# Patient Record
Sex: FEMALE | Race: WHITE | NOT HISPANIC OR LATINO
[De-identification: names, ages, dates, MRNs, and addresses within clinical notes are randomized per-mention and may not be internally consistent; named-entity substitution may affect disease eponyms.]

---

## 2017-02-07 ENCOUNTER — RX ONLY (OUTPATIENT)
Age: 32
Setting detail: RX ONLY
End: 2017-02-07

## 2017-02-07 PROBLEM — L70.0 ACNE VULGARIS: Status: ACTIVE | Noted: 2017-02-07

## 2017-12-01 ENCOUNTER — OFFICE VISIT (OUTPATIENT)
Dept: MEDICAL GROUP | Facility: MEDICAL CENTER | Age: 32
End: 2017-12-01
Payer: COMMERCIAL

## 2017-12-01 VITALS
TEMPERATURE: 99.3 F | OXYGEN SATURATION: 98 % | HEART RATE: 66 BPM | DIASTOLIC BLOOD PRESSURE: 54 MMHG | RESPIRATION RATE: 16 BRPM | SYSTOLIC BLOOD PRESSURE: 100 MMHG | HEIGHT: 69 IN | WEIGHT: 143 LBS | BODY MASS INDEX: 21.18 KG/M2

## 2017-12-01 DIAGNOSIS — R30.0 DYSURIA: ICD-10-CM

## 2017-12-01 LAB
APPEARANCE UR: CLEAR
BILIRUB UR STRIP-MCNC: NEGATIVE MG/DL
COLOR UR AUTO: YELLOW
GLUCOSE UR STRIP.AUTO-MCNC: NEGATIVE MG/DL
KETONES UR STRIP.AUTO-MCNC: NEGATIVE MG/DL
LEUKOCYTE ESTERASE UR QL STRIP.AUTO: NEGATIVE
NITRITE UR QL STRIP.AUTO: NEGATIVE
PH UR STRIP.AUTO: 5.5 [PH] (ref 5–8)
PROT UR QL STRIP: NEGATIVE MG/DL
RBC UR QL AUTO: NORMAL
SP GR UR STRIP.AUTO: 1
UROBILINOGEN UR STRIP-MCNC: NEGATIVE MG/DL

## 2017-12-01 PROCEDURE — 81002 URINALYSIS NONAUTO W/O SCOPE: CPT | Performed by: NURSE PRACTITIONER

## 2017-12-01 PROCEDURE — 99214 OFFICE O/P EST MOD 30 MIN: CPT | Performed by: NURSE PRACTITIONER

## 2017-12-01 RX ORDER — NITROFURANTOIN 25; 75 MG/1; MG/1
100 CAPSULE ORAL 2 TIMES DAILY
Qty: 14 CAP | Refills: 0 | Status: SHIPPED | OUTPATIENT
Start: 2017-12-01 | End: 2017-12-08

## 2017-12-01 ASSESSMENT — ENCOUNTER SYMPTOMS
FLANK PAIN: 0
FEVER: 0

## 2017-12-01 ASSESSMENT — PATIENT HEALTH QUESTIONNAIRE - PHQ9: CLINICAL INTERPRETATION OF PHQ2 SCORE: 0

## 2017-12-01 NOTE — PROGRESS NOTES
"Subjective:      Mini Howe is a 32 y.o. female who presents with UTI (since 11/20/17 buring, frequent urination and pressure)            HPI Mini Howe Is a patient of Dr. Reina here same day visit for dysuria.    Patient reports her symptoms started about 1-1/2 weeks ago. She states she has had urinary frequency, urgency and dysuria. She has been treating with over-the-counter products which have not helped. She denies hematuria, flank pain, fever, chills, nausea or vomiting. Nothing makes her feel better. She states her last UTI was about 3 months ago when she was in [place] and she was treated at an urgent care with antibiotics successfully. She has not been to the clinics in about 2 years stating she is normally healthy. She continues to work as a  and is getting  in [place] within the next 2 months.        Social History   Substance Use Topics   • Smoking status: Never Smoker   • Smokeless tobacco: Never Used   • Alcohol use Yes      Comment: occasionally     Current Outpatient Prescriptions   Medication Sig Dispense Refill   • AZO-CRANBERRY PO Take  by mouth.     • nitrofurantoin monohydr macro (MACROBID) 100 MG Cap Take 1 Cap by mouth 2 times a day for 7 days. 14 Cap 0     No current facility-administered medications for this visit.      Family History   Problem Relation Age of Onset   • Cancer Father 64     brain cancer   History reviewed. No pertinent past medical history.    Review of Systems   Constitutional: Negative for fever.   Genitourinary: Positive for dysuria, frequency and urgency. Negative for flank pain and hematuria.   All other systems reviewed and are negative.         Objective:     /54   Pulse 66   Temp 37.4 °C (99.3 °F)   Resp 16   Ht 1.753 m (5' 9\")   Wt 64.9 kg (143 lb)   SpO2 98%   BMI 21.12 kg/m²       Physical Exam   Constitutional: She is oriented to person, place, and time. She appears well-developed and well-nourished. No " distress.   HENT:   Head: Normocephalic and atraumatic.   Right Ear: External ear normal.   Left Ear: External ear normal.   Nose: Nose normal.   Eyes: Right eye exhibits no discharge. Left eye exhibits no discharge.   Neck: Normal range of motion. Neck supple. No thyromegaly present.   Cardiovascular: Normal rate, regular rhythm and normal heart sounds.  Exam reveals no gallop and no friction rub.    No murmur heard.  Pulmonary/Chest: Effort normal and breath sounds normal. She has no wheezes. She has no rales.   Musculoskeletal: She exhibits no edema or tenderness.   Neurological: She is alert and oriented to person, place, and time. She displays normal reflexes.   Skin: Skin is warm and dry. No rash noted. She is not diaphoretic.   Psychiatric: She has a normal mood and affect. Her behavior is normal. Judgment and thought content normal.   Nursing note and vitals reviewed.              Assessment/Plan:     1. Dysuria  Urine dip in the office is fairly normal except for trace blood. However, the patient is symptomatic of her UTI and culture report will not be back until after the weekend so I will start her on Macrobid. I explained that if she does not improve despite treatment and urine is normal, she may wish to see a gynecologist.  Prevention of UTI's teaching was provided including:    Wipe front to back to prevent e.coli contamination. Increase fluid intake, especially water. Do not hold urine , void when feel need. Finally, be sure to void after intercourse.  - POCT Urinalysis  - URINALYSIS,CULTURE IF INDICATED; Future  - nitrofurantoin monohydr macro (MACROBID) 100 MG Cap; Take 1 Cap by mouth 2 times a day for 7 days.  Dispense: 14 Cap; Refill: 0

## 2017-12-13 ENCOUNTER — OFFICE VISIT (OUTPATIENT)
Dept: MEDICAL GROUP | Facility: CLINIC | Age: 32
End: 2017-12-13
Payer: COMMERCIAL

## 2017-12-13 VITALS
RESPIRATION RATE: 14 BRPM | BODY MASS INDEX: 20.59 KG/M2 | DIASTOLIC BLOOD PRESSURE: 70 MMHG | HEART RATE: 71 BPM | OXYGEN SATURATION: 97 % | WEIGHT: 139 LBS | TEMPERATURE: 99.5 F | SYSTOLIC BLOOD PRESSURE: 118 MMHG | HEIGHT: 69 IN

## 2017-12-13 DIAGNOSIS — L02.91 ABSCESS: ICD-10-CM

## 2017-12-13 PROCEDURE — 10060 I&D ABSCESS SIMPLE/SINGLE: CPT | Performed by: FAMILY MEDICINE

## 2017-12-13 RX ORDER — AMOXICILLIN AND CLAVULANATE POTASSIUM 875; 125 MG/1; MG/1
1 TABLET, FILM COATED ORAL 2 TIMES DAILY
Qty: 14 TAB | Refills: 0 | Status: SHIPPED | OUTPATIENT
Start: 2017-12-13 | End: 2017-12-15 | Stop reason: SDUPTHER

## 2017-12-13 NOTE — PROGRESS NOTES
"CC: Abscess on right side of neck    HPI:   Mini is a 32-year-old white female who presents today with the following.    She noticed a bump that has grown into what appears to be an abscess over her right lower neck. It is tender. She tried using warm compresses which she says did not really help. She is getting  in 3 weeks in [place]. She is leaving the country in 2 weeks. She would like to get it drained.      There are no active problems to display for this patient.      Current Outpatient Prescriptions   Medication Sig Dispense Refill   • amoxicillin-clavulanate (AUGMENTIN) 875-125 MG Tab Take 1 Tab by mouth 2 times a day. 14 Tab 0   • AZO-CRANBERRY PO Take  by mouth.       No current facility-administered medications for this visit.          Allergies as of 12/13/2017   • (No Known Allergies)        ROS: As per HPI.    /70   Pulse 71   Temp 37.5 °C (99.5 °F)   Resp 14   Ht 1.753 m (5' 9\")   Wt 63 kg (139 lb)   LMP 11/10/2017   SpO2 97%   Breastfeeding? No   BMI 20.53 kg/m²     Physical Exam:  Gen:         Alert and oriented, No apparent distress.  Neck:        No Lymphadenopathy.  Skin:         Erythematous, movable, tender, fluctuant mass right lower neck adjacent to the right shoulder. It is approximately 2 cm in diameter.    Procedure note for I & D:  Risks explained. Consent is signed.  The lesion is anesthetized with a small amount of 1% lidocaine. The area is sterilized using 3 successive Betadine swabs. Using sterile technique, the mass is inciized with a #11 blade. This results in some bloody drainage but no pus. The wound was dressed with gauze after applying antibiotic topical ointment.  The patient is given instructions to use warm compresses twice daily and keep the wound clean.      Assessment and Plan.   32 y.o. female with the following issues.    1. Skin Abscess    -Instructions as noted above  - amoxicillin-clavulanate (AUGMENTIN) 875-125 MG Tab; Take 1 Tab by mouth " 2 times a day.  Dispense: 14 Tab; Refill: 0      Patient is very anxious about this healing in an expedited manner since she is leaving the country and getting . We will have her follow-up with Dr. Reina in 2 days for wound check.

## 2017-12-15 ENCOUNTER — OFFICE VISIT (OUTPATIENT)
Dept: MEDICAL GROUP | Facility: CLINIC | Age: 32
End: 2017-12-15
Payer: COMMERCIAL

## 2017-12-15 VITALS
DIASTOLIC BLOOD PRESSURE: 62 MMHG | OXYGEN SATURATION: 99 % | TEMPERATURE: 98.1 F | RESPIRATION RATE: 14 BRPM | BODY MASS INDEX: 20.88 KG/M2 | WEIGHT: 141 LBS | HEART RATE: 60 BPM | HEIGHT: 69 IN | SYSTOLIC BLOOD PRESSURE: 100 MMHG

## 2017-12-15 DIAGNOSIS — L02.91 ABSCESS: ICD-10-CM

## 2017-12-15 PROCEDURE — 99213 OFFICE O/P EST LOW 20 MIN: CPT | Performed by: INTERNAL MEDICINE

## 2017-12-15 RX ORDER — AMOXICILLIN AND CLAVULANATE POTASSIUM 875; 125 MG/1; MG/1
1 TABLET, FILM COATED ORAL 2 TIMES DAILY
Qty: 14 TAB | Refills: 0 | Status: SHIPPED | OUTPATIENT
Start: 2017-12-15 | End: 2017-12-15

## 2017-12-15 NOTE — PROGRESS NOTES
"CC: Mini Howe is a 32 y.o. female is suffering from   Chief Complaint   Patient presents with   • Follow-Up         SUBJECTIVE:  1. Abscess  Mini is here for follow-up has a history of an abscess of the right side of her posterior neck approximately C7 laterally. Patient is to be  in [facility]. We discussed possible need for vaccinations. Patient will be  on January 7 will be leaving the [place] in December 30.          Past social, family, history: Engaged  Social History   Substance Use Topics   • Smoking status: Never Smoker   • Smokeless tobacco: Never Used   • Alcohol use Yes      Comment: occasionally         MEDICATIONS:    Current Outpatient Prescriptions:   •  AZO-CRANBERRY PO, Take  by mouth., Disp: , Rfl:     PROBLEMS:  There are no active problems to display for this patient.      REVIEW OF SYSTEMS:  Gen.:  No Nausea, Vomiting, fever, Chills.  Heart: No chest pain.  Lungs:  No shortness of Breath.  Psychological: Glenn unusual Anxiety depression     PHYSICAL EXAM   Constitutional: Alert, cooperative, not in acute distress.  Cardiovascular:  Rate Rhythm is regular without murmurs rubs clicks.     Thorax & Lungs: Clear to auscultation, no wheezing, rhonchi, or rales  HENT: Normocephalic, Atraumatic.  Eyes: PERRLA, EOMI, Conjunctiva normal.   Neck: Trachia is midline no swelling of the thyroid.   Lymphatic: No lymphadenopathy noted.   Skin:Small sebaceous cyst right side of the neck previously lanced by Dr Trammell.   Neurologic: Alert & oriented x 3, cranial nerves II through XII are intact, Normal motor function, Normal sensory function, No focal deficits noted.   Psychiatric: Affect normal, Judgment normal, Mood normal.     VITAL SIGNS:/62   Pulse 60   Temp 36.7 °C (98.1 °F)   Resp 14   Ht 1.753 m (5' 9\")   Wt 64 kg (141 lb)   LMP 12/14/2017   SpO2 99%   Breastfeeding? No   BMI 20.82 kg/m²     Labs: Reviewed    Assessment:                                       "               Plan:    1. Abscess  Continue antibiotic therapy no change in medical therapy. Patient was warned about Zika virus, states that her  has previously undergone a mastectomy which will need to be reversed

## 2018-04-16 ENCOUNTER — HOSPITAL ENCOUNTER (OUTPATIENT)
Facility: MEDICAL CENTER | Age: 33
End: 2018-04-16
Attending: OBSTETRICS & GYNECOLOGY
Payer: COMMERCIAL

## 2018-04-16 PROCEDURE — 88175 CYTOPATH C/V AUTO FLUID REDO: CPT

## 2018-04-16 PROCEDURE — 87624 HPV HI-RISK TYP POOLED RSLT: CPT

## 2018-04-18 LAB
CYTOLOGY REG CYTOL: NORMAL
HPV HR 12 DNA CVX QL NAA+PROBE: NEGATIVE
HPV16 DNA SPEC QL NAA+PROBE: NEGATIVE
HPV18 DNA SPEC QL NAA+PROBE: NEGATIVE
SPECIMEN SOURCE: NORMAL

## 2019-02-01 ENCOUNTER — HOSPITAL ENCOUNTER (OUTPATIENT)
Facility: MEDICAL CENTER | Age: 34
End: 2019-02-01
Attending: INTERNAL MEDICINE
Payer: COMMERCIAL

## 2019-02-01 ENCOUNTER — OFFICE VISIT (OUTPATIENT)
Dept: MEDICAL GROUP | Facility: LAB | Age: 34
End: 2019-02-01
Payer: COMMERCIAL

## 2019-02-01 VITALS
TEMPERATURE: 98.4 F | HEART RATE: 62 BPM | RESPIRATION RATE: 20 BRPM | SYSTOLIC BLOOD PRESSURE: 106 MMHG | BODY MASS INDEX: 20.41 KG/M2 | OXYGEN SATURATION: 100 % | HEIGHT: 69 IN | WEIGHT: 137.8 LBS | DIASTOLIC BLOOD PRESSURE: 60 MMHG

## 2019-02-01 DIAGNOSIS — N30.00 ACUTE CYSTITIS WITHOUT HEMATURIA: Primary | ICD-10-CM

## 2019-02-01 DIAGNOSIS — N30.00 ACUTE CYSTITIS WITHOUT HEMATURIA: ICD-10-CM

## 2019-02-01 LAB
APPEARANCE UR: NORMAL
BILIRUB UR STRIP-MCNC: NEGATIVE MG/DL
COLOR UR AUTO: NORMAL
GLUCOSE UR STRIP.AUTO-MCNC: NEGATIVE MG/DL
KETONES UR STRIP.AUTO-MCNC: NEGATIVE MG/DL
LEUKOCYTE ESTERASE UR QL STRIP.AUTO: NEGATIVE
NITRITE UR QL STRIP.AUTO: NEGATIVE
PH UR STRIP.AUTO: 7.5 [PH] (ref 5–8)
PROT UR QL STRIP: NEGATIVE MG/DL
RBC UR QL AUTO: NEGATIVE
SP GR UR STRIP.AUTO: 1.01
UROBILINOGEN UR STRIP-MCNC: 0.2 MG/DL

## 2019-02-01 PROCEDURE — 81002 URINALYSIS NONAUTO W/O SCOPE: CPT | Performed by: INTERNAL MEDICINE

## 2019-02-01 PROCEDURE — 99214 OFFICE O/P EST MOD 30 MIN: CPT | Performed by: INTERNAL MEDICINE

## 2019-02-01 PROCEDURE — 87077 CULTURE AEROBIC IDENTIFY: CPT

## 2019-02-01 PROCEDURE — 87086 URINE CULTURE/COLONY COUNT: CPT

## 2019-02-01 RX ORDER — NITROFURANTOIN 25; 75 MG/1; MG/1
100 CAPSULE ORAL 2 TIMES DAILY
Qty: 10 CAP | Refills: 0 | Status: SHIPPED | OUTPATIENT
Start: 2019-02-01 | End: 2019-02-06

## 2019-02-01 ASSESSMENT — PATIENT HEALTH QUESTIONNAIRE - PHQ9: CLINICAL INTERPRETATION OF PHQ2 SCORE: 0

## 2019-02-01 NOTE — PROGRESS NOTES
Chief Complaint   Patient presents with   • Urinary Frequency     Possible UTI, pressure for a couple of days       Subjective:     HPI:   Mini presents today with the following.    Acute cystitis without hematuria  New to discuss, uncontrolled problem.  Her symptoms started last Monday with suprapubic pain/discomfort, rated as 5/10, dull in nature, associated with increased urinary frequency, every 2 hours throughout the day yesterday, also have urinary urgency and mild burning during urination.  Feeling overall very tired and fatigue but denied any fever or chills.  She had a UTI over a year ago, that was simple/uncomplicated and treated with robotics.  She is currently sexually active and monogamous, her  has vasectomy, she is doing postcoital urination or regular basis.          Patient Active Problem List    Diagnosis Date Noted   • Acute cystitis without hematuria 02/01/2019       Current Outpatient Prescriptions   Medication Sig Dispense Refill   • nitrofurantoin monohydr macro (MACROBID) 100 MG Cap Take 1 Cap by mouth 2 times a day for 5 days. 10 Cap 0     No current facility-administered medications for this visit.        Allergies as of 02/01/2019   • (No Known Allergies)        No past medical history on file.    Past Surgical History:   Procedure Laterality Date   • FOOT SURGERY         Social History   Substance Use Topics   • Smoking status: Never Smoker   • Smokeless tobacco: Never Used   • Alcohol use Yes      Comment: occasionally       Family History   Problem Relation Age of Onset   • Cancer Father 64        brain cancer       ROS:     - Constitutional: + Fatigue. Negative for fever, chills, unexpected weight change.    - HEENT: Negative for headaches, vision changes, hearing changes, ear pain, ear discharge, sinus congestion, or sore throat.     - Respiratory: Negative for cough, sputum production, dyspnea and wheezing.    - Cardiovascular: Negative for chest pain or palpitations.       "- Gastrointestinal: Negative for heartburn, nausea, vomiting, abdominal pain, diarrhea or constipation.     - Genitourinary:Per HPI.    - Musculoskeletal: Negative for myalgias, back pain, and joint pain.     - Skin: Negative for rash, itching, cyanotic skin color change.     - Psychiatric/Behavioral: Negative for depression or suicidal/homicidal ideation.       Physical Exam:     Blood pressure 106/60, pulse 62, temperature 36.9 °C (98.4 °F), temperature source Temporal, resp. rate 20, height 1.753 m (5' 9\"), weight 62.5 kg (137 lb 12.8 oz), SpO2 100 %, not currently breastfeeding. Body mass index is 20.35 kg/m².   Gen:         Alert and oriented, No apparent distress.  Neck:        No Lymphadenopathy or Bruits.  Lungs:     Clear to auscultation bilaterally  CV:          Regular rate and rhythm. No murmurs, rubs or gallops.         Abdomen: Soft, nondistended, mild deep suprapubic tenderness, positive bowel sounds, no hepatosplenomegaly.        Ext:          No clubbing, cyanosis, edema.      Assessment and Plan:     33 y.o. female with the following issues.    1. Acute cystitis without hematuria  New, uncontrolled problem.  Classic acute cystitis symptoms but UA in office neg for LE/nitrites.   We will send her urine for culture.  We will treat her empirically with 5 days of nitrofurantoin since her symptoms are very classic.      - POCT Urinalysis  - nitrofurantoin monohydr macro (MACROBID) 100 MG Cap; Take 1 Cap by mouth 2 times a day for 5 days.  Dispense: 10 Cap; Refill: 0  - URINE CULTURE(NEW); Future    Follow Up:      No Follow-up on file.      Please note that this dictation was created using voice recognition software. I have made every reasonable attempt to correct obvious errors, but I expect that there are errors of grammar and possibly content that I did not discover before finalizing the note.    Signed by: Mariama Hawkins M.D.  "

## 2019-02-01 NOTE — ASSESSMENT & PLAN NOTE
New to discuss, uncontrolled problem.  Her symptoms started last Monday with suprapubic pain/discomfort, rated as 5/10, dull in nature, associated with increased urinary frequency, every 2 hours throughout the day yesterday, also have urinary urgency and mild burning during urination.  Feeling overall very tired and fatigue but denied any fever or chills.  She had a UTI over a year ago, that was simple/uncomplicated and treated with robotics.  She is currently sexually active and monogamous, her  has vasectomy, she is doing postcoital urination or regular basis.

## 2019-02-04 LAB
BACTERIA UR CULT: ABNORMAL
BACTERIA UR CULT: ABNORMAL
SIGNIFICANT IND 70042: ABNORMAL
SITE SITE: ABNORMAL
SOURCE SOURCE: ABNORMAL

## 2019-02-19 ENCOUNTER — APPOINTMENT (RX ONLY)
Dept: URBAN - METROPOLITAN AREA CLINIC 4 | Facility: CLINIC | Age: 34
Setting detail: DERMATOLOGY
End: 2019-02-19

## 2019-02-19 DIAGNOSIS — L71.8 OTHER ROSACEA: ICD-10-CM

## 2019-02-19 DIAGNOSIS — L82.1 OTHER SEBORRHEIC KERATOSIS: ICD-10-CM

## 2019-02-19 DIAGNOSIS — L21.8 OTHER SEBORRHEIC DERMATITIS: ICD-10-CM

## 2019-02-19 DIAGNOSIS — L81.4 OTHER MELANIN HYPERPIGMENTATION: ICD-10-CM

## 2019-02-19 DIAGNOSIS — D22 MELANOCYTIC NEVI: ICD-10-CM

## 2019-02-19 DIAGNOSIS — D18.0 HEMANGIOMA: ICD-10-CM

## 2019-02-19 DIAGNOSIS — L57.8 OTHER SKIN CHANGES DUE TO CHRONIC EXPOSURE TO NONIONIZING RADIATION: ICD-10-CM

## 2019-02-19 PROBLEM — D22.61 MELANOCYTIC NEVI OF RIGHT UPPER LIMB, INCLUDING SHOULDER: Status: ACTIVE | Noted: 2019-02-19

## 2019-02-19 PROBLEM — D22.5 MELANOCYTIC NEVI OF TRUNK: Status: ACTIVE | Noted: 2019-02-19

## 2019-02-19 PROBLEM — D18.01 HEMANGIOMA OF SKIN AND SUBCUTANEOUS TISSUE: Status: ACTIVE | Noted: 2019-02-19

## 2019-02-19 PROBLEM — D22.71 MELANOCYTIC NEVI OF RIGHT LOWER LIMB, INCLUDING HIP: Status: ACTIVE | Noted: 2019-02-19

## 2019-02-19 PROBLEM — D22.62 MELANOCYTIC NEVI OF LEFT UPPER LIMB, INCLUDING SHOULDER: Status: ACTIVE | Noted: 2019-02-19

## 2019-02-19 PROBLEM — D22.72 MELANOCYTIC NEVI OF LEFT LOWER LIMB, INCLUDING HIP: Status: ACTIVE | Noted: 2019-02-19

## 2019-02-19 PROCEDURE — ? SUNSCREEN RECOMMENDATIONS

## 2019-02-19 PROCEDURE — 99395 PREV VISIT EST AGE 18-39: CPT

## 2019-02-19 PROCEDURE — ? COUNSELING

## 2019-02-19 PROCEDURE — ? PRESCRIPTION SAMPLES PROVIDED

## 2019-02-19 ASSESSMENT — LOCATION DETAILED DESCRIPTION DERM
LOCATION DETAILED: LEFT SUPERIOR MEDIAL UPPER BACK
LOCATION DETAILED: RIGHT ANTERIOR PROXIMAL THIGH
LOCATION DETAILED: SUPERIOR THORACIC SPINE
LOCATION DETAILED: LEFT PROXIMAL DORSAL FOREARM
LOCATION DETAILED: LEFT ULNAR DORSAL HAND
LOCATION DETAILED: LEFT PROXIMAL POSTERIOR UPPER ARM
LOCATION DETAILED: RIGHT CENTRAL MALAR CHEEK
LOCATION DETAILED: RIGHT DISTAL POSTERIOR UPPER ARM
LOCATION DETAILED: RIGHT RADIAL DORSAL HAND
LOCATION DETAILED: PERIUMBILICAL SKIN
LOCATION DETAILED: LEFT CENTRAL MALAR CHEEK
LOCATION DETAILED: RIGHT RIB CAGE
LOCATION DETAILED: LEFT INFERIOR ANTERIOR NECK
LOCATION DETAILED: RIGHT SUPERIOR MEDIAL MIDBACK
LOCATION DETAILED: LEFT ANTERIOR PROXIMAL THIGH
LOCATION DETAILED: RIGHT PROXIMAL DORSAL FOREARM

## 2019-02-19 ASSESSMENT — LOCATION SIMPLE DESCRIPTION DERM
LOCATION SIMPLE: LEFT ANTERIOR NECK
LOCATION SIMPLE: RIGHT THIGH
LOCATION SIMPLE: RIGHT CHEEK
LOCATION SIMPLE: LEFT HAND
LOCATION SIMPLE: ABDOMEN
LOCATION SIMPLE: LEFT FOREARM
LOCATION SIMPLE: LEFT CHEEK
LOCATION SIMPLE: LEFT UPPER BACK
LOCATION SIMPLE: RIGHT LOWER BACK
LOCATION SIMPLE: LEFT POSTERIOR UPPER ARM
LOCATION SIMPLE: LEFT THIGH
LOCATION SIMPLE: UPPER BACK
LOCATION SIMPLE: RIGHT FOREARM
LOCATION SIMPLE: RIGHT HAND
LOCATION SIMPLE: RIGHT POSTERIOR UPPER ARM

## 2019-02-19 ASSESSMENT — LOCATION ZONE DERM
LOCATION ZONE: FACE
LOCATION ZONE: NECK
LOCATION ZONE: LEG
LOCATION ZONE: ARM
LOCATION ZONE: HAND
LOCATION ZONE: TRUNK

## 2019-02-19 NOTE — HPI: FULL BODY SKIN EXAMINATION
How Severe Are Your Spot(S)?: moderate
What Type Of Note Output Would You Prefer (Optional)?: Standard Output
What Is The Reason For Today's Visit?: Full Body Skin Examination
What Is The Reason For Today's Visit? (Being Monitored For X): the risk of recurrence of previously treated lesion(s)
Additional History: Skin tag in right axilla for months. FBE.

## 2019-03-05 ENCOUNTER — APPOINTMENT (RX ONLY)
Dept: URBAN - METROPOLITAN AREA CLINIC 20 | Facility: CLINIC | Age: 34
Setting detail: DERMATOLOGY
End: 2019-03-05

## 2019-03-05 DIAGNOSIS — Z41.9 ENCOUNTER FOR PROCEDURE FOR PURPOSES OTHER THAN REMEDYING HEALTH STATE, UNSPECIFIED: ICD-10-CM

## 2019-03-05 PROCEDURE — ? ADDITIONAL NOTES

## 2019-03-05 PROCEDURE — ? MEDICAL CONSULTATION: BBL

## 2019-03-05 NOTE — PROCEDURE: ADDITIONAL NOTES
Additional Notes: Concern- redness & some brown spots \\nPlan- HQ4% 2 weeks prior to BBL series. Explained need for multiple treatments as well as maintenance. Quote & pre/post care instructions given.
Detail Level: Simple

## 2019-03-11 ENCOUNTER — OFFICE VISIT (OUTPATIENT)
Dept: MEDICAL GROUP | Facility: LAB | Age: 34
End: 2019-03-11
Payer: COMMERCIAL

## 2019-03-11 ENCOUNTER — HOSPITAL ENCOUNTER (OUTPATIENT)
Facility: MEDICAL CENTER | Age: 34
End: 2019-03-11
Attending: FAMILY MEDICINE
Payer: COMMERCIAL

## 2019-03-11 VITALS
DIASTOLIC BLOOD PRESSURE: 60 MMHG | SYSTOLIC BLOOD PRESSURE: 104 MMHG | HEART RATE: 69 BPM | HEIGHT: 69 IN | TEMPERATURE: 98.1 F | WEIGHT: 137.6 LBS | OXYGEN SATURATION: 98 % | BODY MASS INDEX: 20.38 KG/M2

## 2019-03-11 DIAGNOSIS — R30.0 DYSURIA: ICD-10-CM

## 2019-03-11 PROCEDURE — 81001 URINALYSIS AUTO W/SCOPE: CPT

## 2019-03-11 PROCEDURE — 87480 CANDIDA DNA DIR PROBE: CPT

## 2019-03-11 PROCEDURE — 87510 GARDNER VAG DNA DIR PROBE: CPT

## 2019-03-11 PROCEDURE — 87491 CHLMYD TRACH DNA AMP PROBE: CPT

## 2019-03-11 PROCEDURE — 87591 N.GONORRHOEAE DNA AMP PROB: CPT

## 2019-03-11 PROCEDURE — 99214 OFFICE O/P EST MOD 30 MIN: CPT | Performed by: FAMILY MEDICINE

## 2019-03-11 PROCEDURE — 87660 TRICHOMONAS VAGIN DIR PROBE: CPT

## 2019-03-11 RX ORDER — AMOXICILLIN 500 MG/1
500 CAPSULE ORAL 3 TIMES DAILY
Qty: 30 CAP | Refills: 0 | Status: SHIPPED | OUTPATIENT
Start: 2019-03-11 | End: 2019-09-23

## 2019-03-11 NOTE — PROGRESS NOTES
"Subjective:     CC: Dysuria     HPI:   Mini presents today with a 2-day history of increased urinary frequency and pelvic pain.  She states that it was concurrent with her menstrual cycle.  She states that she has had a UTI in the past however it did grow normal brando with an overgrowth of the group B strep.  She denies any fevers, chills, night sweats.  She states she has discharge however she is unsure if this is abnormal or normal physiologic discharge.  She has never had a bacterial or yeast infection.  She tried Macrobid in the past and that did not help her symptoms but then amoxicillin did.  She is not currently sexually active.    Social History   Substance Use Topics   • Smoking status: Never Smoker   • Smokeless tobacco: Never Used   • Alcohol use Yes      Comment: occasionally       Current Outpatient Prescriptions Ordered in [facility]   Medication Sig Dispense Refill   • amoxicillin (AMOXIL) 500 MG Cap Take 1 Cap by mouth 3 times a day. 30 Cap 0     No current Epic-ordered facility-administered medications on file.        Allergies:  Patient has no known allergies.    Health Maintenance: Postponed    ROS:  Gen: no fevers/chill, no changes in weight  Eyes: no changes in vision  ENT: no sore throat, no hearing loss, no bloody nose  Pulm: no sob, no cough  CV: no chest pain, no palpitations  GI: no nausea/vomiting, no diarrhea  : +dysuria, +frequency  MSk: no myalgias  Skin: no rash  Neuro: no headaches, no numbness/tingling  Heme/Lymph: no easy bruising      Objective:       Exam:  /60 (BP Location: Left arm, Patient Position: Sitting)   Pulse 69   Temp 36.7 °C (98.1 °F) (Temporal)   Ht 1.753 m (5' 9\")   Wt 62.4 kg (137 lb 9.6 oz)   SpO2 98%   BMI 20.32 kg/m²  Body mass index is 20.32 kg/m².    Gen: Alert and oriented, No apparent distress.  Neck: Neck is supple without lymphadenopathy.  Lungs: Normal effort, CTA bilaterally, no wheezes, rhonchi, or rales  CV: Regular rate and rhythm. No " murmurs, rubs, or gallops.               Ext: No clubbing, cyanosis, edema.      Assessment & Plan:     33 y.o. female with the following -     1. Dysuria  Patient's point of care urinalysis was negative however positive for blood patient was on her menstrual cycle.  Will check for yeast, trichomonas, and BV to rule out vaginal etiology.  Suggest she drinks plenty of fluids and will continue to monitor for symptoms.  She will  antibiotics if this worsens.  We will also check for gonorrhea and chlamydia.  ER and follow-up precautions discussed.  - VAGINAL PATHOGENS DNA PANEL; Future  - POCT Urinalysis  - Chlamydia/GC PCR Urine Or Swab; Future  - URINALYSIS,CULTURE IF INDICATED; Future        Please note that this dictation was created using voice recognition software. I have made every reasonable attempt to correct obvious errors, but I expect that there are errors of grammar and possibly content that I did not discover before finalizing the note.

## 2019-03-12 LAB
APPEARANCE UR: CLEAR
BACTERIA #/AREA URNS HPF: ABNORMAL /HPF
C TRACH DNA SPEC QL NAA+PROBE: NEGATIVE
CANDIDA DNA VAG QL PROBE+SIG AMP: NEGATIVE
COLOR UR: YELLOW
EPI CELLS #/AREA URNS HPF: ABNORMAL /HPF
G VAGINALIS DNA VAG QL PROBE+SIG AMP: NEGATIVE
GLUCOSE UR STRIP.AUTO-MCNC: NEGATIVE MG/DL
KETONES UR STRIP.AUTO-MCNC: ABNORMAL MG/DL
LEUKOCYTE ESTERASE UR QL STRIP.AUTO: NEGATIVE
MICRO URNS: ABNORMAL
N GONORRHOEA DNA SPEC QL NAA+PROBE: NEGATIVE
NITRITE UR QL STRIP.AUTO: NEGATIVE
PH UR STRIP.AUTO: 7 [PH]
PROT UR QL STRIP: NEGATIVE MG/DL
RBC # URNS HPF: ABNORMAL /HPF
RBC UR QL AUTO: ABNORMAL
SP GR UR STRIP.AUTO: 1.01
SPECIMEN SOURCE: NORMAL
T VAGINALIS DNA VAG QL PROBE+SIG AMP: NEGATIVE
WBC #/AREA URNS HPF: ABNORMAL /HPF

## 2019-03-22 ENCOUNTER — APPOINTMENT (RX ONLY)
Dept: URBAN - METROPOLITAN AREA CLINIC 20 | Facility: CLINIC | Age: 34
Setting detail: DERMATOLOGY
End: 2019-03-22

## 2019-03-22 DIAGNOSIS — Z41.9 ENCOUNTER FOR PROCEDURE FOR PURPOSES OTHER THAN REMEDYING HEALTH STATE, UNSPECIFIED: ICD-10-CM

## 2019-03-22 PROCEDURE — ? SCITON BBL

## 2019-03-22 ASSESSMENT — LOCATION ZONE DERM: LOCATION ZONE: FACE

## 2019-03-22 ASSESSMENT — LOCATION DETAILED DESCRIPTION DERM: LOCATION DETAILED: LEFT MEDIAL FOREHEAD

## 2019-03-22 ASSESSMENT — LOCATION SIMPLE DESCRIPTION DERM: LOCATION SIMPLE: LEFT FOREHEAD

## 2019-03-22 NOTE — PROCEDURE: SCITON BBL
Cooling ?: Yes
Indication Override (Will Not Show Above If Text Entered): vascular
Fluence (J/Cm2): 25
Passes: 1
Pulse Duration: 20
Anesthesia Volume In Cc: 0
Pulse Duration Units: milliseconds
Consent: Written consent obtained, risks reviewed including but not limited to crusting, scabbing, blistering, scarring, darker or lighter pigmentary change, bruising, and/or incomplete response.
Preprocedure Text: The treatment areas were thoroughly cleaned. Clear ultrasound gel was applied to the treatment area. The area was treated with no immediate stacking of pulses.
Post-Care Instructions: I reviewed with the patient in detail post-care instructions. Patient should stay away from the sun and wear sun protection until treated areas are fully healed.
Post Procedure Text: The patient tolerated the procedure well. Post care was reviewed with the patient.
Fluence (J/Cm2): 12
Spot Size: Finesse Adapter Size: 15 x 15 mm square
Fluence (J/Cm2): 11
Fluence (J/Cm2): 10
Cooling (In C): 15
Detail Level: Zone
Spot Size: Finesse Adapter Size: 15 x 45 mm (No Finesse Adapter)
Price (Use Numbers Only, No Special Characters Or $): 350.00

## 2019-05-02 ENCOUNTER — APPOINTMENT (RX ONLY)
Dept: URBAN - METROPOLITAN AREA CLINIC 20 | Facility: CLINIC | Age: 34
Setting detail: DERMATOLOGY
End: 2019-05-02

## 2019-05-02 DIAGNOSIS — Z41.9 ENCOUNTER FOR PROCEDURE FOR PURPOSES OTHER THAN REMEDYING HEALTH STATE, UNSPECIFIED: ICD-10-CM

## 2019-05-02 PROCEDURE — ? SCITON BBL

## 2019-05-02 ASSESSMENT — LOCATION DETAILED DESCRIPTION DERM: LOCATION DETAILED: LEFT MEDIAL FOREHEAD

## 2019-05-02 ASSESSMENT — LOCATION ZONE DERM: LOCATION ZONE: FACE

## 2019-05-02 ASSESSMENT — LOCATION SIMPLE DESCRIPTION DERM: LOCATION SIMPLE: LEFT FOREHEAD

## 2019-05-02 NOTE — PROCEDURE: SCITON BBL
Pulse Duration Units: milliseconds
Post Procedure Text: The patient tolerated the procedure well. Post care was reviewed with the patient.
Repetition Rate (Hz): 1
Fluence (J/Cm2): 15
Spot Size: Finesse Adapter Size: 15 x 15 mm square
Cooling ?: Yes
Detail Level: Zone
Post-Care Instructions: I reviewed with the patient in detail post-care instructions. Patient should stay away from the sun and wear sun protection until treated areas are fully healed.
Treatment Number: 2
Cooling (In C): 20
Spot Size: Finesse Adapter Size: 15 x 45 mm (No Finesse Adapter)
Fluence (J/Cm2): 14
Repetition Rate (Hz): 10
Consent: Written consent obtained, risks reviewed including but not limited to crusting, scabbing, blistering, scarring, darker or lighter pigmentary change, bruising, and/or incomplete response.
Indication Override (Will Not Show Above If Text Entered): vascular
Fluence (J/Cm2): 25
Fluence (J/Cm2): 12
Price (Use Numbers Only, No Special Characters Or $): 350.00
Anesthesia Volume In Cc: 0
Preprocedure Text: The treatment areas were thoroughly cleaned. Clear ultrasound gel was applied to the treatment area. The area was treated with no immediate stacking of pulses.

## 2019-06-07 ENCOUNTER — APPOINTMENT (RX ONLY)
Dept: URBAN - METROPOLITAN AREA CLINIC 20 | Facility: CLINIC | Age: 34
Setting detail: DERMATOLOGY
End: 2019-06-07

## 2019-06-07 DIAGNOSIS — Z41.9 ENCOUNTER FOR PROCEDURE FOR PURPOSES OTHER THAN REMEDYING HEALTH STATE, UNSPECIFIED: ICD-10-CM

## 2019-06-07 PROCEDURE — ? SCITON BBL

## 2019-06-07 ASSESSMENT — LOCATION ZONE DERM: LOCATION ZONE: FACE

## 2019-06-07 ASSESSMENT — LOCATION DETAILED DESCRIPTION DERM: LOCATION DETAILED: LEFT MEDIAL FOREHEAD

## 2019-06-07 ASSESSMENT — LOCATION SIMPLE DESCRIPTION DERM: LOCATION SIMPLE: LEFT FOREHEAD

## 2019-06-07 NOTE — PROCEDURE: SCITON BBL
Pulse Duration Units: milliseconds
Passes: 1
Location Override (Will Not Show Above If Text Entered): left thigh
Indication Override (Will Not Show Above If Text Entered): vascular
Anesthesia Volume In Cc: 0
Fluence (J/Cm2): 20
Fluence (J/Cm2): 16
Spot Size: Finesse Adapter Size: 7 mm round
Spot Size: Finesse Adapter Size: 15 x 15 mm square
Cooling ?: Yes
Topical Anesthesia?: 23% lidocaine, 7% tetracaine
Preprocedure Text: The treatment areas were thoroughly cleaned. Clear ultrasound gel was applied to the treatment area. The area was treated with no immediate stacking of pulses.
Consent: Written consent obtained, risks reviewed including but not limited to crusting, scabbing, blistering, scarring, darker or lighter pigmentary change, bruising, and/or incomplete response.
Pulse Duration: 18
Price (Use Numbers Only, No Special Characters Or $): 192.31
Detail Level: Zone
Post-Care Instructions: I reviewed with the patient in detail post-care instructions. Patient should stay away from the sun and wear sun protection until treated areas are fully healed.
Cooling (In C): 15
Length Of Topical Anesthesia Application (Optional): 30 minutes
Fluence (J/Cm2): 14
Fluence (J/Cm2): 25
Treatment Number: 3
Post Procedure Text: The patient tolerated the procedure well. Post care was reviewed with the patient.

## 2019-07-19 ENCOUNTER — APPOINTMENT (RX ONLY)
Dept: URBAN - METROPOLITAN AREA CLINIC 20 | Facility: CLINIC | Age: 34
Setting detail: DERMATOLOGY
End: 2019-07-19

## 2019-07-19 DIAGNOSIS — Z41.9 ENCOUNTER FOR PROCEDURE FOR PURPOSES OTHER THAN REMEDYING HEALTH STATE, UNSPECIFIED: ICD-10-CM

## 2019-07-19 PROCEDURE — ? SCITON BBL

## 2019-07-19 PROCEDURE — ? ADDITIONAL NOTES

## 2019-07-19 ASSESSMENT — LOCATION SIMPLE DESCRIPTION DERM: LOCATION SIMPLE: LEFT FOREHEAD

## 2019-07-19 ASSESSMENT — LOCATION ZONE DERM: LOCATION ZONE: FACE

## 2019-07-19 ASSESSMENT — LOCATION DETAILED DESCRIPTION DERM: LOCATION DETAILED: LEFT MEDIAL FOREHEAD

## 2019-07-19 NOTE — PROCEDURE: SCITON BBL
Passes: 1
Spot Size: Finesse Adapter Size: 7 mm round
Cooling (In C): 20
Spot Size: Finesse Adapter Size: 15 x 15 mm square
Pulse Duration Units: milliseconds
Preprocedure Text: The treatment areas were thoroughly cleaned. Clear ultrasound gel was applied to the treatment area. The area was treated with no immediate stacking of pulses.
Anesthesia Volume In Cc: 0
Location Override (Will Not Show Above If Text Entered): left thigh
Cooling ?: Yes
Pulse Duration: 18
Consent: Written consent obtained, risks reviewed including but not limited to crusting, scabbing, blistering, scarring, darker or lighter pigmentary change, bruising, and/or incomplete response.
Cooling (In C): 15
Indication Override (Will Not Show Above If Text Entered): vascular
Price (Use Numbers Only, No Special Characters Or $): 350.00
Detail Level: Zone
Post Procedure Text: The patient tolerated the procedure well. Post care was reviewed with the patient.
Fluence (J/Cm2): 16
Location Override (Will Not Show Above If Text Entered): spot treat nose
Fluence (J/Cm2): 25
Post-Care Instructions: I reviewed with the patient in detail post-care instructions. Patient should stay away from the sun and wear sun protection until treated areas are fully healed.
Treatment Number: 4

## 2019-09-23 ENCOUNTER — APPOINTMENT (OUTPATIENT)
Dept: RADIOLOGY | Facility: IMAGING CENTER | Age: 34
End: 2019-09-23
Attending: FAMILY MEDICINE
Payer: COMMERCIAL

## 2019-09-23 ENCOUNTER — OFFICE VISIT (OUTPATIENT)
Dept: URGENT CARE | Facility: CLINIC | Age: 34
End: 2019-09-23
Payer: COMMERCIAL

## 2019-09-23 VITALS
TEMPERATURE: 99.7 F | DIASTOLIC BLOOD PRESSURE: 64 MMHG | HEART RATE: 74 BPM | RESPIRATION RATE: 16 BRPM | OXYGEN SATURATION: 99 % | BODY MASS INDEX: 20.97 KG/M2 | WEIGHT: 142 LBS | SYSTOLIC BLOOD PRESSURE: 100 MMHG

## 2019-09-23 DIAGNOSIS — S32.009A CLOSED FRACTURE OF TRANSVERSE PROCESS OF LUMBAR VERTEBRA, INITIAL ENCOUNTER (HCC): ICD-10-CM

## 2019-09-23 DIAGNOSIS — M54.9 DORSALGIA: ICD-10-CM

## 2019-09-23 PROCEDURE — 72100 X-RAY EXAM L-S SPINE 2/3 VWS: CPT | Mod: TC | Performed by: FAMILY MEDICINE

## 2019-09-23 PROCEDURE — 99214 OFFICE O/P EST MOD 30 MIN: CPT | Performed by: FAMILY MEDICINE

## 2019-09-23 ASSESSMENT — ENCOUNTER SYMPTOMS
BACK PAIN: 1
MYALGIAS: 1

## 2019-09-23 NOTE — PROGRESS NOTES
Subjective:      Mini Howe is a 33 y.o. female who presents with Back Pain (x6 wks )      - This is a pleasant and non toxic appearing 33 y.o. female with c/o axial lower back pain x ~6wks. Started whilst playing tennis and twisted wrong. Radiates out to left and Rt hip at times and down to Rt knee at times. Pain only triggered w/ certain movements/positions, no pain at rest     No fever, no trauma, no bowel/bladder dysfunction or lower limb weakness/heaviness.             ALLERGIES:  Patient has no known allergies.     PMH:  History reviewed. No pertinent past medical history.     PSH:  Past Surgical History:   Procedure Laterality Date   • FOOT SURGERY         MEDS:  No current outpatient medications on file.    ** I have documented what I find to be significant in regards to past medical, social, family and surgical history  in my HPI or under PMH/PSH/FH review section, otherwise it is contributory **           HPI    Review of Systems   Musculoskeletal: Positive for back pain and myalgias.   All other systems reviewed and are negative.         Objective:     /64   Pulse 74   Temp 37.6 °C (99.7 °F) (Temporal)   Resp 16   Wt 64.4 kg (142 lb)   LMP 08/26/2019 (Approximate)   SpO2 99%   BMI 20.97 kg/m²      Physical Exam   Constitutional: She appears well-developed and well-nourished. No distress.   HENT:   Head: Normocephalic and atraumatic.   Eyes: Conjunctivae are normal.   Cardiovascular: Normal heart sounds.   No murmur heard.  Pulmonary/Chest: Effort normal and breath sounds normal. No respiratory distress.   Musculoskeletal: She exhibits tenderness.        Back:    Neurological: She is alert. She exhibits normal muscle tone.   Skin: Skin is warm and dry. She is not diaphoretic.   Psychiatric: She has a normal mood and affect. Judgment normal.   Nursing note and vitals reviewed.  Bilateral lower extremity strength and sensory intact.  Negative straight leg raise.   Some pain to palp/rom              Assessment/Plan:         1. Dorsalgia  DX-LUMBAR SPINE-2 OR 3 VIEWS    REFERRAL TO PHYSIATRY (PMR)    CANCELED: REFERRAL TO PHYSIATRY (PMR)   2. Closed fracture of transverse process of lumbar vertebra, initial encounter (HCC)  REFERRAL TO PHYSIATRY (PMR)       - rest/Motrin      Dx & d/c instructions discussed w/ patient and/or family members.     Follow up with PCP (or here if PCP unavailable) in 2-3 days if symptoms not improving, ER if feeling/getting worse.    Any realistic and/or common medication side effects that may have been given today(i.e. Rash, GI upset/constipation, sedation, elevation of BP or blood sugars) reviewed.     Patient left in stable condition

## 2019-10-17 ENCOUNTER — OFFICE VISIT (OUTPATIENT)
Dept: PHYSICAL MEDICINE AND REHAB | Facility: MEDICAL CENTER | Age: 34
End: 2019-10-17
Payer: COMMERCIAL

## 2019-10-17 VITALS
DIASTOLIC BLOOD PRESSURE: 64 MMHG | HEART RATE: 74 BPM | BODY MASS INDEX: 21.98 KG/M2 | TEMPERATURE: 97.9 F | HEIGHT: 69 IN | OXYGEN SATURATION: 96 % | SYSTOLIC BLOOD PRESSURE: 110 MMHG | WEIGHT: 148.37 LBS

## 2019-10-17 DIAGNOSIS — M54.42 ACUTE LEFT-SIDED LOW BACK PAIN WITH LEFT-SIDED SCIATICA: ICD-10-CM

## 2019-10-17 DIAGNOSIS — S32.009A CLOSED FRACTURE OF TRANSVERSE PROCESS OF LUMBAR VERTEBRA, INITIAL ENCOUNTER (HCC): ICD-10-CM

## 2019-10-17 PROCEDURE — 99205 OFFICE O/P NEW HI 60 MIN: CPT | Performed by: PHYSICAL MEDICINE & REHABILITATION

## 2019-10-17 RX ORDER — MELOXICAM 15 MG/1
15 TABLET ORAL DAILY
Qty: 30 TAB | Refills: 0 | Status: SHIPPED | OUTPATIENT
Start: 2019-10-17 | End: 2019-10-31

## 2019-10-17 ASSESSMENT — PATIENT HEALTH QUESTIONNAIRE - PHQ9
5. POOR APPETITE OR OVEREATING: 0
7. TROUBLE CONCENTRATING ON THINGS, SUCH AS READING THE NEWSPAPER OR WATCHING TELEVISION: 1
3. TROUBLE FALLING OR STAYING ASLEEP OR SLEEPING TOO MUCH: 0
SUM OF ALL RESPONSES TO PHQ QUESTIONS 1-9: 2
CLINICAL INTERPRETATION OF PHQ2 SCORE: 0
SUM OF ALL RESPONSES TO PHQ9 QUESTIONS 1 AND 2: 0
1. LITTLE INTEREST OR PLEASURE IN DOING THINGS: 0
6. FEELING BAD ABOUT YOURSELF - OR THAT YOU ARE A FAILURE OR HAVE LET YOURSELF OR YOUR FAMILY DOWN: 0
8. MOVING OR SPEAKING SO SLOWLY THAT OTHER PEOPLE COULD HAVE NOTICED. OR THE OPPOSITE, BEING SO FIGETY OR RESTLESS THAT YOU HAVE BEEN MOVING AROUND A LOT MORE THAN USUAL: 0
4. FEELING TIRED OR HAVING LITTLE ENERGY: 1
9. THOUGHTS THAT YOU WOULD BE BETTER OFF DEAD, OR OF HURTING YOURSELF: 0
2. FEELING DOWN, DEPRESSED, IRRITABLE, OR HOPELESS: 0

## 2019-10-17 ASSESSMENT — PAIN SCALES - GENERAL: PAINLEVEL: 9=SEVERE PAIN

## 2019-10-17 NOTE — PROGRESS NOTES
New patient note    Physiatry (physical medicine and  Rehabilitation), interventional spine and sports medicine    Date of Service: 10/17/2019    Chief complaint:   Chief Complaint   Patient presents with   • New Patient     back pain        HISTORY    HPI: Mini Howe 33 y.o. female who presents today with Diagnoses of Closed fracture of transverse process of lumbar vertebra, initial encounter ([facility]) left T1 and Acute left-sided low back pain with left-sided sciatica were pertinent to this visit.    HPI  Left hip pain while playing tennis with left lateral hip pain and sharp, stabbing pain in the low back, worse with lumbar flexion, 8/10 pain, not improving over the past two months. She has been using a TLSO brace which has improved the pain. The patient is highly active and is a runner.          Medical records review:  I reviewed the note from the referring provider Mohan Tanner M.D. including the note dated 9/23/2019, patient referred to physiatry, x-ray lumbar spine ordered, prescribed rest and Motrin.      ROS:   Red Flags ROS:   Fever, Chills, Sweats: Denies  Involuntary Weight Loss: Denies  Bladder Incontinence: Denies  Bowel Incontinence: denies  Saddle Anesthesia: Denies    All other systems reviewed and negative.       PMHx:   History reviewed. No pertinent past medical history.      No current outpatient medications on file prior to visit.     No current facility-administered medications on file prior to visit.         PSHx:   Past Surgical History:   Procedure Laterality Date   • FOOT SURGERY         Family history   Family History   Problem Relation Age of Onset   • Cancer Father 64        brain cancer         Medications: reviewed on epic.   Outpatient Medications Marked as Taking for the 10/17/19 encounter (Office Visit) with Markel Arellano M.D.   Medication Sig Dispense Refill   • meloxicam (MOBIC) 15 MG tablet Take 1 Tab by mouth every day for 30 days. For 30 days then stop. Do not take  other NSAIDs. No refills. 30 Tab 0        Allergies:   No Known Allergies    Social Hx:   Social History     Socioeconomic History   • Marital status: Single     Spouse name: Not on file   • Number of children: Not on file   • Years of education: Not on file   • Highest education level: Not on file   Occupational History   • Not on file   Social Needs   • Financial resource strain: Not on file   • Food insecurity:     Worry: Not on file     Inability: Not on file   • Transportation needs:     Medical: Not on file     Non-medical: Not on file   Tobacco Use   • Smoking status: Never Smoker   • Smokeless tobacco: Never Used   Substance and Sexual Activity   • Alcohol use: Yes     Comment: occasionally   • Drug use: No   • Sexual activity: Yes     Partners: Male   Lifestyle   • Physical activity:     Days per week: Not on file     Minutes per session: Not on file   • Stress: Not on file   Relationships   • Social connections:     Talks on phone: Not on file     Gets together: Not on file     Attends Rastafarian service: Not on file     Active member of club or organization: Not on file     Attends meetings of clubs or organizations: Not on file     Relationship status: Not on file   • Intimate partner violence:     Fear of current or ex partner: Not on file     Emotionally abused: Not on file     Physically abused: Not on file     Forced sexual activity: Not on file   Other Topics Concern   •  Service No   • Blood Transfusions No   • Caffeine Concern No   • Occupational Exposure No   • Hobby Hazards No   • Sleep Concern No   • Stress Concern No   • Weight Concern No   • Special Diet Yes   • Back Care Yes   • Exercise Yes   • Bike Helmet No   • Seat Belt Yes   • Self-Exams No   Social History Narrative   • Not on file         EXAMINATION     Physical Exam:   Vitals: /64 (BP Location: Left arm, Patient Position: Sitting, BP Cuff Size: Adult)   Pulse 74   Temp 36.6 °C (97.9 °F) (Temporal)   Ht 1.753 m (5'  "9\")   Wt 67.3 kg (148 lb 5.9 oz)   SpO2 96%     Constitutional:   Body Habitus: Body mass index is 21.91 kg/m².  Cooperation: Fully cooperates with exam  Appearance: Well-groomed, well-nourished, not disheveled     Eyes: No scleral icterus to suggest severe liver disease, no proptosis to suggest severe hyperthyroid    ENT -no obvious auditory deficits, no obvious tongue lesions, tongue midline, no facial droop     Skin -no rashes or lesions noted     Respiratory-  breathing comfortable on room air, no audible wheezing    Cardiovascular- capillary refills less than 2 seconds. No lower extremity edema is noted.     Gastrointestinal - no obvious abdominal masses, No tenderness to palpation in the abdomen    Psychiatric- alert and oriented ×3. Normal affect.     Gait - normal gait, no use of ambulatory device, nonantalgic. the patient can toe walk with ease. the patient can heel walk with ease. the patient can tandem walk with ease. balance is appropriate..     Musculoskeletal -   Thoracic/Lumbar Spine/Sacral Spine/Hips   Inspection: No evidence of atrophy in bilateral lower extremities throughout     ROM: full  active range of motion with flexion, lateral flexion, and rotation bilaterally.   There is full  active range of motion with lumbar extension.      Palpation:   No tenderness to palpation in midline at T1-T12 levels. No tenderness to palpation in the left and right of the midline T1-L5, POSITIVE for tenderness to palpation to the para-midline region in the lower lumbar levels.  There is no tenderness to percussion throughout the lumbar spine including the upper lumbar spine over the level of the T1 transverse process fracture.  palpation over SI joint: negative bilaterally    palpation over buttock: negative bilaterally    palpation in hip or over the greater trochanters: negative bilaterally      Lumbar spine Special tests  Neuro tension  Straight leg test negative right, positive left    Slump test negative " right, positive left    Cross straight leg raise positive when doing straight leg raise on the right there was pain in the left    HIP  FAIR test negative bilaterally    Range of motion in the hips is within normal limits in flexion, extension, abduction, internal rotation, external rotation.    SI joint tests  Observation patient sits on one buttocks: Negative  SI joint compression negative bilaterally    SI joint distraction negative bilaterally    Thigh thrust test negative bilaterally    MARTIN test negative bilaterally       Neuro       Key points for the international standards for neurological classification of spinal cord injury (ISNCSCI) to light touch.     Dermatome R L                                      L2 2 2   L3 2 2   L4 2 2   L5 2 2   S1 2 2   S2 2 2         Motor Exam Lower Extremities    ? Myotome R L   Hip flexion L2 5 5   Knee extension L3 5 5   Ankle dorsiflexion L4 5 5   Toe extension L5 5 5   Ankle plantarflexion S1 5 5            Babinski sign negative bilaterally   Clonus of the ankle negative bilaterally     Reflexes  ?  R L               Patella  2+ 2+   Achilles   2+ 2+           MEDICAL DECISION MAKING    Medical records review: see under HPI section.     DATA    Labs:   No results found for: SODIUM, POTASSIUM, CHLORIDE, CO2, ANION, GLUCOSE, BUN, CREATININE, CALCIUM, ASTSGOT, ALTSGPT, TBILIRUBIN, ALBUMIN, TOTPROTEIN, GLOBULIN, AGRATIO]    No results found for: PROTHROMBTM, INR     No results found for: WBC, RBC, HEMOGLOBIN, HEMATOCRIT, MCV, MCH, MCHC, MPV, NEUTSPOLYS, LYMPHOCYTES, MONOCYTES, EOSINOPHILS, BASOPHILS, HYPOCHROMIA, ANISOCYTOSIS     No results found for: HBA1C     Imaging:   I personally reviewed following images, these are my reads  X-ray lumbar spine 9/23/2019 left L1 transverse process fracture.  No other acute findings.  I also reviewed the x-ray with the patient on 10/17/2019    IMAGING radiology reads. I reviewed the following radiology reads                                                        Results for orders placed in visit on 09/23/19   DX-LUMBAR SPINE-2 OR 3 VIEWS    Impression Age indeterminate, mildly displaced fracture of the left L1 transverse process.                                           Diagnosis   Visit Diagnoses     ICD-10-CM   1. Closed fracture of transverse process of lumbar vertebra, initial encounter ([facility]) left T1 S32.009A   2. Acute left-sided low back pain with left-sided sciatica M54.42           ASSESSMENT AND PLAN:  Mini Howe 33 y.o. female      Mini was seen today for new patient.    Diagnoses and all orders for this visit:    Closed fracture of transverse process of lumbar vertebra, initial encounter ([facility]) left T1  -     meloxicam (MOBIC) 15 MG tablet; Take 1 Tab by mouth every day for 30 days. For 30 days then stop. Do not take other NSAIDs. No refills.  -     MR-LUMBAR SPINE-W/O; Future    Acute left-sided low back pain with left-sided sciatica  -     meloxicam (MOBIC) 15 MG tablet; Take 1 Tab by mouth every day for 30 days. For 30 days then stop. Do not take other NSAIDs. No refills.  -     MR-LUMBAR SPINE-W/O; Future      The patient does have a left transverse process fracture of unknown etiology however on the x-ray it does appear more acute or subacute as there is no cortication around the fracture.  However there is no pain on this area on exam.  The patient's pain is more suggestive of a left lower lumbar disc herniation with possible radiculopathy.  I have ordered an MRI for further evaluation of this.  Discontinue clamshell brace.  Discontinue spinal orthosis.  These are typically not used to treat transverse process fractures.    Follow-up: After the above diagnostic studies         Please note that this dictation was created using voice recognition software. I have made every reasonable attempt to correct obvious errors but there may be errors of grammar and content that I may have overlooked prior to finalization of this  note.      Markel Arellano MD  Physical Medicine and Rehabilitation  Interventional Spine and Sports Physiatry  [facility]               Mohan White M.D.   CC Pedro Pablo Reina D.O.

## 2019-10-17 NOTE — Clinical Note
Dear Pedro Pablo Reina D.O. and Mohan Tanner M.D., Thank you for the referral of Mini Howe.  Please see my note for more details Should you have any questions or concerns please do not hesitate to contact me. Markel Arellano M.D.

## 2019-10-28 ENCOUNTER — APPOINTMENT (OUTPATIENT)
Dept: RADIOLOGY | Facility: MEDICAL CENTER | Age: 34
End: 2019-10-28
Attending: PHYSICAL MEDICINE & REHABILITATION
Payer: COMMERCIAL

## 2019-10-28 DIAGNOSIS — S32.009A CLOSED FRACTURE OF TRANSVERSE PROCESS OF LUMBAR VERTEBRA, INITIAL ENCOUNTER (HCC): ICD-10-CM

## 2019-10-28 DIAGNOSIS — M54.42 ACUTE LEFT-SIDED LOW BACK PAIN WITH LEFT-SIDED SCIATICA: ICD-10-CM

## 2019-10-28 PROCEDURE — 72148 MRI LUMBAR SPINE W/O DYE: CPT

## 2019-10-31 ENCOUNTER — APPOINTMENT (RX ONLY)
Dept: URBAN - METROPOLITAN AREA CLINIC 20 | Facility: CLINIC | Age: 34
Setting detail: DERMATOLOGY
End: 2019-10-31

## 2019-10-31 ENCOUNTER — OFFICE VISIT (OUTPATIENT)
Dept: PHYSICAL MEDICINE AND REHAB | Facility: MEDICAL CENTER | Age: 34
End: 2019-10-31
Payer: COMMERCIAL

## 2019-10-31 VITALS
WEIGHT: 143.3 LBS | SYSTOLIC BLOOD PRESSURE: 108 MMHG | BODY MASS INDEX: 21.22 KG/M2 | HEART RATE: 65 BPM | OXYGEN SATURATION: 97 % | TEMPERATURE: 97.8 F | DIASTOLIC BLOOD PRESSURE: 70 MMHG | HEIGHT: 69 IN

## 2019-10-31 DIAGNOSIS — M51.36 LUMBAR DEGENERATIVE DISC DISEASE: ICD-10-CM

## 2019-10-31 DIAGNOSIS — M54.42 ACUTE LEFT-SIDED LOW BACK PAIN WITH LEFT-SIDED SCIATICA: ICD-10-CM

## 2019-10-31 DIAGNOSIS — Z41.9 ENCOUNTER FOR PROCEDURE FOR PURPOSES OTHER THAN REMEDYING HEALTH STATE, UNSPECIFIED: ICD-10-CM

## 2019-10-31 PROCEDURE — 99214 OFFICE O/P EST MOD 30 MIN: CPT | Performed by: PHYSICAL MEDICINE & REHABILITATION

## 2019-10-31 PROCEDURE — ? SCITON BBL

## 2019-10-31 PROCEDURE — ? ADDITIONAL NOTES

## 2019-10-31 ASSESSMENT — PATIENT HEALTH QUESTIONNAIRE - PHQ9
2. FEELING DOWN, DEPRESSED, IRRITABLE, OR HOPELESS: 0
SUM OF ALL RESPONSES TO PHQ9 QUESTIONS 1 AND 2: 0
5. POOR APPETITE OR OVEREATING: 0
3. TROUBLE FALLING OR STAYING ASLEEP OR SLEEPING TOO MUCH: 0
1. LITTLE INTEREST OR PLEASURE IN DOING THINGS: 0
SUM OF ALL RESPONSES TO PHQ QUESTIONS 1-9: 1
9. THOUGHTS THAT YOU WOULD BE BETTER OFF DEAD, OR OF HURTING YOURSELF: 0
6. FEELING BAD ABOUT YOURSELF - OR THAT YOU ARE A FAILURE OR HAVE LET YOURSELF OR YOUR FAMILY DOWN: 0
8. MOVING OR SPEAKING SO SLOWLY THAT OTHER PEOPLE COULD HAVE NOTICED. OR THE OPPOSITE, BEING SO FIGETY OR RESTLESS THAT YOU HAVE BEEN MOVING AROUND A LOT MORE THAN USUAL: 0
7. TROUBLE CONCENTRATING ON THINGS, SUCH AS READING THE NEWSPAPER OR WATCHING TELEVISION: 1
4. FEELING TIRED OR HAVING LITTLE ENERGY: 0

## 2019-10-31 ASSESSMENT — LOCATION SIMPLE DESCRIPTION DERM: LOCATION SIMPLE: RIGHT CHEEK

## 2019-10-31 ASSESSMENT — LOCATION ZONE DERM: LOCATION ZONE: FACE

## 2019-10-31 ASSESSMENT — PAIN SCALES - GENERAL: PAINLEVEL: 7=MODERATE-SEVERE PAIN

## 2019-10-31 ASSESSMENT — LOCATION DETAILED DESCRIPTION DERM: LOCATION DETAILED: RIGHT INFERIOR CENTRAL MALAR CHEEK

## 2019-10-31 NOTE — PROCEDURE: SCITON BBL
Spot Size: Finesse Adapter Size: 15 x 15 mm square
Pulse Duration: 20
Spot Size: Finesse Adapter Size: 7 mm round
Consent: Written consent obtained, risks reviewed including but not limited to crusting, scabbing, blistering, scarring, darker or lighter pigmentary change, bruising, and/or incomplete response.
Hide Repetition Rate?: No
Cooling ?: Yes
Detail Level: Zone
Pulse Duration Units: milliseconds
Post-Care Instructions: I reviewed with the patient in detail post-care instructions. Patient should stay away from the sun and wear sun protection until treated areas are fully healed.
Indication Override (Will Not Show Above If Text Entered): vascular
Repetition Rate (Hz): 1
Price (Use Numbers Only, No Special Characters Or $): 125.00
Cooling (In C): 15
Location Override (Will Not Show Above If Text Entered): spot treatment
Treatment Number: 5
Fluence (J/Cm2): 25
Anesthesia Volume In Cc: 0
Preprocedure Text: The treatment areas were thoroughly cleaned. Clear ultrasound gel was applied to the treatment area. The area was treated with no immediate stacking of pulses.
Post Procedure Text: The patient tolerated the procedure well. Post care was reviewed with the patient.

## 2019-10-31 NOTE — PROGRESS NOTES
Follow up patient note  Interventional spine and sports physiatry, Physical medicine rehabilitation      Chief complaint:   Chief Complaint   Patient presents with   • Follow-Up     Back pain       HISTORY    Please see new patient note by Dr Arellano,  for more details.     HPI  Patient identification: Mini Howe 33 y.o. female  With Diagnoses of Lumbar degenerative disc disease worst L5-S1. with transitional anatomy  and Acute left-sided low back pain with left-sided sciatica were pertinent to this visit.       -The patient was here for follow-up of her presumed L1 transverse process fracture.  Overall the patient's pain was in the low back left worse than right occasionally radiating to the left buttocks.  In the interim the patient has been using meloxicam and has titrated down and off of this.  Overall 60% improvement in her pain.  Still has pain in the left low back radiating to the left buttocks however this is improved significantly and now is mild in intensity and intermittent.       ROS Red Flags :   Fever, Chills, Sweats: Denies  Involuntary Weight Loss: Denies  Bowel/Bladder Incontinence: Denies  Saddle Anesthesia: Denies        PMHx:   History reviewed. No pertinent past medical history.    PSHx:   Past Surgical History:   Procedure Laterality Date   • FOOT SURGERY         Family history   Family History   Problem Relation Age of Onset   • Cancer Father 64        brain cancer         Medications:   No outpatient medications have been marked as taking for the 10/31/19 encounter (Office Visit) with Markel Arellano M.D..        Current Outpatient Medications on File Prior to Visit   Medication Sig Dispense Refill   • meloxicam (MOBIC) 15 MG tablet Take 1 Tab by mouth every day for 30 days. For 30 days then stop. Do not take other NSAIDs. No refills. (Patient not taking: Reported on 10/31/2019) 30 Tab 0     No current facility-administered medications on file prior to visit.          Allergies:   No  "Known Allergies    Social Hx:   Social History     Socioeconomic History   • Marital status: Single     Spouse name: Not on file   • Number of children: Not on file   • Years of education: Not on file   • Highest education level: Not on file   Occupational History   • Not on file   Social Needs   • Financial resource strain: Not on file   • Food insecurity:     Worry: Not on file     Inability: Not on file   • Transportation needs:     Medical: Not on file     Non-medical: Not on file   Tobacco Use   • Smoking status: Never Smoker   • Smokeless tobacco: Never Used   Substance and Sexual Activity   • Alcohol use: Yes     Comment: occasionally   • Drug use: No   • Sexual activity: Yes     Partners: Male   Lifestyle   • Physical activity:     Days per week: Not on file     Minutes per session: Not on file   • Stress: Not on file   Relationships   • Social connections:     Talks on phone: Not on file     Gets together: Not on file     Attends Spiritism service: Not on file     Active member of club or organization: Not on file     Attends meetings of clubs or organizations: Not on file     Relationship status: Not on file   • Intimate partner violence:     Fear of current or ex partner: Not on file     Emotionally abused: Not on file     Physically abused: Not on file     Forced sexual activity: Not on file   Other Topics Concern   •  Service No   • Blood Transfusions No   • Caffeine Concern No   • Occupational Exposure No   • Hobby Hazards No   • Sleep Concern No   • Stress Concern No   • Weight Concern No   • Special Diet Yes   • Back Care Yes   • Exercise Yes   • Bike Helmet No   • Seat Belt Yes   • Self-Exams No   Social History Narrative   • Not on file         EXAMINATION     Physical Exam:   Vitals: /70 (BP Location: Right arm, Patient Position: Sitting, BP Cuff Size: Adult)   Pulse 65   Temp 36.6 °C (97.8 °F) (Temporal)   Ht 1.753 m (5' 9\")   Wt 65 kg (143 lb 4.8 oz)   SpO2 97% "     Constitutional:   Body Habitus: Body mass index is 21.16 kg/m².  Cooperation: Fully cooperates with exam  Appearance: Well-groomed no disheveled    Respiratory-  breathing comfortable on room air, no audible wheezing  Cardiovascular- capillary refills less than 2 seconds. No lower extremity edema is noted.   Psychiatric- alert and oriented ×3. Normal affect.    MSK: - see previous note for more details          MEDICAL DECISION MAKING    DATA    Labs:   No results found for: SODIUM, POTASSIUM, CHLORIDE, CO2, GLUCOSE, BUN, CREATININE, BUNCREATRAT, GLOMRATE     No results found for: PROTHROMBTM, INR     No results found for: WBC, RBC, HEMOGLOBIN, HEMATOCRIT, MCV, MCH, MCHC, MPV, NEUTSPOLYS, LYMPHOCYTES, MONOCYTES, EOSINOPHILS, BASOPHILS, HYPOCHROMIA, ANISOCYTOSIS     No results found for: HBA1C       Imaging:   I personally reviewed following images    MRI lumbar spine 10/28/2019.  Mild left L5-S1 neuroforaminal stenosis best seen on the lateral views with a disc bulge.  No overt nerve impingement at this level.  No signs of a transverse process fracture.    I reviewed the following radiology reports                                        Results for orders placed in visit on 10/28/19   MR-LUMBAR SPINE-W/O    Impression 1.  Minimal disc bulges at L1-2 and L5-S1. No central canal or neural foraminal narrowing.    2.  No evidence of marrow edema or fracture. The previous abnormality described on plain film radiographs likely represents a small left hypoplastic rib related to presence of transitional vertebrae.                                                                                Results for orders placed in visit on 09/23/19   DX-LUMBAR SPINE-2 OR 3 VIEWS    Impression Age indeterminate, mildly displaced fracture of the left L1 transverse process.                                           DIAGNOSIS   Visit Diagnoses     ICD-10-CM   1. Lumbar degenerative disc disease worst L5-S1. with transitional anatomy   M51.36   2. Acute left-sided low back pain with left-sided sciatica M54.42         ASSESSMENT and PLAN:     Mini Howe 33 y.o. female      Mini was seen today for follow-up.    Diagnoses and all orders for this visit:    Lumbar degenerative disc disease worst L5-S1. with transitional anatomy   -     REFERRAL TO PHYSICAL THERAPY Reason for Therapy: Eval/Treat/Report    Acute left-sided low back pain with left-sided sciatica  -     REFERRAL TO PHYSICAL THERAPY Reason for Therapy: Eval/Treat/Report        I reviewed the MRI.  The presumed transverse process fracture was actually not a transverse process fracture and instead is a hypoplastic rib.  There is no pain or tenderness over that area either.    For the patient's pain she does have transitional anatomy and degenerative disc disease of the L5-S1 region with neuroforaminal stenosis in that area which is minimal and a small disc protrusion.  Overall the patient is improving with conservative treatments.  I will discontinue meloxicam at this time.  I would like for the patient to work with physical therapy and a sports type setting to improve her endurance, range of motion and strength.  We discussed not using her back brace as there is no fracture.    Follow up: after PT    Thank you for allowing me to participate in the care of this patient. If you have any questions please not hesitate to contact me.          Please note that this dictation was created using voice recognition software. I have made every reasonable attempt to correct obvious errors but there may be errors of grammar and content that I may have overlooked prior to finalization of this note.      Markel Arellano MD  Interventional Spine and Sports Physiatry  Physical Medicine and Rehabilitation  [facility]

## 2019-11-19 ENCOUNTER — OFFICE VISIT (OUTPATIENT)
Dept: MEDICAL GROUP | Facility: LAB | Age: 34
End: 2019-11-19
Payer: COMMERCIAL

## 2019-11-19 VITALS
DIASTOLIC BLOOD PRESSURE: 66 MMHG | BODY MASS INDEX: 21.12 KG/M2 | OXYGEN SATURATION: 95 % | HEIGHT: 69 IN | SYSTOLIC BLOOD PRESSURE: 108 MMHG | HEART RATE: 72 BPM | WEIGHT: 142.6 LBS | TEMPERATURE: 99.6 F

## 2019-11-19 DIAGNOSIS — K90.0 CELIAC DISEASE: ICD-10-CM

## 2019-11-19 DIAGNOSIS — Z00.00 WELL ADULT EXAM: ICD-10-CM

## 2019-11-19 DIAGNOSIS — Z00.00 PREVENTATIVE HEALTH CARE: Primary | ICD-10-CM

## 2019-11-19 DIAGNOSIS — L71.9 ROSACEA: ICD-10-CM

## 2019-11-19 PROCEDURE — 99395 PREV VISIT EST AGE 18-39: CPT | Performed by: FAMILY MEDICINE

## 2019-11-19 NOTE — PROGRESS NOTES
Subjective:     CC:   Chief Complaint   Patient presents with   • Establish Care       HPI:   Mini Howe is a 33 y.o. female who presents for annual exam. She is feeling well and denies any complaints.    Patient has GYN provider: yes  Last pap: 2 years ago, wnl  Last mammo: Age 39  Last colonoscopy: Age 49  Last bone density test: Age 64  Qualify for hep C screen: No  Last Tdap: Done  Gardiasil: Aged out  Hx. STD's: None  Birth control: None     Menses every month with 28-31 days moderate bleeding.  Cramping is moderate.   She do take OTC analgesics for cramps.  No significant bloating/fluid retention, pelvic pain, or dyspareunia. No vaginal discharge   No breast tenderness, mass, nipple discharge, changes in size or contour, or abnormal cyclic discomfort.  She do not perform regular self breast exams.  Regular exercise: yes   Diet: gluten free    She  has no past medical history on file.  She  has a past surgical history that includes foot surgery.    Family History   Problem Relation Age of Onset   • Cancer Father 64        brain cancer       Social History     Socioeconomic History   • Marital status: Single     Spouse name: Not on file   • Number of children: Not on file   • Years of education: Not on file   • Highest education level: Not on file   Occupational History   • Not on file   Social Needs   • Financial resource strain: Not on file   • Food insecurity:     Worry: Not on file     Inability: Not on file   • Transportation needs:     Medical: Not on file     Non-medical: Not on file   Tobacco Use   • Smoking status: Never Smoker   • Smokeless tobacco: Never Used   Substance and Sexual Activity   • Alcohol use: Yes     Comment: occasionally   • Drug use: No   • Sexual activity: Yes     Partners: Male   Lifestyle   • Physical activity:     Days per week: Not on file     Minutes per session: Not on file   • Stress: Not on file   Relationships   • Social connections:     Talks on phone: Not on file      "Gets together: Not on file     Attends [employer] service: Not on file     Active member of club or organization: Not on file     Attends meetings of clubs or organizations: Not on file     Relationship status: Not on file   • Intimate partner violence:     Fear of current or ex partner: Not on file     Emotionally abused: Not on file     Physically abused: Not on file     Forced sexual activity: Not on file   Other Topics Concern   •  Service No   • Blood Transfusions No   • Caffeine Concern No   • Occupational Exposure No   • Hobby Hazards No   • Sleep Concern No   • Stress Concern No   • Weight Concern No   • Special Diet Yes   • Back Care Yes   • Exercise Yes   • Bike Helmet No   • Seat Belt Yes   • Self-Exams No   Social History Narrative   • Not on file       Patient Active Problem List    Diagnosis Date Noted   • Acute cystitis without hematuria 02/01/2019         No current outpatient medications on file.     No current facility-administered medications for this visit.      No Known Allergies    Review of Systems   Constitutional: Negative for fever, chills and malaise/fatigue.   HENT: Negative for congestion.    Eyes: Negative for pain.   Respiratory: Negative for cough and shortness of breath.    Cardiovascular: Negative for leg swelling.   Gastrointestinal: Negative for nausea, vomiting, abdominal pain and diarrhea.   Genitourinary: Negative for dysuria and hematuria.   Skin: Negative for rash.   Neurological: Negative for dizziness, focal weakness and headaches.   Endo/Heme/Allergies: Does not bruise/bleed easily.   Psychiatric/Behavioral: Negative for depression.  The patient is not nervous/anxious.      Objective:     /66 (BP Location: Left arm, Patient Position: Sitting)   Pulse 72   Temp 37.6 °C (99.6 °F) (Temporal)   Ht 1.753 m (5' 9\")   Wt 64.7 kg (142 lb 9.6 oz)   SpO2 95%   BMI 21.06 kg/m²   Body mass index is 21.06 kg/m².  Wt Readings from Last 4 Encounters:   11/19/19 64.7 kg " (142 lb 9.6 oz)   10/31/19 65 kg (143 lb 4.8 oz)   10/17/19 67.3 kg (148 lb 5.9 oz)   09/23/19 64.4 kg (142 lb)       Physical Exam:  Constitutional: Well-developed and well-nourished. Not diaphoretic. No distress.   Skin: Skin is warm and dry. No rash noted.  Head: Atraumatic without lesions.  Eyes: Conjunctivae and extraocular motions are normal. Pupils are equal, round, and reactive to light. No scleral icterus.   Ears:  External ears unremarkable. Tympanic membranes clear and intact.  Nose: Nares patent. Septum midline. Turbinates without erythema nor edema. No discharge.   Mouth/Throat: Dentition is good. Tongue normal. Oropharynx is clear and moist. Posterior pharynx without erythema or exudates.  Neck: Supple, trachea midline. Normal range of motion. No thyromegaly present. No lymphadenopathy--cervical or supraclavicular.  Cardiovascular: Regular rate and rhythm, S1 and S2 without murmur, rubs, or gallops.    Abdomen: Soft, non tender, and without distention. Active bowel sounds in all four quadrants. No rebound, guarding, masses or HSM.  Extremities: No cyanosis, clubbing, erythema, nor edema. Distal pulses intact and symmetric.   Musculoskeletal: All major joints AROM full in all directions without pain.  Neurological: Alert and oriented x 3.  Psychiatric:  Behavior, mood, and affect are appropriate.    Assessment and Plan:     1. Preventative health care  Up-to-date on preventative care, labs ordered, normal physical exam  - HEMOGLOBIN A1C; Future  - CBC WITH DIFFERENTIAL; Future  - Comp Metabolic Panel; Future  - Lipid Profile; Future  - TSH WITH REFLEX TO FT4; Future    2. Celiac disease  Diagnosed by gastroenterology, continue avoidance of gluten    3. Rosacea  Continue surveillance with Derm    Sees eye doctor and dentist   Sees dentist and   Labs per orders  Immunizations per orders  Patient counseled about skin care, diet, supplements, vitamins, safe sex and exercise.    Follow-up: 3 months   Please  note that this dictation was created using voice recognition software. I have made every reasonable attempt to correct obvious errors, but I expect that there are errors of grammar and possibly content that I did not discover before finalizing the note.

## 2019-12-09 ENCOUNTER — PHYSICAL THERAPY (OUTPATIENT)
Dept: PHYSICAL THERAPY | Facility: REHABILITATION | Age: 34
End: 2019-12-09
Attending: PHYSICAL MEDICINE & REHABILITATION
Payer: COMMERCIAL

## 2019-12-09 DIAGNOSIS — M54.42 ACUTE LEFT-SIDED LOW BACK PAIN WITH LEFT-SIDED SCIATICA: ICD-10-CM

## 2019-12-09 DIAGNOSIS — M51.36 LUMBAR DEGENERATIVE DISC DISEASE: ICD-10-CM

## 2019-12-09 PROCEDURE — 97535 SELF CARE MNGMENT TRAINING: CPT

## 2019-12-09 PROCEDURE — 97161 PT EVAL LOW COMPLEX 20 MIN: CPT

## 2019-12-09 ASSESSMENT — ENCOUNTER SYMPTOMS
PAIN TIMING: WHEN ACTIVE
PAIN SCALE AT LOWEST: 0
EXACERBATED BY: PROLONGED SITTING
EXACERBATED BY: RUNNING
QUALITY: SHARP
PAIN SCALE AT HIGHEST: 5
ALLEVIATING FACTORS: WALKING
QUALITY: ACHING
PAIN SCALE: 0
QUALITY: SHOOTING
EXACERBATED BY: SNEEZING
EXACERBATED BY: COUGHING
EXACERBATED BY: CARRYING
EXACERBATED BY: LIFTING
ALLEVIATING FACTORS: REST

## 2019-12-09 NOTE — OP THERAPY EVALUATION
Outpatient Physical Therapy  INITIAL EVALUATION    [facility] Therapy [address]  [address]  [place] [address]  Phone:  [phone]  Fax:  [phone]    Date of Evaluation: 12/09/2019    Patient: Mini Howe  YOB: 1985  MRN: [id number]     Referring Provider: Markel Arellano M.D.  [address]  [address]  [place] [address]   Referring Diagnosis Lumbar degenerative disc disease [M51.36];Acute left-sided low back pain with left-sided sciatica [M54.42]     Time Calculation  Start time: 0730  Stop time: 0828 Time Calculation (min): 58 minutes       Physical Therapy Occurrence Codes    Date of onset of impairment:  8/1/19   Date physical therapy care plan established or reviewed:  12/9/19   Date physical therapy treatment started:  12/9/19          Chief Complaint: Back Problem    Visit Diagnoses     ICD-10-CM   1. Lumbar degenerative disc disease M51.36   2. Acute left-sided low back pain with left-sided sciatica M54.42         Subjective:   History of Present Illness:     Mechanism of injury:  Aug 2019: She think it began as she was playing tennis and twisted wrong. The problem was slow and progressive.  Started as aching in the L hip, progressing to pain down the leg and spasms.  When the pain was not improving, she followed up with UC on 9/23/19.  Xrays showed L1 L TP fracture, chronicity unclear.  She was sent on to follow up with physiatry (Dr. Arellano) for ongoing complaints.  Upon completion of MRI- noted the fx was actually a transitional vertebrae.  Complaints suspected to be discogenic.  Given meds (which she took for about 1 week) and suggested PT.     Current complaints: Notes 60% overall improvement.  Concerned about a sharp pain across her back when sneezes.  Lifting anything more than 10# adds strain and pain to the spine.  Just began some slow jogging, which has been ok so far.  Denies N/T.    Prior level of function:   for the [employer]  of [place].  Full time.  Hobbies: active with yoga, running, snowboarding.    Sleep disturbance:  Not disrupted  Pain:     Current pain ratin    At best pain ratin    At worst pain ratin    Quality:  Sharp, aching and shooting    Pain timing:  When active    Relieving factors:  Rest and walking    Aggravating factors:  Coughing, carrying, sneezing, running, lifting and prolonged sitting    Progression:  Improving  Social Support:     Lives in:  Multiple-level home    Lives with:  Alone  Diagnostic Tests:     X-ray: abnormal      MRI studies: abnormal    Treatments:     Previous treatment:  Medication  Patient Goals:     Patient goals for therapy:  [place] with ADLs/IADLs, increased strength, return to sport/leisure activities, increased motion and decreased pain      History reviewed. No pertinent past medical history.  Past Surgical History:   Procedure Laterality Date   • FOOT SURGERY       Social History     Tobacco Use   • Smoking status: Never Smoker   • Smokeless tobacco: Never Used   Substance Use Topics   • Alcohol use: Yes     Comment: occasionally     Family and Occupational History     Patient does not qualify to have social determinant information on file (likely too young).   Socioeconomic History   • Marital status: Single     Spouse name: Not on file   • Number of children: Not on file   • Years of education: Not on file   • Highest education level: Not on file   Occupational History   • Not on file       Objective     Postural Observations  Seated posture: good  Standing posture: good        Neurological Testing     Reflexes   Left   Patellar (L4): normal (2+)  Achilles (S1): normal (2+)    Right   Patellar (L4): normal (2+)  Achilles (S1): normal (2+)    Myotome testing   Lumbar (left)   All left lumbar myotomes within normal limits    Lumbar (right)   All right lumbar myotomes within normal limits    Dermatome testing   Lumbar (left)   All left lumbar dermatomes intact    Lumbar  (right)   All right lumbar dermatomes intact    Palpation   Left   No palpable tenderness to the gluteus alisson and quadratus lumborum.   Hypertonic in the gluteus medius and piriformis.     Right   No palpable tenderness to the gluteus alisson.     Tenderness     Left Hip   Tenderness in the iliolumbar ligament.      Active Range of Motion     Lumbar   Flexion: within functional limits  Extension: decreased  Left lateral flexion: within functional limits  Right lateral flexion: within functional limits  Left rotation: within functional limits  Right rotation: within functional limits    Joint Play   Spine     Central PA [place]        T12: WFL       L1: WFL       L2: WFL       L3: WFL       L4: hypomobile       L5: hypomobile       S1: hypomobile    Unilateral PA Glide (left)        L4: hypomobile       L5: hypomobile        Strength:      Abdominals   Lower abdominals: Able to maintain neutral statically    Lower extremities   Normal left lower extremity strength  Normal right lower extremity strength    Tests       Lumbar spine (left)      Negative slump.     Left Pelvic Girdle/Sacrum   Negative: sacral thrust.     Left Hip   SLR: Positive.         Therapeutic Treatments and Modalities:     1. Functional Training, Self Care (CPT 08544), Education: spinal pathoanat, disc dynamics, goals of PT to regain lumbar ext mobility and core stab->return to normal activity.  Discussed neutral spine, how to brace for coughing/sneezing.  HEP with return demo: PPU with hips offset R.  Supine TA with active HS str/neural glide. 2x/day.  HO provided.     Time-based treatments/modalities:  Functional training, self care minutes (CPT 48401): 20 minutes       Assessment, Response and Plan:   Impairments: abnormal ADL function, abnormal or restricted ROM, activity intolerance, impaired physical strength and lacks appropriate home exercise program    Assessment details:  Ms. Howe is a 34 y.o female who presents to PT with complaint  of LBP x approx 5-6 month duration.  PT evaluation is most consistent with lumbar disc derangement.  Her problem is low in irritability and severity at this point.  She is noted to have decreased spinal extension, mild L LE neural tension and decreased core control/strength.  Pt is limited in her tolerance for sneezing, lifting, participation in yoga and running activities.  Skilled PT services are indicated to address the mentioned functional limitations and enhance QOL.     Prognosis: good    Goals:   Short Term Goals:   - Improve L SLR to full and painless  - Able to recruit TA in all functional positions  - Able to demonstrate appropriate lift without cuing  Short term goal time span:  1-2 weeks      Long Term Goals:    - Improve RMQ to 0/20  - Able to return to running/yoga with no more than 1/10 LBP  - Indep with HEP  Long term goal time span:  6-8 weeks    Plan:   Therapy options:  Physical therapy treatment to continue  Planned therapy interventions:  E Stim Unattended (CPT 75822), Functional Training, Self Care (CPT 85357), Hot or Cold Pack Therapy (CPT 60653), Manual Therapy (CPT 92012), Mechanical Traction (CPT 02013), Neuromuscular Re-education (CPT 19579), Therapeutic Exercise (CPT 86267) and Therapeutic Activities (CPT 35770)  Frequency:  1x week  Duration in weeks:  8  Discussed with:  Patient      Functional Assessment Used  Dwayne Gerald Low Back Pain and Disability Score: 8.33     Referring provider co-signature:  I have reviewed this plan of care and my co-signature certifies the need for services.  Certification Dates:   From 12/09/19   To 02/03/20    Physician Signature: ________________________________ Date: ______________

## 2019-12-16 ENCOUNTER — PHYSICAL THERAPY (OUTPATIENT)
Dept: PHYSICAL THERAPY | Facility: REHABILITATION | Age: 34
End: 2019-12-16
Attending: PHYSICAL MEDICINE & REHABILITATION
Payer: COMMERCIAL

## 2019-12-16 ENCOUNTER — APPOINTMENT (OUTPATIENT)
Dept: PHYSICAL THERAPY | Facility: REHABILITATION | Age: 34
End: 2019-12-16
Attending: FAMILY MEDICINE
Payer: COMMERCIAL

## 2019-12-16 DIAGNOSIS — M51.36 LUMBAR DEGENERATIVE DISC DISEASE: ICD-10-CM

## 2019-12-16 DIAGNOSIS — M54.42 ACUTE LEFT-SIDED LOW BACK PAIN WITH LEFT-SIDED SCIATICA: ICD-10-CM

## 2019-12-16 PROCEDURE — 97110 THERAPEUTIC EXERCISES: CPT

## 2019-12-16 NOTE — OP THERAPY DAILY TREATMENT
"  Outpatient Physical Therapy  DAILY TREATMENT     [facility] Physical Therapy [address]  [address]  [place] [address]  Phone:  [phone]  Fax:  [phone]    Date: 12/16/2019    Patient: Mini Howe  YOB: 1985  MRN: [id number]     Time Calculation  Start time: 0731  Stop time: 0804 Time Calculation (min): 33 minutes       Chief Complaint: Back Problem    Visit #: 2    SUBJECTIVE:  Has been doing the ext bias twice a day, not sure if it is making a difference.  Bracing abs is helping when sneezing.  Still some spasms, but seems less so.     OBJECTIVE:      Therapeutic Exercises (CPT 98720):     1. REIL, x 10, x10 with PT OP, prolonged over the top of a ball    2. LTR, x 10 ea way, with top LE reach over    3. TA re-ed with cuff, x 3 min    4. TA march with cuff, x 1 min    5. TA BKFO with cuff, x 2 min, control is poor, having to restart often    6. Ball roll with cuff, x 2 min    7. Ball bridge, 20x5\" hold      Time-based treatments/modalities:  Therapeutic exercise minutes (CPT 10382): 33 minutes       ASSESSMENT:   Response to treatment: Poor TA control against rotational forces.  BKFO, ball roll and bridge added to HEP.  Progress to L2 is better control is demonstrated. *going OOT x 2 weeks    PLAN/RECOMMENDATIONS:   Plan for treatment: therapy treatment to continue next visit.  Planned interventions for next visit: continue with current treatment.       "

## 2019-12-19 ENCOUNTER — APPOINTMENT (OUTPATIENT)
Dept: PHYSICAL THERAPY | Facility: REHABILITATION | Age: 34
End: 2019-12-19
Attending: PHYSICAL MEDICINE & REHABILITATION
Payer: COMMERCIAL

## 2019-12-23 ENCOUNTER — APPOINTMENT (OUTPATIENT)
Dept: PHYSICAL THERAPY | Facility: REHABILITATION | Age: 34
End: 2019-12-23
Payer: COMMERCIAL

## 2019-12-30 ENCOUNTER — APPOINTMENT (OUTPATIENT)
Dept: PHYSICAL THERAPY | Facility: REHABILITATION | Age: 34
End: 2019-12-30
Payer: COMMERCIAL

## 2020-01-06 ENCOUNTER — APPOINTMENT (OUTPATIENT)
Dept: PHYSICAL THERAPY | Facility: REHABILITATION | Age: 35
End: 2020-01-06
Attending: PHYSICAL MEDICINE & REHABILITATION
Payer: COMMERCIAL

## 2020-01-06 DIAGNOSIS — M54.42 ACUTE LEFT-SIDED LOW BACK PAIN WITH LEFT-SIDED SCIATICA: ICD-10-CM

## 2020-01-06 DIAGNOSIS — M51.36 LUMBAR DEGENERATIVE DISC DISEASE: ICD-10-CM

## 2020-01-06 PROCEDURE — 97110 THERAPEUTIC EXERCISES: CPT

## 2020-01-06 NOTE — OP THERAPY DAILY TREATMENT
Outpatient Physical Therapy  DAILY TREATMENT     [facility] Physical Therapy [address]  [address]  [place] [address]  Phone:  [phone]  Fax:  [phone]    Date: 01/06/2020    Patient: Mini Howe  YOB: 1985  MRN: [id number]     Time Calculation  Start time: 0800  Stop time: 0830 Time Calculation (min): 30 minutes       Chief Complaint: Back Problem    Visit #: 3    SUBJECTIVE:  Pt returns from vacation.  Happy with progress overall, reported at 95%.  Excited to have gone snowboarding without any trouble.     OBJECTIVE:      Therapeutic Exercises (CPT 03085):     1. REIL, x 10, x10 with PT OP, prolonged over the top of a ball    2. LTR, x 10 ea way, with top LE reach over    3. TA BKFO with cuff, x 1 min, improved to good control    4. Ball roll with cuff, x 2 min    5. Ball bridge, max effort hold, 2 min    6. Functional hip hinging:, air squat-> 10# goblet squat -> deadlift positions    7. Deadlift, box 20# x 10 reps    8. Anti rotation press out, L3 x 15 ea      Time-based treatments/modalities:  Therapeutic exercise minutes (CPT 22334): 30 minutes       ASSESSMENT:   Response to treatment: See d/c note    PLAN/RECOMMENDATIONS:   Plan for treatment: no further treatment needed.

## 2020-01-06 NOTE — OP THERAPY DISCHARGE SUMMARY
Outpatient Physical Therapy  DISCHARGE SUMMARY NOTE      [facility] Physical Therapy [address]  [address]  [place] [address]  Phone:  [phone]  Fax:  [phone]    Date of Visit: 01/06/2020    Patient: Mini Howe  YOB: 1985  MRN: [id number]     Referring Provider: Markel Arellano M.D.  [address]  [address]  [place] [address]   Referring Diagnosis Other intervertebral disc degeneration, lumbar region [M51.36];Lumbago with sciatica, left side [M54.42]     Physical Therapy Occurrence Codes    Date of onset of impairment:  8/1/19   Date physical therapy care plan established or reviewed:  12/9/19   Date physical therapy treatment started:  12/9/19          Functional Assessment Used        Your patient is being discharged from Physical Therapy with the following comments:   · Goals met    Comments:  Ms. Howe has been seen for 3 PT sessions treating her LBP.  Sx were most consistent with lumbar disc derangement.  She responded favorably to extension bias and retraining core stab/functional movement patterns with focus on neutral spine.  Pt is reporting 95% overall improvement.  Has returned to all typical activities without pain and plans to continue to develop core strength through yoga.  She is indep with HEP established in PT.      Recommendations:  D/C to HEP as no further skilled guidance is required.  Thank you for the referral!    Elizabeth Vale, PT, DPT    Date: 1/6/2020

## 2020-01-09 ENCOUNTER — APPOINTMENT (OUTPATIENT)
Dept: PHYSICAL THERAPY | Facility: REHABILITATION | Age: 35
End: 2020-01-09
Attending: PHYSICAL MEDICINE & REHABILITATION
Payer: COMMERCIAL

## 2020-01-13 ENCOUNTER — APPOINTMENT (OUTPATIENT)
Dept: PHYSICAL THERAPY | Facility: REHABILITATION | Age: 35
End: 2020-01-13
Attending: PHYSICAL MEDICINE & REHABILITATION
Payer: COMMERCIAL

## 2020-01-13 ENCOUNTER — HOSPITAL ENCOUNTER (OUTPATIENT)
Dept: LAB | Facility: MEDICAL CENTER | Age: 35
End: 2020-01-13
Attending: FAMILY MEDICINE
Payer: COMMERCIAL

## 2020-01-13 DIAGNOSIS — Z00.00 PREVENTATIVE HEALTH CARE: ICD-10-CM

## 2020-01-13 LAB
ALBUMIN SERPL BCP-MCNC: 4.6 G/DL (ref 3.2–4.9)
ALBUMIN/GLOB SERPL: 2 G/DL
ALP SERPL-CCNC: 46 U/L (ref 30–99)
ALT SERPL-CCNC: 22 U/L (ref 2–50)
ANION GAP SERPL CALC-SCNC: 9 MMOL/L (ref 0–11.9)
AST SERPL-CCNC: 19 U/L (ref 12–45)
BASOPHILS # BLD AUTO: 1.1 % (ref 0–1.8)
BASOPHILS # BLD: 0.04 K/UL (ref 0–0.12)
BILIRUB SERPL-MCNC: 0.7 MG/DL (ref 0.1–1.5)
BUN SERPL-MCNC: 9 MG/DL (ref 8–22)
CALCIUM SERPL-MCNC: 9.7 MG/DL (ref 8.5–10.5)
CHLORIDE SERPL-SCNC: 104 MMOL/L (ref 96–112)
CHOLEST SERPL-MCNC: 196 MG/DL (ref 100–199)
CO2 SERPL-SCNC: 27 MMOL/L (ref 20–33)
CREAT SERPL-MCNC: 0.9 MG/DL (ref 0.5–1.4)
EOSINOPHIL # BLD AUTO: 0.16 K/UL (ref 0–0.51)
EOSINOPHIL NFR BLD: 4.3 % (ref 0–6.9)
ERYTHROCYTE [DISTWIDTH] IN BLOOD BY AUTOMATED COUNT: 39.8 FL (ref 35.9–50)
EST. AVERAGE GLUCOSE BLD GHB EST-MCNC: 105 MG/DL
GLOBULIN SER CALC-MCNC: 2.3 G/DL (ref 1.9–3.5)
GLUCOSE SERPL-MCNC: 78 MG/DL (ref 65–99)
HBA1C MFR BLD: 5.3 % (ref 0–5.6)
HCT VFR BLD AUTO: 48.1 % (ref 37–47)
HDLC SERPL-MCNC: 78 MG/DL
HGB BLD-MCNC: 16.1 G/DL (ref 12–16)
IMM GRANULOCYTES # BLD AUTO: 0.01 K/UL (ref 0–0.11)
IMM GRANULOCYTES NFR BLD AUTO: 0.3 % (ref 0–0.9)
LDLC SERPL CALC-MCNC: 107 MG/DL
LYMPHOCYTES # BLD AUTO: 1.48 K/UL (ref 1–4.8)
LYMPHOCYTES NFR BLD: 39.7 % (ref 22–41)
MCH RBC QN AUTO: 30 PG (ref 27–33)
MCHC RBC AUTO-ENTMCNC: 33.5 G/DL (ref 33.6–35)
MCV RBC AUTO: 89.7 FL (ref 81.4–97.8)
MONOCYTES # BLD AUTO: 0.29 K/UL (ref 0–0.85)
MONOCYTES NFR BLD AUTO: 7.8 % (ref 0–13.4)
NEUTROPHILS # BLD AUTO: 1.75 K/UL (ref 2–7.15)
NEUTROPHILS NFR BLD: 46.8 % (ref 44–72)
NRBC # BLD AUTO: 0 K/UL
NRBC BLD-RTO: 0 /100 WBC
PLATELET # BLD AUTO: 298 K/UL (ref 164–446)
PMV BLD AUTO: 10.3 FL (ref 9–12.9)
POTASSIUM SERPL-SCNC: 4.3 MMOL/L (ref 3.6–5.5)
PROT SERPL-MCNC: 6.9 G/DL (ref 6–8.2)
RBC # BLD AUTO: 5.36 M/UL (ref 4.2–5.4)
SODIUM SERPL-SCNC: 140 MMOL/L (ref 135–145)
TRIGL SERPL-MCNC: 53 MG/DL (ref 0–149)
WBC # BLD AUTO: 3.7 K/UL (ref 4.8–10.8)

## 2020-01-13 PROCEDURE — 36415 COLL VENOUS BLD VENIPUNCTURE: CPT

## 2020-01-13 PROCEDURE — 80061 LIPID PANEL: CPT

## 2020-01-13 PROCEDURE — 83036 HEMOGLOBIN GLYCOSYLATED A1C: CPT

## 2020-01-13 PROCEDURE — 80053 COMPREHEN METABOLIC PANEL: CPT

## 2020-01-13 PROCEDURE — 85025 COMPLETE CBC W/AUTO DIFF WBC: CPT

## 2020-01-13 PROCEDURE — 84443 ASSAY THYROID STIM HORMONE: CPT

## 2020-01-14 LAB — TSH SERPL DL<=0.005 MIU/L-ACNC: 1.27 UIU/ML (ref 0.38–5.33)

## 2020-02-19 ENCOUNTER — OFFICE VISIT (OUTPATIENT)
Dept: MEDICAL GROUP | Facility: LAB | Age: 35
End: 2020-02-19
Payer: COMMERCIAL

## 2020-02-19 VITALS
TEMPERATURE: 99 F | HEIGHT: 69 IN | WEIGHT: 142 LBS | HEART RATE: 64 BPM | BODY MASS INDEX: 21.03 KG/M2 | SYSTOLIC BLOOD PRESSURE: 106 MMHG | DIASTOLIC BLOOD PRESSURE: 60 MMHG | OXYGEN SATURATION: 99 %

## 2020-02-19 DIAGNOSIS — D58.2 ELEVATED HEMOGLOBIN (HCC): ICD-10-CM

## 2020-02-19 PROCEDURE — 99213 OFFICE O/P EST LOW 20 MIN: CPT | Performed by: FAMILY MEDICINE

## 2020-02-19 ASSESSMENT — PATIENT HEALTH QUESTIONNAIRE - PHQ9: CLINICAL INTERPRETATION OF PHQ2 SCORE: 0

## 2020-02-20 ENCOUNTER — APPOINTMENT (RX ONLY)
Dept: URBAN - METROPOLITAN AREA CLINIC 20 | Facility: CLINIC | Age: 35
Setting detail: DERMATOLOGY
End: 2020-02-20

## 2020-02-20 DIAGNOSIS — Z41.9 ENCOUNTER FOR PROCEDURE FOR PURPOSES OTHER THAN REMEDYING HEALTH STATE, UNSPECIFIED: ICD-10-CM

## 2020-02-20 PROCEDURE — ? SCITON BBL

## 2020-02-20 ASSESSMENT — LOCATION SIMPLE DESCRIPTION DERM
LOCATION SIMPLE: RIGHT CHEEK
LOCATION SIMPLE: RIGHT LIP
LOCATION SIMPLE: INFERIOR FOREHEAD
LOCATION SIMPLE: LEFT CHEEK

## 2020-02-20 ASSESSMENT — LOCATION ZONE DERM
LOCATION ZONE: FACE
LOCATION ZONE: LIP

## 2020-02-20 ASSESSMENT — LOCATION DETAILED DESCRIPTION DERM
LOCATION DETAILED: RIGHT LOWER CUTANEOUS LIP
LOCATION DETAILED: LEFT INFERIOR CENTRAL MALAR CHEEK
LOCATION DETAILED: RIGHT INFERIOR CENTRAL MALAR CHEEK
LOCATION DETAILED: INFERIOR MID FOREHEAD

## 2020-02-20 NOTE — PROGRESS NOTES
"Subjective:     CC: Elevated HH    HPI:   Mini presents today with    Elevated HH:  This is an acute issue.  Patient here to follow-up labs and was concerned about her elevated H&H.  She has no history of sleep apnea, oxygen use, tobacco use, and is not new to [place].  She did do her labs in the morning after awakening.  All other labs within range.    Social History     Tobacco Use   • Smoking status: Never Smoker   • Smokeless tobacco: Never Used   Substance Use Topics   • Alcohol use: Yes     Comment: occasionally   • Drug use: No       No current Epic-ordered outpatient medications on file.     No current Epic-ordered facility-administered medications on file.        Allergies:  Patient has no known allergies.    Health Maintenance: Completed    ROS:  Gen: no fevers/chill, no changes in weight  Eyes: no changes in vision  ENT: no sore throat, no hearing loss, no bloody nose  Pulm: no sob, no cough  CV: no chest pain, no palpitations  GI: no nausea/vomiting, no diarrhea  : no dysuria  MSk: no myalgias  Skin: no rash  Neuro: no headaches, no numbness/tingling  Heme/Lymph: no easy bruising      Objective:       Exam:  /60 (BP Location: Left arm, Patient Position: Sitting, BP Cuff Size: Adult)   Pulse 64   Temp 37.2 °C (99 °F) (Temporal)   Ht 1.753 m (5' 9\")   Wt 64.4 kg (142 lb)   SpO2 99%   BMI 20.97 kg/m²  Body mass index is 20.97 kg/m².    Gen: Alert and oriented, No apparent distress.  Neck: Neck is supple without lymphadenopathy.  Lungs: Normal effort, CTA bilaterally, no wheezes, rhonchi, or rales  CV: Regular rate and rhythm. No murmurs, rubs, or gallops.               Ext: No clubbing, cyanosis, edema.      Assessment & Plan:     34 y.o. female with the following -     1. Elevated hemoglobin (HCC)  Possibly related to dehydration.  We will continue to monitor with annual labs.  Very mild elevation, at this point non-concerning.  We will continue to monitor.        Please note that this " dictation was created using voice recognition software. I have made every reasonable attempt to correct obvious errors, but I expect that there are errors of grammar and possibly content that I did not discover before finalizing the note.

## 2020-02-20 NOTE — PROCEDURE: SCITON BBL
Repetition Rate (Hz): 1
Additional Comments (Optional): spot change 14j, 20ms, 20c
Total Area (Optional- Include Units): Full Face
Fluence (J/Cm2): 12
Fluence (J/Cm2): 25
Anesthesia Volume In Cc: 0
Post-Care Instructions: I reviewed with the patient in detail post-care instructions. Patient should stay away from the sun and wear sun protection until treated areas are fully healed.
Pulse Duration: 20
Preprocedure Text: The treatment areas were thoroughly cleaned. Clear ultrasound gel was applied to the treatment area. The area was treated with no immediate stacking of pulses.
Spot Size: Finesse Adapter Size: 15 x 15 mm square
Hide Repetition Rate?: No
Cooling ?: Yes
Treatment Number: 6
Pulse Duration: 10
Pulse Duration Units: milliseconds
Post Procedure Text: The patient tolerated the procedure well. Post care was reviewed with the patient.
Consent: Written consent obtained, risks reviewed including but not limited to crusting, scabbing, blistering, scarring, darker or lighter pigmentary change, bruising, and/or incomplete response.
Fluence (J/Cm2): 14
Detail Level: Zone
Cooling (In C): 15
Price (Use Numbers Only, No Special Characters Or $): 200.00

## 2020-02-29 ENCOUNTER — OFFICE VISIT (OUTPATIENT)
Dept: URGENT CARE | Facility: CLINIC | Age: 35
End: 2020-02-29
Payer: COMMERCIAL

## 2020-02-29 ENCOUNTER — HOSPITAL ENCOUNTER (OUTPATIENT)
Facility: MEDICAL CENTER | Age: 35
End: 2020-02-29
Attending: NURSE PRACTITIONER
Payer: COMMERCIAL

## 2020-02-29 VITALS
HEART RATE: 84 BPM | TEMPERATURE: 100 F | RESPIRATION RATE: 12 BRPM | OXYGEN SATURATION: 97 % | BODY MASS INDEX: 20.73 KG/M2 | WEIGHT: 140 LBS | DIASTOLIC BLOOD PRESSURE: 60 MMHG | HEIGHT: 69 IN | SYSTOLIC BLOOD PRESSURE: 92 MMHG

## 2020-02-29 DIAGNOSIS — R30.0 DYSURIA: ICD-10-CM

## 2020-02-29 LAB
APPEARANCE UR: CLEAR
BILIRUB UR STRIP-MCNC: NORMAL MG/DL
COLOR UR AUTO: YELLOW
GLUCOSE UR STRIP.AUTO-MCNC: NORMAL MG/DL
INT CON NEG: NORMAL
INT CON POS: NORMAL
KETONES UR STRIP.AUTO-MCNC: NORMAL MG/DL
LEUKOCYTE ESTERASE UR QL STRIP.AUTO: NORMAL
NITRITE UR QL STRIP.AUTO: NORMAL
PH UR STRIP.AUTO: 6.5 [PH] (ref 5–8)
POC URINE PREGNANCY TEST: NEGATIVE
PROT UR QL STRIP: NORMAL MG/DL
RBC UR QL AUTO: NORMAL
SP GR UR STRIP.AUTO: <=1.005
UROBILINOGEN UR STRIP-MCNC: 0.2 MG/DL

## 2020-02-29 PROCEDURE — 81025 URINE PREGNANCY TEST: CPT | Performed by: NURSE PRACTITIONER

## 2020-02-29 PROCEDURE — 81002 URINALYSIS NONAUTO W/O SCOPE: CPT | Performed by: NURSE PRACTITIONER

## 2020-02-29 PROCEDURE — 99214 OFFICE O/P EST MOD 30 MIN: CPT | Performed by: NURSE PRACTITIONER

## 2020-02-29 PROCEDURE — 87086 URINE CULTURE/COLONY COUNT: CPT

## 2020-02-29 RX ORDER — CEPHALEXIN 500 MG/1
CAPSULE ORAL
COMMUNITY
Start: 2020-02-28 | End: 2020-03-11

## 2020-02-29 ASSESSMENT — ENCOUNTER SYMPTOMS
VOMITING: 0
FLANK PAIN: 0
CHILLS: 1
ABDOMINAL PAIN: 1
NAUSEA: 0

## 2020-02-29 ASSESSMENT — FIBROSIS 4 INDEX: FIB4 SCORE: 0.48

## 2020-02-29 NOTE — PROGRESS NOTES
Subjective:     Mini Howe is a 34 y.o. female who presents for Dysuria (x2 days, prescribed abx requesting UA/urine culture )      [place]rologist in [place] prescribed Keflex 500mg qid x 5 days for dysuria. Urgency q 5 min, dysuria. Worst UTI ever. Took Azo. Intense lower abdominal cramping. No diarrhea. LMP 2/5. No hematuria. Chills last night. No concerns for STI. No irregular vaginal discharge. No hx of abdominal surgeries.     Dysuria    This is a new problem. The current episode started in the past 7 days. The problem has been gradually improving (Post medication.). There has been no fever. There is no history of pyelonephritis. Associated symptoms include chills and urgency. Pertinent negatives include no discharge, flank pain, hematuria, nausea or vomiting. She has tried antibiotics and home medications for the symptoms. The treatment provided moderate relief.       No past medical history on file.    Past Surgical History:   Procedure Laterality Date   • FOOT SURGERY         Social History     Socioeconomic History   • Marital status: Single     Spouse name: Not on file   • Number of children: Not on file   • Years of education: Not on file   • Highest education level: Not on file   Occupational History   • Not on file   Social Needs   • Financial resource strain: Not on file   • Food insecurity     Worry: Not on file     Inability: Not on file   • Transportation needs     Medical: Not on file     Non-medical: Not on file   Tobacco Use   • Smoking status: Never Smoker   • Smokeless tobacco: Never Used   Substance and Sexual Activity   • Alcohol use: Yes     Comment: occasionally   • Drug use: No   • Sexual activity: Yes     Partners: Male   Lifestyle   • Physical activity     Days per week: Not on file     Minutes per session: Not on file   • Stress: Not on file   Relationships   • Social connections     Talks on phone: Not on file     Gets together: Not on file     Attends [employer] service: Not on file      "Active member of club or organization: Not on file     Attends meetings of clubs or organizations: Not on file     Relationship status: Not on file   • Intimate partner violence     Fear of current or ex partner: Not on file     Emotionally abused: Not on file     Physically abused: Not on file     Forced sexual activity: Not on file   Other Topics Concern   •  Service No   • Blood Transfusions No   • Caffeine Concern No   • Occupational Exposure No   • Hobby Hazards No   • Sleep Concern No   • Stress Concern No   • Weight Concern No   • Special Diet Yes   • Back Care Yes   • Exercise Yes   • Bike Helmet No   • Seat Belt Yes   • Self-Exams No   Social History Narrative   • Not on file        Family History   Problem Relation Age of Onset   • Cancer Father 64        brain cancer        No Known Allergies    Review of Systems   Constitutional: Positive for chills. Negative for fever.   Gastrointestinal: Positive for abdominal pain. Negative for blood in stool, nausea and vomiting.   Genitourinary: Positive for dysuria and urgency. Negative for flank pain and hematuria.   All other systems reviewed and are negative.       Objective:   BP (!) 92/60 (BP Location: Left arm, Patient Position: Sitting, BP Cuff Size: Adult)   Pulse 84   Temp 37.8 °C (100 °F) (Temporal)   Resp 12   Ht 1.753 m (5' 9\")   Wt 63.5 kg (140 lb)   LMP 02/05/2020 (Exact Date)   SpO2 97%   Breastfeeding No   BMI 20.67 kg/m²     Physical Exam  Vitals signs reviewed.   Constitutional:       General: She is not in acute distress.     Appearance: She is well-developed.   HENT:      Head: Normocephalic and atraumatic.      Right Ear: External ear normal.      Left Ear: External ear normal.      Nose: Nose normal.   Eyes:      Conjunctiva/sclera: Conjunctivae normal.   Neck:      Musculoskeletal: Normal range of motion.   Cardiovascular:      Rate and Rhythm: Normal rate and regular rhythm.   Pulmonary:      Effort: Pulmonary effort is " normal.      Breath sounds: Normal breath sounds.   Abdominal:      General: Abdomen is flat. Bowel sounds are normal. There is no distension.      Palpations: Abdomen is soft.      Tenderness: There is abdominal tenderness in the right lower quadrant and suprapubic area. There is no right CVA tenderness, left CVA tenderness, guarding or rebound.   Musculoskeletal: Normal range of motion.   Skin:     General: Skin is warm and dry.      Findings: No rash.   Neurological:      General: No focal deficit present.      Mental Status: She is alert and oriented to person, place, and time.      GCS: GCS eye subscore is 4. GCS verbal subscore is 5. GCS motor subscore is 6.   Psychiatric:         Mood and Affect: Mood normal.         Speech: Speech normal.         Behavior: Behavior normal.         Thought Content: Thought content normal.         Judgment: Judgment normal.         Assessment/Plan:   1. Dysuria  - POCT Urinalysis  - URINE CULTURE(NEW); Future    Other orders  - cephALEXin (KEFLEX) 500 MG Cap    Results for orders placed or performed during the hospital encounter of 02/29/20   URINE CULTURE(NEW)   Result Value Ref Range    Significant Indicator NEG     Source UR     Site -     Culture Result No growth at 48 hours.      Continue with Keflex, increase water intake.     -Oral Hydration: Drink plenty of water.  -Take antibiotic as prescribed.  -Follow up with PCP.    Follow up urgently for new or persistent abdominal pain, flank pain, difficulty with urination, fevers, vomiting, weakness, tachycardia, or any other concerns    Differential diagnosis, natural history, supportive care, and indications for immediate follow-up discussed.

## 2020-02-29 NOTE — PATIENT INSTRUCTIONS
Urinary Tract Infection, Adult  A urinary tract infection (UTI) is an infection of any part of the urinary tract, which includes the kidneys, ureters, bladder, and urethra. These organs make, store, and get rid of urine in the body. UTI can be a bladder infection (cystitis) or kidney infection (pyelonephritis).  What are the causes?  This infection may be caused by fungi, viruses, or bacteria. Bacteria are the most common cause of UTIs. This condition can also be caused by repeated incomplete emptying of the bladder during urination.  What increases the risk?  This condition is more likely to develop if:  · You ignore your need to urinate or hold urine for long periods of time.  · You do not empty your bladder completely during urination.  · You wipe back to front after urinating or having a bowel movement, if you are female.  · You are uncircumcised, if you are male.  · You are constipated.  · You have a urinary catheter that stays in place (indwelling).  · You have a weak defense (immune) system.  · You have a medical condition that affects your bowels, kidneys, or bladder.  · You have diabetes.  · You take antibiotic medicines frequently or for long periods of time, and the antibiotics no longer work well against certain types of infections (antibiotic resistance).  · You take medicines that irritate your urinary tract.  · You are exposed to chemicals that irritate your urinary tract.  · You are female.  What are the signs or symptoms?  Symptoms of this condition include:  · Fever.  · Frequent urination or passing small amounts of urine frequently.  · Needing to urinate urgently.  · Pain or burning with urination.  · Urine that smells bad or unusual.  · Cloudy urine.  · Pain in the lower abdomen or back.  · Trouble urinating.  · Blood in the urine.  · Vomiting or being less hungry than normal.  · Diarrhea or abdominal pain.  · Vaginal discharge, if you are female.  How is this diagnosed?  This condition is  diagnosed with a medical history and physical exam. You will also need to provide a urine sample to test your urine. Other tests may be done, including:  · Blood tests.  · Sexually transmitted disease (STD) testing.  If you have had more than one UTI, a cystoscopy or imaging studies may be done to determine the cause of the infections.  How is this treated?  Treatment for this condition often includes a combination of two or more of the following:  · Antibiotic medicine.  · Other medicines to treat less common causes of UTI.  · Over-the-counter medicines to treat pain.  · Drinking enough water to stay hydrated.  Follow these instructions at home:  · Take over-the-counter and prescription medicines only as told by your health care provider.  · If you were prescribed an antibiotic, take it as told by your health care provider. Do not stop taking the antibiotic even if you start to feel better.  · Avoid alcohol, caffeine, tea, and carbonated beverages. They can irritate your bladder.  · Drink enough fluid to keep your urine clear or pale yellow.  · Keep all follow-up visits as told by your health care provider. This is important.  · Make sure to:  ¨ Empty your bladder often and completely. Do not hold urine for long periods of time.  ¨ Empty your bladder before and after sex.  ¨ Wipe from front to back after a bowel movement if you are female. Use each tissue one time when you wipe.  Contact a health care provider if:  · You have back pain.  · You have a fever.  · You feel nauseous or vomit.  · Your symptoms do not get better after 3 days.  · Your symptoms go away and then return.  Get help right away if:  · You have severe back pain or lower abdominal pain.  · You are vomiting and cannot keep down any medicines or water.  This information is not intended to replace advice given to you by your health care provider. Make sure you discuss any questions you have with your health care provider.  Document Released:  09/27/2006 Document Revised: 05/31/2017 Document Reviewed: 11/07/2016  [employer] Interactive Patient Education © 2017 [employer]  Dysuria  Introduction  Dysuria is pain or discomfort while urinating. The pain or discomfort may be felt in the tube that carries urine out of the bladder (urethra) or in the surrounding tissue of the genitals. The pain may also be felt in the groin area, lower abdomen, and lower back. You may have to urinate frequently or have the sudden feeling that you have to urinate (urgency). Dysuria can affect both men and women, but is more common in women.  Dysuria can be caused by many different things, including:  · Urinary tract infection in women.  · Infection of the kidney or bladder.  · Kidney stones or bladder stones.  · Certain sexually transmitted infections (STIs), such as chlamydia.  · Dehydration.  · Inflammation of the vagina.  · Use of certain medicines.  · Use of certain soaps or scented products that cause irritation.  Follow these instructions at home:  Watch your dysuria for any changes. The following actions may help to reduce any discomfort you are feeling:  · Drink enough fluid to keep your urine clear or pale yellow.  · Empty your bladder often. Avoid holding urine for long periods of time.  · After a bowel movement or urination, women should cleanse from front to back, using each tissue only once.  · Empty your bladder after sexual intercourse.  · Take medicines only as directed by your health care provider.  · If you were prescribed an antibiotic medicine, finish it all even if you start to feel better.  · Avoid caffeine, tea, and alcohol. They can irritate the bladder and make dysuria worse. In men, alcohol may irritate the prostate.  · Keep all follow-up visits as directed by your health care provider. This is important.  · If you had any tests done to find the cause of dysuria, it is your responsibility to obtain your test results. Ask the lab or department performing  the test when and how you will get your results. Talk with your health care provider if you have any questions about your results.  Contact a health care provider if:  · You develop pain in your back or sides.  · You have a fever.  · You have nausea or vomiting.  · You have blood in your urine.  · You are not urinating as often as you usually do.  Get help right away if:  · You pain is severe and not relieved with medicines.  · You are unable to hold down any fluids.  · You or someone else notices a change in your mental function.  · You have a rapid heartbeat at rest.  · You have shaking or chills.  · You feel extremely weak.  This information is not intended to replace advice given to you by your health care provider. Make sure you discuss any questions you have with your health care provider.  Document Released: 09/15/2005 Document Revised: 05/25/2017 Document Reviewed: 08/13/2015  © 2017 [employer]

## 2020-03-01 DIAGNOSIS — R30.0 DYSURIA: ICD-10-CM

## 2020-03-03 LAB
BACTERIA UR CULT: NORMAL
SIGNIFICANT IND 70042: NORMAL
SITE SITE: NORMAL
SOURCE SOURCE: NORMAL

## 2020-03-03 ASSESSMENT — ENCOUNTER SYMPTOMS
BLOOD IN STOOL: 0
FEVER: 0

## 2020-03-11 ENCOUNTER — OFFICE VISIT (OUTPATIENT)
Dept: MEDICAL GROUP | Facility: LAB | Age: 35
End: 2020-03-11
Payer: COMMERCIAL

## 2020-03-11 VITALS
WEIGHT: 141.8 LBS | OXYGEN SATURATION: 99 % | BODY MASS INDEX: 21 KG/M2 | HEIGHT: 69 IN | SYSTOLIC BLOOD PRESSURE: 110 MMHG | HEART RATE: 79 BPM | DIASTOLIC BLOOD PRESSURE: 62 MMHG | TEMPERATURE: 99.4 F

## 2020-03-11 DIAGNOSIS — N91.2 AMENORRHEA: ICD-10-CM

## 2020-03-11 DIAGNOSIS — Z23 NEED FOR VACCINATION: ICD-10-CM

## 2020-03-11 LAB
INT CON NEG: NORMAL
INT CON POS: NORMAL
POC URINE PREGNANCY TEST: POSITIVE

## 2020-03-11 PROCEDURE — 90686 IIV4 VACC NO PRSV 0.5 ML IM: CPT | Performed by: FAMILY MEDICINE

## 2020-03-11 PROCEDURE — 99214 OFFICE O/P EST MOD 30 MIN: CPT | Mod: 25 | Performed by: FAMILY MEDICINE

## 2020-03-11 PROCEDURE — 81025 URINE PREGNANCY TEST: CPT | Performed by: FAMILY MEDICINE

## 2020-03-11 PROCEDURE — 90471 IMMUNIZATION ADMIN: CPT | Performed by: FAMILY MEDICINE

## 2020-03-11 ASSESSMENT — FIBROSIS 4 INDEX: FIB4 SCORE: 0.48

## 2020-03-11 NOTE — PROGRESS NOTES
"Subjective:     CC: Amenorrhea     HPI:   Mini presents today with     Amenorrhea:  This is an acute issue.  Patient states that the first day of her last menstrual period was have February 5, 2020.  She has been actively trying to become pregnant.  This is a wanted pregnancy.  She does not drink, smoke, or take any illicit drugs.  She does not have any cats.  She exercises and is very healthy.      Social History     Tobacco Use   • Smoking status: Never Smoker   • Smokeless tobacco: Never Used   Substance Use Topics   • Alcohol use: Yes     Comment: occasionally   • Drug use: No       No current Epic-ordered outpatient medications on file.     No current Epic-ordered facility-administered medications on file.        Allergies:  Patient has no known allergies.      ROS:  Gen: no fevers/chill, no changes in weight  Eyes: no changes in vision  ENT: no sore throat, no hearing loss, no bloody nose  Pulm: no sob, no cough  CV: no chest pain, no palpitations  GI: no nausea/vomiting, no diarrhea  : no dysuria  MSk: no myalgias  Skin: no rash  Neuro: no headaches, no numbness/tingling  Heme/Lymph: no easy bruising      Objective:       Exam:  /62 (BP Location: Left arm, Patient Position: Sitting)   Pulse 79   Temp 37.4 °C (99.4 °F) (Temporal)   Ht 1.753 m (5' 9\")   Wt 64.3 kg (141 lb 12.8 oz)   SpO2 99%   BMI 20.94 kg/m²  Body mass index is 20.94 kg/m².    Gen: Alert and oriented, No apparent distress.  Neck: Neck is supple without lymphadenopathy.  Lungs: Normal effort, CTA bilaterally, no wheezes, rhonchi, or rales  CV: Regular rate and rhythm. No murmurs, rubs, or gallops.               Ext: No clubbing, cyanosis, edema.      Assessment & Plan:     34 y.o. female with the following -     1. Amenorrhea  Point-of-care pregnancy test positive today.  Counseled on pregnancy health.  Referral to OB/GYN placed.  - REFERRAL TO OB/GYN    2. Need for vaccination  Administered today  - Influenza Vaccine Quad " Injection (PF)        Please note that this dictation was created using voice recognition software. I have made every reasonable attempt to correct obvious errors, but I expect that there are errors of grammar and possibly content that I did not discover before finalizing the note.

## 2020-06-19 ENCOUNTER — APPOINTMENT (RX ONLY)
Dept: URBAN - METROPOLITAN AREA CLINIC 4 | Facility: CLINIC | Age: 35
Setting detail: DERMATOLOGY
End: 2020-06-19

## 2020-06-19 DIAGNOSIS — D22 MELANOCYTIC NEVI: ICD-10-CM

## 2020-06-19 DIAGNOSIS — L57.8 OTHER SKIN CHANGES DUE TO CHRONIC EXPOSURE TO NONIONIZING RADIATION: ICD-10-CM

## 2020-06-19 PROBLEM — D48.5 NEOPLASM OF UNCERTAIN BEHAVIOR OF SKIN: Status: ACTIVE | Noted: 2020-06-19

## 2020-06-19 PROCEDURE — ? BIOPSY BY SHAVE METHOD

## 2020-06-19 PROCEDURE — ? COUNSELING

## 2020-06-19 PROCEDURE — 11102 TANGNTL BX SKIN SINGLE LES: CPT

## 2020-06-19 PROCEDURE — ? ADDITIONAL NOTES

## 2020-06-19 PROCEDURE — 99212 OFFICE O/P EST SF 10 MIN: CPT | Mod: 25

## 2020-06-19 ASSESSMENT — LOCATION DETAILED DESCRIPTION DERM
LOCATION DETAILED: LEFT LATERAL ABDOMEN
LOCATION DETAILED: RIGHT CENTRAL MALAR CHEEK

## 2020-06-19 ASSESSMENT — LOCATION SIMPLE DESCRIPTION DERM
LOCATION SIMPLE: RIGHT CHEEK
LOCATION SIMPLE: ABDOMEN

## 2020-06-19 ASSESSMENT — LOCATION ZONE DERM
LOCATION ZONE: TRUNK
LOCATION ZONE: FACE

## 2020-06-19 NOTE — PROCEDURE: ADDITIONAL NOTES
Detail Level: Simple
Additional Notes: Includes spot of concern mentioned on intake.\\nDiscussed using just lidocaine without epinephrine

## 2020-06-19 NOTE — HPI: SKIN LESION
Is This A New Presentation, Or A Follow-Up?: Mole
What Type Of Note Output Would You Prefer (Optional)?: Standard Output
How Severe Is Your Skin Lesion?: mild
Has Your Skin Lesion Been Treated?: not been treated
Additional History: Patient is 19 weeks pregnant.

## 2020-08-15 ENCOUNTER — APPOINTMENT (OUTPATIENT)
Dept: OTHER | Facility: IMAGING CENTER | Age: 35
End: 2020-08-15
Payer: COMMERCIAL

## 2020-09-08 ENCOUNTER — APPOINTMENT (OUTPATIENT)
Dept: OTHER | Facility: IMAGING CENTER | Age: 35
End: 2020-09-08
Payer: COMMERCIAL

## 2020-10-18 ENCOUNTER — ANESTHESIA (OUTPATIENT)
Dept: ANESTHESIOLOGY | Facility: MEDICAL CENTER | Age: 35
End: 2020-10-18
Payer: COMMERCIAL

## 2020-10-18 ENCOUNTER — HOSPITAL ENCOUNTER (INPATIENT)
Facility: MEDICAL CENTER | Age: 35
LOS: 3 days | End: 2020-10-21
Attending: OBSTETRICS & GYNECOLOGY | Admitting: OBSTETRICS & GYNECOLOGY
Payer: COMMERCIAL

## 2020-10-18 ENCOUNTER — ANESTHESIA EVENT (OUTPATIENT)
Dept: ANESTHESIOLOGY | Facility: MEDICAL CENTER | Age: 35
End: 2020-10-18
Payer: COMMERCIAL

## 2020-10-18 LAB
ABO GROUP BLD: NORMAL
BASOPHILS # BLD AUTO: 0.4 % (ref 0–1.8)
BASOPHILS # BLD: 0.03 K/UL (ref 0–0.12)
COVID ORDER STATUS COVID19: NORMAL
CRYSTALS AMN MICRO: NORMAL
EOSINOPHIL # BLD AUTO: 0.05 K/UL (ref 0–0.51)
EOSINOPHIL NFR BLD: 0.6 % (ref 0–6.9)
ERYTHROCYTE [DISTWIDTH] IN BLOOD BY AUTOMATED COUNT: 38.8 FL (ref 35.9–50)
HBV SURFACE AG SERPL QL IA: NORMAL
HCT VFR BLD AUTO: 35.8 % (ref 37–47)
HGB BLD-MCNC: 12.1 G/DL (ref 12–16)
HOLDING TUBE BB 8507: NORMAL
IMM GRANULOCYTES # BLD AUTO: 0.12 K/UL (ref 0–0.11)
IMM GRANULOCYTES NFR BLD AUTO: 1.5 % (ref 0–0.9)
LYMPHOCYTES # BLD AUTO: 1.82 K/UL (ref 1–4.8)
LYMPHOCYTES NFR BLD: 22.5 % (ref 22–41)
MCH RBC QN AUTO: 29.2 PG (ref 27–33)
MCHC RBC AUTO-ENTMCNC: 33.8 G/DL (ref 33.6–35)
MCV RBC AUTO: 86.3 FL (ref 81.4–97.8)
MONOCYTES # BLD AUTO: 0.72 K/UL (ref 0–0.85)
MONOCYTES NFR BLD AUTO: 8.9 % (ref 0–13.4)
NEUTROPHILS # BLD AUTO: 5.36 K/UL (ref 2–7.15)
NEUTROPHILS NFR BLD: 66.1 % (ref 44–72)
NRBC # BLD AUTO: 0 K/UL
NRBC BLD-RTO: 0 /100 WBC
PLATELET # BLD AUTO: 269 K/UL (ref 164–446)
PMV BLD AUTO: 10.1 FL (ref 9–12.9)
RBC # BLD AUTO: 4.15 M/UL (ref 4.2–5.4)
RH BLD: NORMAL
RUBV IGG SERPL IA-ACNC: NORMAL
SARS-COV+SARS-COV-2 AG RESP QL IA.RAPID: NOTDETECTED
SPECIMEN SOURCE: NORMAL
STREP GP B DNA PCR: POSITIVE
TREPONEMA PALLIDUM IGG+IGM AB [PRESENCE] IN SERUM OR PLASMA BY IMMUNOASSAY: NORMAL
WBC # BLD AUTO: 8.1 K/UL (ref 4.8–10.8)

## 2020-10-18 PROCEDURE — 700111 HCHG RX REV CODE 636 W/ 250 OVERRIDE (IP): Performed by: ANESTHESIOLOGY

## 2020-10-18 PROCEDURE — 700111 HCHG RX REV CODE 636 W/ 250 OVERRIDE (IP)

## 2020-10-18 PROCEDURE — 87426 SARSCOV CORONAVIRUS AG IA: CPT

## 2020-10-18 PROCEDURE — 770002 HCHG ROOM/CARE - OB PRIVATE (112)

## 2020-10-18 PROCEDURE — 36415 COLL VENOUS BLD VENIPUNCTURE: CPT

## 2020-10-18 PROCEDURE — C9803 HOPD COVID-19 SPEC COLLECT: HCPCS | Performed by: OBSTETRICS & GYNECOLOGY

## 2020-10-18 PROCEDURE — 700111 HCHG RX REV CODE 636 W/ 250 OVERRIDE (IP): Performed by: OBSTETRICS & GYNECOLOGY

## 2020-10-18 PROCEDURE — 85025 COMPLETE CBC W/AUTO DIFF WBC: CPT

## 2020-10-18 PROCEDURE — 700105 HCHG RX REV CODE 258: Performed by: OBSTETRICS & GYNECOLOGY

## 2020-10-18 PROCEDURE — 89060 EXAM SYNOVIAL FLUID CRYSTALS: CPT

## 2020-10-18 PROCEDURE — 700105 HCHG RX REV CODE 258: Performed by: ANESTHESIOLOGY

## 2020-10-18 RX ORDER — BUPIVACAINE HYDROCHLORIDE 2.5 MG/ML
INJECTION, SOLUTION EPIDURAL; INFILTRATION; INTRACAUDAL
Status: COMPLETED
Start: 2020-10-18 | End: 2020-10-18

## 2020-10-18 RX ORDER — MISOPROSTOL 200 UG/1
800 TABLET ORAL
Status: DISCONTINUED | OUTPATIENT
Start: 2020-10-18 | End: 2020-10-19 | Stop reason: HOSPADM

## 2020-10-18 RX ORDER — ROPIVACAINE HYDROCHLORIDE 2 MG/ML
INJECTION, SOLUTION EPIDURAL; INFILTRATION; PERINEURAL CONTINUOUS
Status: DISCONTINUED | OUTPATIENT
Start: 2020-10-18 | End: 2020-10-19

## 2020-10-18 RX ORDER — DEXTROSE, SODIUM CHLORIDE, SODIUM LACTATE, POTASSIUM CHLORIDE, AND CALCIUM CHLORIDE 5; .6; .31; .03; .02 G/100ML; G/100ML; G/100ML; G/100ML; G/100ML
INJECTION, SOLUTION INTRAVENOUS CONTINUOUS
Status: DISCONTINUED | OUTPATIENT
Start: 2020-10-18 | End: 2020-10-19

## 2020-10-18 RX ORDER — SODIUM CHLORIDE, SODIUM LACTATE, POTASSIUM CHLORIDE, CALCIUM CHLORIDE 600; 310; 30; 20 MG/100ML; MG/100ML; MG/100ML; MG/100ML
300 INJECTION, SOLUTION INTRAVENOUS ONCE
Status: COMPLETED | OUTPATIENT
Start: 2020-10-18 | End: 2020-10-19

## 2020-10-18 RX ORDER — ROPIVACAINE HYDROCHLORIDE 2 MG/ML
INJECTION, SOLUTION EPIDURAL; INFILTRATION; PERINEURAL
Status: COMPLETED
Start: 2020-10-18 | End: 2020-10-18

## 2020-10-18 RX ORDER — SODIUM CHLORIDE, SODIUM LACTATE, POTASSIUM CHLORIDE, AND CALCIUM CHLORIDE .6; .31; .03; .02 G/100ML; G/100ML; G/100ML; G/100ML
1000 INJECTION, SOLUTION INTRAVENOUS
Status: COMPLETED | OUTPATIENT
Start: 2020-10-18 | End: 2020-10-19

## 2020-10-18 RX ORDER — METHYLERGONOVINE MALEATE 0.2 MG/ML
0.2 INJECTION INTRAVENOUS
Status: DISCONTINUED | OUTPATIENT
Start: 2020-10-18 | End: 2020-10-19 | Stop reason: HOSPADM

## 2020-10-18 RX ORDER — ALUMINA, MAGNESIA, AND SIMETHICONE 2400; 2400; 240 MG/30ML; MG/30ML; MG/30ML
30 SUSPENSION ORAL EVERY 6 HOURS PRN
Status: DISCONTINUED | OUTPATIENT
Start: 2020-10-18 | End: 2020-10-19 | Stop reason: HOSPADM

## 2020-10-18 RX ORDER — SODIUM CHLORIDE, SODIUM LACTATE, POTASSIUM CHLORIDE, CALCIUM CHLORIDE 600; 310; 30; 20 MG/100ML; MG/100ML; MG/100ML; MG/100ML
INJECTION, SOLUTION INTRAVENOUS CONTINUOUS
Status: DISPENSED | OUTPATIENT
Start: 2020-10-18 | End: 2020-10-18

## 2020-10-18 RX ORDER — BUPIVACAINE HYDROCHLORIDE 2.5 MG/ML
INJECTION, SOLUTION EPIDURAL; INFILTRATION; INTRACAUDAL PRN
Status: DISCONTINUED | OUTPATIENT
Start: 2020-10-18 | End: 2020-10-19 | Stop reason: SURG

## 2020-10-18 RX ORDER — SODIUM CHLORIDE, SODIUM LACTATE, POTASSIUM CHLORIDE, AND CALCIUM CHLORIDE .6; .31; .03; .02 G/100ML; G/100ML; G/100ML; G/100ML
250 INJECTION, SOLUTION INTRAVENOUS PRN
Status: DISCONTINUED | OUTPATIENT
Start: 2020-10-18 | End: 2020-10-19 | Stop reason: HOSPADM

## 2020-10-18 RX ADMIN — FENTANYL CITRATE 100 MCG: 50 INJECTION, SOLUTION INTRAMUSCULAR; INTRAVENOUS at 22:28

## 2020-10-18 RX ADMIN — SODIUM CHLORIDE, POTASSIUM CHLORIDE, SODIUM LACTATE AND CALCIUM CHLORIDE 1000 ML: 600; 310; 30; 20 INJECTION, SOLUTION INTRAVENOUS at 17:52

## 2020-10-18 RX ADMIN — SODIUM CHLORIDE 2.5 MILLION UNITS: 9 INJECTION, SOLUTION INTRAVENOUS at 15:00

## 2020-10-18 RX ADMIN — ROPIVACAINE HYDROCHLORIDE: 2 INJECTION, SOLUTION EPIDURAL; INFILTRATION; PERINEURAL at 18:08

## 2020-10-18 RX ADMIN — SODIUM CHLORIDE, POTASSIUM CHLORIDE, SODIUM LACTATE AND CALCIUM CHLORIDE 300 ML: 600; 310; 30; 20 INJECTION, SOLUTION INTRAVENOUS at 22:45

## 2020-10-18 RX ADMIN — FENTANYL CITRATE 100 MCG: 50 INJECTION, SOLUTION INTRAMUSCULAR; INTRAVENOUS at 17:50

## 2020-10-18 RX ADMIN — ROPIVACAINE HYDROCHLORIDE: 2 INJECTION, SOLUTION EPIDURAL; INFILTRATION at 18:08

## 2020-10-18 RX ADMIN — BUPIVACAINE HYDROCHLORIDE 6 ML: 2.5 INJECTION, SOLUTION EPIDURAL; INFILTRATION; INTRACAUDAL; PERINEURAL at 18:54

## 2020-10-18 RX ADMIN — SODIUM CHLORIDE 2.5 MILLION UNITS: 9 INJECTION, SOLUTION INTRAVENOUS at 22:59

## 2020-10-18 RX ADMIN — BUPIVACAINE HYDROCHLORIDE 5 ML: 2.5 INJECTION, SOLUTION EPIDURAL; INFILTRATION; INTRACAUDAL; PERINEURAL at 21:39

## 2020-10-18 RX ADMIN — BUPIVACAINE HYDROCHLORIDE 5 ML: 2.5 INJECTION, SOLUTION EPIDURAL; INFILTRATION; INTRACAUDAL; PERINEURAL at 22:28

## 2020-10-18 RX ADMIN — BUPIVACAINE HYDROCHLORIDE 5 ML: 2.5 INJECTION, SOLUTION EPIDURAL; INFILTRATION; INTRACAUDAL; PERINEURAL at 18:08

## 2020-10-18 RX ADMIN — FENTANYL CITRATE 100 MCG: 50 INJECTION, SOLUTION INTRAMUSCULAR; INTRAVENOUS at 18:08

## 2020-10-18 RX ADMIN — SODIUM CHLORIDE, SODIUM LACTATE, POTASSIUM CHLORIDE, CALCIUM CHLORIDE AND DEXTROSE MONOHYDRATE: 5; 600; 310; 30; 20 INJECTION, SOLUTION INTRAVENOUS at 21:30

## 2020-10-18 RX ADMIN — SODIUM CHLORIDE 2.5 MILLION UNITS: 9 INJECTION, SOLUTION INTRAVENOUS at 19:18

## 2020-10-18 RX ADMIN — SODIUM CHLORIDE, POTASSIUM CHLORIDE, SODIUM LACTATE AND CALCIUM CHLORIDE: 600; 310; 30; 20 INJECTION, SOLUTION INTRAVENOUS at 10:42

## 2020-10-18 RX ADMIN — OXYTOCIN 2 MILLI-UNITS/MIN: 10 INJECTION, SOLUTION INTRAMUSCULAR; INTRAVENOUS at 11:48

## 2020-10-18 RX ADMIN — SODIUM CHLORIDE 5 MILLION UNITS: 900 INJECTION INTRAVENOUS at 10:30

## 2020-10-18 ASSESSMENT — LIFESTYLE VARIABLES
AVERAGE NUMBER OF DAYS PER WEEK YOU HAVE A DRINK CONTAINING ALCOHOL: 0
HAVE PEOPLE ANNOYED YOU BY CRITICIZING YOUR DRINKING: NO
ALCOHOL_USE: NO
TOTAL SCORE: 0
EVER HAD A DRINK FIRST THING IN THE MORNING TO STEADY YOUR NERVES TO GET RID OF A HANGOVER: NO
DOES PATIENT WANT TO STOP DRINKING: NO
EVER FELT BAD OR GUILTY ABOUT YOUR DRINKING: NO
CONSUMPTION TOTAL: NEGATIVE
ON A TYPICAL DAY WHEN YOU DRINK ALCOHOL HOW MANY DRINKS DO YOU HAVE: 0
TOTAL SCORE: 0
HAVE YOU EVER FELT YOU SHOULD CUT DOWN ON YOUR DRINKING: NO
EVER_SMOKED: NEVER
HOW MANY TIMES IN THE PAST YEAR HAVE YOU HAD 5 OR MORE DRINKS IN A DAY: 0
TOTAL SCORE: 0

## 2020-10-18 ASSESSMENT — PATIENT HEALTH QUESTIONNAIRE - PHQ9
SUM OF ALL RESPONSES TO PHQ9 QUESTIONS 1 AND 2: 0
1. LITTLE INTEREST OR PLEASURE IN DOING THINGS: NOT AT ALL
2. FEELING DOWN, DEPRESSED, IRRITABLE, OR HOPELESS: NOT AT ALL
SUM OF ALL RESPONSES TO PHQ9 QUESTIONS 1 AND 2: 0
1. LITTLE INTEREST OR PLEASURE IN DOING THINGS: NOT AT ALL
2. FEELING DOWN, DEPRESSED, IRRITABLE, OR HOPELESS: NOT AT ALL

## 2020-10-18 ASSESSMENT — FIBROSIS 4 INDEX: FIB4 SCORE: 0.48

## 2020-10-18 ASSESSMENT — PAIN DESCRIPTION - PAIN TYPE: TYPE: ACUTE PAIN

## 2020-10-18 NOTE — H&P
DATE OF ADMISSION:  10/18/2020    HISTORY OF PRESENT ILLNESS:  Patient is a 34-year-old  1, para 0, EDC   of 2020, makes her 36 and 4/7th weeks' gestation, presents to labor and   delivery with gross rupture of membranes, clear fluid, which started at 4:45   earlier this morning.  She reported good fetal movement, no bleeding.  She had   an exam done by the nurse in triage, which revealed that she was grossly   ruptured, positive pooling, fern was positive and a vaginal exam was 1 cm, 60%   effaced, -2 station.  Baby was vertex.  She is group B strep positive.  She   has had otherwise uneventful prenatal course.  She had a low risk NIPT testing   and normal MSAFP.  Her fetal survey was normal.  She had a normal 32-week   ultrasound.  She has received her Tdap and her flu shot already.    PAST MEDICAL HISTORY:  Noncontributory.    PAST SURGICAL HISTORY:  None.    ALLERGIES:  No known drug allergies.    OBSTETRIC HISTORY:  G1, P0.    GYNECOLOGIC HISTORY:  Noncontributory.  No history of abnormal paps or STDs.    No history of herpes simplex virus.    SOCIAL HISTORY:  No alcohol, tobacco or history of drug use.    CURRENT MEDICATIONS:  Include only prenatal vitamins.    PHYSICAL EXAMINATION:  VITAL SIGNS:  Stable.  She is afebrile.  CARDIOVASCULAR:  Regular rate and rhythm.  CHEST:  Clear to auscultation bilaterally.  ABDOMEN:  Gravid, nontender, nondistended.  Normal bowel sounds.  EXTREMITIES:  No cyanosis, clubbing or edema.  PELVIC:  Sterile vaginal exam, again she was grossly ruptured and 1 cm dilated   upon arrival.  Fetal heart rate tracing category 1, reactive, reassuring,   good variability, no decelerations.    LABORATORY DATA:  She is O positive, rubella is immune, RPR nonreactive,   hepatitis B surface antigen negative.  HIV is nonreactive.  Group B strep is   positive.  Her 1-hour glucose was normal.    ASSESSMENT AND PLAN:  A 34-year-old female  1, para 0 at 36 and 4/7   weeks'  gestation.  1.   premature rupture of membranes.  We will move towards delivery at   this time.  She is not a steroid or tocolytic candidate.  Group B   streptococcus is positive, so we will treat with penicillin.  Patient will be   admitted.  She may have epidural anesthesia if and when she desires and we   will augment with Pitocin if needed.  Fetal tracings overall reassuring.    Estimated fetal weight 7 pounds.  2.  Advanced maternal age with normal testing and screening this pregnancy.       ____________________________________     MD LONNIE FLORES / FRANCISCO    DD:  10/18/2020 15:39:12  DT:  10/18/2020 16:51:56    D#:  [id number]  Job#:  [id number]

## 2020-10-18 NOTE — PROGRESS NOTES
34 y.o.  EDC 20 EGA 36.4 here to Room 215, with FOB Sukumar c/o SROM clear 0445. Pt reports positive movement, denies vaginal bleeding. SSE, grossly ruptured, positive pooling in vaginal vault. Olga Lidia herman. SVE /-2/vretex.

## 2020-10-19 LAB
ERYTHROCYTE [DISTWIDTH] IN BLOOD BY AUTOMATED COUNT: 38.9 FL (ref 35.9–50)
HCT VFR BLD AUTO: 34.9 % (ref 37–47)
HGB BLD-MCNC: 12 G/DL (ref 12–16)
MCH RBC QN AUTO: 29.5 PG (ref 27–33)
MCHC RBC AUTO-ENTMCNC: 34.4 G/DL (ref 33.6–35)
MCV RBC AUTO: 85.7 FL (ref 81.4–97.8)
PLATELET # BLD AUTO: 267 K/UL (ref 164–446)
PMV BLD AUTO: 10.2 FL (ref 9–12.9)
RBC # BLD AUTO: 4.07 M/UL (ref 4.2–5.4)
WBC # BLD AUTO: 14.3 K/UL (ref 4.8–10.8)

## 2020-10-19 PROCEDURE — 700102 HCHG RX REV CODE 250 W/ 637 OVERRIDE(OP): Performed by: OBSTETRICS & GYNECOLOGY

## 2020-10-19 PROCEDURE — 10H07YZ INSERTION OF OTHER DEVICE INTO PRODUCTS OF CONCEPTION, VIA NATURAL OR ARTIFICIAL OPENING: ICD-10-PCS | Performed by: OBSTETRICS & GYNECOLOGY

## 2020-10-19 PROCEDURE — 59409 OBSTETRICAL CARE: CPT

## 2020-10-19 PROCEDURE — 304965 HCHG RECOVERY SERVICES

## 2020-10-19 PROCEDURE — 85027 COMPLETE CBC AUTOMATED: CPT

## 2020-10-19 PROCEDURE — 770002 HCHG ROOM/CARE - OB PRIVATE (112)

## 2020-10-19 PROCEDURE — 0KQM0ZZ REPAIR PERINEUM MUSCLE, OPEN APPROACH: ICD-10-PCS | Performed by: OBSTETRICS & GYNECOLOGY

## 2020-10-19 PROCEDURE — 0UQMXZZ REPAIR VULVA, EXTERNAL APPROACH: ICD-10-PCS | Performed by: OBSTETRICS & GYNECOLOGY

## 2020-10-19 PROCEDURE — 3E0E77Z INTRODUCTION OF ELECTROLYTIC AND WATER BALANCE SUBSTANCE INTO PRODUCTS OF CONCEPTION, VIA NATURAL OR ARTIFICIAL OPENING: ICD-10-PCS | Performed by: OBSTETRICS & GYNECOLOGY

## 2020-10-19 PROCEDURE — 700111 HCHG RX REV CODE 636 W/ 250 OVERRIDE (IP): Performed by: OBSTETRICS & GYNECOLOGY

## 2020-10-19 PROCEDURE — 36415 COLL VENOUS BLD VENIPUNCTURE: CPT

## 2020-10-19 PROCEDURE — 303615 HCHG EPIDURAL/SPINAL ANESTHESIA FOR LABOR

## 2020-10-19 PROCEDURE — A9270 NON-COVERED ITEM OR SERVICE: HCPCS | Performed by: OBSTETRICS & GYNECOLOGY

## 2020-10-19 RX ORDER — ONDANSETRON 2 MG/ML
4 INJECTION INTRAMUSCULAR; INTRAVENOUS EVERY 6 HOURS PRN
Status: DISCONTINUED | OUTPATIENT
Start: 2020-10-19 | End: 2020-10-21 | Stop reason: HOSPADM

## 2020-10-19 RX ORDER — VITAMIN A ACETATE, BETA CAROTENE, ASCORBIC ACID, CHOLECALCIFEROL, .ALPHA.-TOCOPHEROL ACETATE, DL-, THIAMINE MONONITRATE, RIBOFLAVIN, NIACINAMIDE, PYRIDOXINE HYDROCHLORIDE, FOLIC ACID, CYANOCOBALAMIN, CALCIUM CARBONATE, FERROUS FUMARATE, ZINC OXIDE, CUPRIC OXIDE 3080; 12; 120; 400; 1; 1.84; 3; 20; 22; 920; 25; 200; 27; 10; 2 [IU]/1; UG/1; MG/1; [IU]/1; MG/1; MG/1; MG/1; MG/1; MG/1; [IU]/1; MG/1; MG/1; MG/1; MG/1; MG/1
1 TABLET, FILM COATED ORAL
Status: DISCONTINUED | OUTPATIENT
Start: 2020-10-19 | End: 2020-10-21 | Stop reason: HOSPADM

## 2020-10-19 RX ORDER — OXYCODONE AND ACETAMINOPHEN 10; 325 MG/1; MG/1
1 TABLET ORAL EVERY 4 HOURS PRN
Status: DISCONTINUED | OUTPATIENT
Start: 2020-10-19 | End: 2020-10-21 | Stop reason: HOSPADM

## 2020-10-19 RX ORDER — IBUPROFEN 600 MG/1
600 TABLET ORAL EVERY 6 HOURS PRN
Status: DISCONTINUED | OUTPATIENT
Start: 2020-10-19 | End: 2020-10-21 | Stop reason: HOSPADM

## 2020-10-19 RX ORDER — OXYCODONE HYDROCHLORIDE AND ACETAMINOPHEN 5; 325 MG/1; MG/1
1 TABLET ORAL EVERY 4 HOURS PRN
Status: DISCONTINUED | OUTPATIENT
Start: 2020-10-19 | End: 2020-10-21 | Stop reason: HOSPADM

## 2020-10-19 RX ORDER — SODIUM CHLORIDE, SODIUM LACTATE, POTASSIUM CHLORIDE, CALCIUM CHLORIDE 600; 310; 30; 20 MG/100ML; MG/100ML; MG/100ML; MG/100ML
INJECTION, SOLUTION INTRAVENOUS PRN
Status: DISCONTINUED | OUTPATIENT
Start: 2020-10-19 | End: 2020-10-21 | Stop reason: HOSPADM

## 2020-10-19 RX ORDER — MISOPROSTOL 200 UG/1
800 TABLET ORAL
Status: DISCONTINUED | OUTPATIENT
Start: 2020-10-19 | End: 2020-10-21 | Stop reason: HOSPADM

## 2020-10-19 RX ORDER — ONDANSETRON 4 MG/1
4 TABLET, ORALLY DISINTEGRATING ORAL EVERY 6 HOURS PRN
Status: DISCONTINUED | OUTPATIENT
Start: 2020-10-19 | End: 2020-10-21 | Stop reason: HOSPADM

## 2020-10-19 RX ORDER — DOCUSATE SODIUM 100 MG/1
100 CAPSULE, LIQUID FILLED ORAL 2 TIMES DAILY PRN
Status: DISCONTINUED | OUTPATIENT
Start: 2020-10-19 | End: 2020-10-21 | Stop reason: HOSPADM

## 2020-10-19 RX ADMIN — PRENATAL WITH FERROUS FUM AND FOLIC ACID 1 TABLET: 3080; 920; 120; 400; 22; 1.84; 3; 20; 10; 1; 12; 200; 27; 25; 2 TABLET ORAL at 07:58

## 2020-10-19 RX ADMIN — OXYTOCIN 125 ML/HR: 10 INJECTION, SOLUTION INTRAMUSCULAR; INTRAVENOUS at 02:20

## 2020-10-19 RX ADMIN — IBUPROFEN 600 MG: 600 TABLET, FILM COATED ORAL at 22:38

## 2020-10-19 RX ADMIN — IBUPROFEN 600 MG: 600 TABLET, FILM COATED ORAL at 16:27

## 2020-10-19 RX ADMIN — DOCUSATE SODIUM 100 MG: 100 CAPSULE, LIQUID FILLED ORAL at 16:27

## 2020-10-19 RX ADMIN — IBUPROFEN 600 MG: 600 TABLET, FILM COATED ORAL at 06:15

## 2020-10-19 RX ADMIN — OXYTOCIN 2000 ML/HR: 10 INJECTION, SOLUTION INTRAMUSCULAR; INTRAVENOUS at 01:22

## 2020-10-19 ASSESSMENT — PAIN DESCRIPTION - PAIN TYPE
TYPE: ACUTE PAIN

## 2020-10-19 ASSESSMENT — EDINBURGH POSTNATAL DEPRESSION SCALE (EPDS)
I HAVE BEEN SO UNHAPPY THAT I HAVE HAD DIFFICULTY SLEEPING: NOT VERY OFTEN
I HAVE FELT SAD OR MISERABLE: NO, NOT AT ALL
I HAVE BEEN ANXIOUS OR WORRIED FOR NO GOOD REASON: HARDLY EVER
I HAVE BEEN SO UNHAPPY THAT I HAVE BEEN CRYING: NO, NEVER
I HAVE FELT SCARED OR PANICKY FOR NO GOOD REASON: NO, NOT AT ALL
THE THOUGHT OF HARMING MYSELF HAS OCCURRED TO ME: NEVER
THINGS HAVE BEEN GETTING ON TOP OF ME: NO, I HAVE BEEN COPING AS WELL AS EVER
I HAVE BLAMED MYSELF UNNECESSARILY WHEN THINGS WENT WRONG: NOT VERY OFTEN
I HAVE BEEN ABLE TO LAUGH AND SEE THE FUNNY SIDE OF THINGS: AS MUCH AS I ALWAYS COULD
I HAVE LOOKED FORWARD WITH ENJOYMENT TO THINGS: AS MUCH AS I EVER DID

## 2020-10-19 ASSESSMENT — PAIN SCALES - GENERAL: PAIN_LEVEL: 0

## 2020-10-19 NOTE — PROGRESS NOTES
Assumed patient care. Took report from Radha MAE&DORIS RN. Assessed patient, VS stable and within defined parameters, fundus firm at U, lochia scant rubra. No pain/redness/swelling in calves.  Patient reports pain as 3/10 on pain scale. Oriented patient to the post partum unit including room features, scheduled medications, welcome letter, and the post partum depression scale. Educated patient on room safety and infant safety. Patient understands and verbalizes safe infant sleeping practices. Call light within reach. Will continue to monitor patient's vitals.     Educated parents on glucose protocol for infant being LPI.

## 2020-10-19 NOTE — PROGRESS NOTES
Assessment complete. Discussed POC and answered all questions.     1030- instructed patient in the use of the electric breast pump and updated patient about feeding plan to include latching, pumping, and feeding back pumped milk.

## 2020-10-19 NOTE — PROGRESS NOTES
Male  vtx  Nuchal x 1 loose  apgars 8/9  Weight pending  Intact placenta, 3vc  2nd degree lac midline and left labial  Ebl 350cc  No complications   see dictation.

## 2020-10-19 NOTE — ANESTHESIA PROCEDURE NOTES
Epidural Block    Date/Time: 10/18/2020 6:01 PM  Performed by: Jossue Holbrook M.D.  Authorized by: Jossue Holbrook M.D.     Patient Location:  OB  Start Time:  10/18/2020 6:01 PM  End Time:  10/18/2020 6:08 PM  Reason for Block: labor analgesia    patient identified, IV checked, site marked, risks and benefits discussed, surgical consent, monitors and equipment checked, pre-op evaluation and timeout performed    Patient Position:  Sitting  Prep: ChloraPrep, patient draped and sterile technique    Monitoring:  Blood pressure, continuous pulse oximetry and heart rate  Approach:  Midline  Location:  L3-L4  Injection Technique:  OSMAN saline  Skin infiltration:  Lidocaine  Strength:  1%  Dose:  3ml  Needle Type:  Tuohy  Needle Gauge:  17 G  Needle Length:  3.5 in  Loss of resistance::  4  Catheter Size:  19 G  Catheter at Skin Depth:  7  Test Dose Result:  Negative

## 2020-10-19 NOTE — LACTATION NOTE
This note was copied from a baby's chart.  Spoon fed colostrum to infant, very sleepy. Placed in upright skin to skin on mothers chest afterwards. Late  Infant handout provided.

## 2020-10-19 NOTE — PROGRESS NOTES
L&D Progress Note    Name:   Mini Howe   Date/Time:  10/18/2020 11:07 PM  Gestational Age:  36w4d  Admit Date:   10/18/2020  Admitting Dx:   Pregnancy  Labor and delivery indication for care or intervention    Subjective:  Epidural was not working anymore - had to get it replaced.   Some deep variables present and occasional late appearing.   Objective:   Vitals:    10/18/20 1836 10/18/20 1850 10/18/20 1900 10/18/20 1910   BP:  107/59 104/55 107/57   Pulse:  94 (!) 105 (!) 106   Resp:    18   Temp: 36.6 °C (97.8 °F)   36.7 °C (98 °F)   TempSrc: Temporal   Temporal   SpO2:       Weight:       Height:         SVE 7-8cm now, IUPC placed without difficulty  toco q2  FHT: cat 2, mod variability, occ variables and occ late appearing decels (non repetitive)  Ext: no calf pain alicia LE    Labs:  Recent Results (from the past 72 hour(s))   RUBELLA    Collection Time: 10/18/20 12:00 AM   Result Value Ref Range    Rubella IgG Antibody immune    HEPATITIS B SURFACE ANTIGEN    Collection Time: 10/18/20 12:00 AM   Result Value Ref Range    Hepatitis B Surface Antigen neg    OP Prenatal Panel-Blood Bank    Collection Time: 10/18/20 12:00 AM   Result Value Ref Range    Rh Grouping Only +     ABO Grouping Only O    GRP B STREP, BY PCR (SANTILLAN BROTH)    Collection Time: 10/18/20 12:00 AM    Specimen: Genital   Result Value Ref Range    Strep Gp B DNA PCR Positive    T.PALLIDUM AB EIA    Collection Time: 10/18/20 12:00 AM   Result Value Ref Range    Syphilis, Treponemal Qual immune    FERN TEST    Collection Time: 10/18/20  9:35 AM   Result Value Ref Range    Fern Test On Amniotic Fluid see below Not present   Hold Blood Bank Specimen (Not Tested)    Collection Time: 10/18/20 10:30 AM   Result Value Ref Range    Holding Tube - Bb DONE    CBC WITH DIFFERENTIAL    Collection Time: 10/18/20 10:30 AM   Result Value Ref Range    WBC 8.1 4.8 - 10.8 K/uL    RBC 4.15 (L) 4.20 - 5.40 M/uL    Hemoglobin 12.1 12.0 - 16.0 g/dL    Hematocrit  35.8 (L) 37.0 - 47.0 %    MCV 86.3 81.4 - 97.8 fL    MCH 29.2 27.0 - 33.0 pg    MCHC 33.8 33.6 - 35.0 g/dL    RDW 38.8 35.9 - 50.0 fL    Platelet Count 269 164 - 446 K/uL    MPV 10.1 9.0 - 12.9 fL    Neutrophils-Polys 66.10 44.00 - 72.00 %    Lymphocytes 22.50 22.00 - 41.00 %    Monocytes 8.90 0.00 - 13.40 %    Eosinophils 0.60 0.00 - 6.90 %    Basophils 0.40 0.00 - 1.80 %    Immature Granulocytes 1.50 (H) 0.00 - 0.90 %    Nucleated RBC 0.00 /100 WBC    Neutrophils (Absolute) 5.36 2.00 - 7.15 K/uL    Lymphs (Absolute) 1.82 1.00 - 4.80 K/uL    Monos (Absolute) 0.72 0.00 - 0.85 K/uL    Eos (Absolute) 0.05 0.00 - 0.51 K/uL    Baso (Absolute) 0.03 0.00 - 0.12 K/uL    Immature Granulocytes (abs) 0.12 (H) 0.00 - 0.11 K/uL    NRBC (Absolute) 0.00 K/uL   COVID/SARS CoV-2 PCR    Collection Time: 10/18/20 10:50 AM    Specimen: Nasopharyngeal; Respirate   Result Value Ref Range    COVID Order Status Received    SARS-COV Antigen [place]    Collection Time: 10/18/20 10:50 AM   Result Value Ref Range    SARS-CoV-2 Source Nasal Swab     SARS-COV ANTIGEN [place] NotDetected Not-Detected         Assessment: Plan    Gestational Age:   36w4d  PPROM  Epidural replaced  Will amnio-infuse 300cc now  Anticipate   GBS positive: on PCN      Anisha Carrasco M.D.

## 2020-10-19 NOTE — PROGRESS NOTES
1850 Received beside report from Rupert RN; assuming care of patient; all questions answered; patient resting in bed, still feeling right sided pain post epidural placement, denies needs at this time; vital signs in stable condition; patient educated regarding call light system; call light and patient belongings in reach; patient encouraged to call RN for any needs, wants, desires or concerns. Whiteboard updated.  Pitocin running at 4mU upon Rn entry  1910 Merchant catheter placed; patient tolerated well. Patient resting in bed, denies needs at this time, call light and patient belongings in reach, encouraged patient to call RN for any needs, wants, desires or concerns. SVE.  2130 Dr. Carrasco notified of fhr tracing, contraction pattern, intrauterine resuscitations measure performed. Patient remains in pain despite epidural, will nate Dr. Holbrook to assess.   2135 Dr. Holbrook notified patient uncomfortable despite bolus pressed and position changes, will come to bolus.   2139 Dr. Holbrook at bedside. Bolus given.   2206 Dr. Carrasco at bedside, evaluating fhr tracing, reviewing plan of care with patient and explaining IUPC placement, all questions answered. Placed IUPC. SVE 7cm.  2215 Dr. Holbrook called to bedside for epidural evaluation.   2218 Dr. Holbrook at bedside to replace an epidural; all questions answered; patient verbalized understanding and patient signed epidural consent.    2218 Time Out. All Parties Agree.   2219 Sterile Prep   2220 Local given.   2221 Epidural placed; negative test dose appreciated; patient tolerate procedure well.   2225 Epidural connected to pump.   2250 Patient states epidural working much better, expresses pain relief. Tilted to right side, sve. RN will come to reevulate, patient and significant other educated to use call system if needing RN sooner; expressed understanding.   2357 SVE.   0002 Notified Dr. Carrasco patient complete and slightly moved baby. Will call when ready for delivery.   0116  Delivery of viable male at 36 weeks + 5 days with clear fluid. Dr. Carrasco delivered, infant to mother chest dried and stimulated with warm towel. Infant in stable condition, apgars 8/9 placed to mother's chest skin to skin. Delivery bands placed on infant's ankle and wrist by transition RN, band placed on patient's wrist, and second adult band placed on Sukumar's wrist.   0122 Pitocin started, repair in progress.   0135 Placenta delivered, spontaneously and intact. Epidural off.   0141 Count correct. Fundus firm.   0440 Patient ambulated to restroom to void, patient able to void, tolerated well; pericare provided. Epidural catheter removed, dressing applied, tolerate well.   0445 Renuka RN from postpartum called for RN to wait on transferring patient to later time, patient agreed and placed back in bed. Will called back when RN about to transfer.   0500 RN able to transfer in 30 minutes to postpartum.   0540 Patient transferred to Postpartum unit with baby in arms sign significant other, patient and  both in stable condition; bedside report given to Renuka; Postpartum Unit RN assumed care; plan of care discussed; vital signs stable; cuddles and bands verified with both RNs at bedside, remaining pitocin bag programmed on pump.

## 2020-10-19 NOTE — ANESTHESIA PROCEDURE NOTES
Epidural Block    Date/Time: 10/18/2020 10:23 PM  Performed by: Jossue Holbrook M.D.  Authorized by: Jossue Holbrook M.D.     Patient Location:  OB  Start Time:  10/18/2020 10:23 PM  End Time:  10/18/2020 10:28 PM  Reason for Block: labor analgesia    patient identified, IV checked, site marked, risks and benefits discussed, surgical consent, monitors and equipment checked, pre-op evaluation and timeout performed    Patient Position:  Sitting  Prep: ChloraPrep, patient draped and sterile technique    Monitoring:  Blood pressure, continuous pulse oximetry and heart rate  Approach:  Midline  Location:  L4-L5  Injection Technique:  OSMAN saline  Skin infiltration:  Lidocaine  Strength:  1%  Dose:  3ml  Needle Type:  Tuohy  Needle Gauge:  17 G  Needle Length:  3.5 in  Loss of resistance::  4.3  Catheter Size:  19 G  Catheter at Skin Depth:  9.3  Test Dose Result:  Negative   Easy OSMAN x 1 attempt

## 2020-10-19 NOTE — ANESTHESIA TIME REPORT
Anesthesia Start and Stop Event Times     Date Time Event    10/18/2020 1747 Ready for Procedure     1757 Anesthesia Start    10/19/2020 0116 Anesthesia Stop        Responsible Staff  10/18/20 to 10/19/20    Name Role Begin End    Jossue Holbrook M.D. Anesth 1757 0116        Preop Diagnosis (Free Text):  Pre-op Diagnosis             Preop Diagnosis (Codes):    Post op Diagnosis  Pregnancy      Premium Reason  E. Weekend    Comments:

## 2020-10-19 NOTE — L&D DELIVERY NOTE
DATE OF SERVICE:  10/19/2020    PREOPERATIVE DIAGNOSES:    1.  Intrauterine pregnancy at 36 weeks' gestation.  2.   premature rupture of membranes.  3.  Group B streptococcus positive.  4.  Advanced maternal age.    POSTOPERATIVE DIAGNOSES:  1.  Intrauterine pregnancy at 36 weeks' gestation.  2.   premature rupture of membranes.  3.  Group B streptococcus positive.  4.  Advanced maternal age.    PROCEDURE:  Normal spontaneous vaginal delivery.    DELIVERING PHYSICIAN:  Anisha Carrasco MD    ANESTHESIOLOGIST:  Josseu Holbrook MD    ANESTHESIA:  Epidural, which had to be replaced twice.    COMPLICATIONS:  None.    ESTIMATED BLOOD LOSS:  350 mL.    FINDINGS:  Male infant, cephalic presentation, clear amniotic fluid.  Apgars 8   and 9, nuchal cord x1, loose, delivered through.  Intact placenta, 3-vessel   cord, secondary midline laceration and a left labial laceration repaired with   3-0 Vicryl.  Sponge, laps and needle counts were correct x3.  Patient and baby   are doing well and are skin-to-skin at this time.    PROCEDURE:  Patient arrived with spontaneous rupture of membranes at 36 weeks'   gestation.  She is group B strep positive.  She was started on penicillin.    She was augmented with Pitocin.  She received epidural anesthesia.  She got to   about 7 cm and the epidural was not working anymore despite boluses, so the   epidural was replaced.  She was internalized and eventually went to complete.    She pushed for about half an hour.  There were some deep variables with her   pushing, but always maintained good variability.  She then pushed and   delivered a male infant over a second-degree midline laceration and a left   labial laceration.  Once the anterior shoulder delivered, there was a loose   nuchal cord that was reduced and the rest of baby delivered uneventfully and   placed on maternal abdomen with awaiting delivery team nurse.  Delay for cord   clamp 30 seconds.  Cord was then clamped  x2, cut by father of the baby.  The   placenta delivered spontaneously intact, 3-vessel cord.  Second-degree   laceration midline was repaired with 3-0 Vicryl and a left labial laceration   was repaired with 3-0 Vicryl.  Sponge, laps and needle counts were correct x3.    Estimated blood loss 350 mL.  Baby's weight is still pending as they are   skin-to-skin at this time.       ____________________________________     MD LONNIE FLORES / FRANCISCO    DD:  10/19/2020 01:50:34  DT:  10/19/2020 04:36:04    D#:  [id number]  Job#:  [id number]

## 2020-10-19 NOTE — ANESTHESIA QCDR
2019 [facility] Clinical Data Registry (for Quality Improvement)     Postoperative nausea/vomiting risk protocol (Adult = 18 yrs and Pediatric 3-17 yrs)- (430 and 463)  General inhalation anesthetic (NOT TIVA) with PONV risk factors: No  Provision of anti-emetic therapy with at least 2 different classes of agents: N/A  Patient DID NOT receive anti-emetic therapy and reason is documented in Medical Record: N/A    Multimodal Pain Management- (477)  Non-emergent surgery AND patient age >= 18: No  Use of Multimodal Pain Management, two or more drugs and/or interventions, NOT including systemic opioids:   Exception: Documented allergy to multiple classes of analgesics:     Smoking Abstinence (404)  Patient is current smoker (cigarette, pipe, e-cig, marijuanna): No  Elective Surgery:   Abstinence instructions provided prior to day of surgery:   Patient abstained from smoking on day of surgery:     Pre-Op Beta-Blocker in Isolated CABG (44)  Isolated CABG AND patient age >= 18: No  Beta-blocker admin within 24 hours of surgical incision:   Exception:of medical reason(s) for not administering beta blocker within 24 hours prior to surgical incision (e.g., not  indicated,other medical reason):     PACU assessment of acute postoperative pain prior to Anesthesia Care End- Applies to Patients Age = 18- (ABG7)  Initial PACU pain score is which of the following: < 7/10  Patient unable to report pain score: N/A    Post-anesthetic transfer of care checklist/protocol to PACU/ICU- (426 and 427)  Upon conclusion of case, patient transferred to which of the following locations: PACU/Non-ICU  Use of transfer checklist/protocol: Yes  Exclusion: Service Performed in Patient Hospital Room (and thus did not require transfer): N/A  Unplanned admission to ICU related to anesthesia service up through end of PACU care- (MD51)  Unplanned admission to ICU (not initially anticipated at anesthesia start time): No

## 2020-10-20 PROCEDURE — 770002 HCHG ROOM/CARE - OB PRIVATE (112)

## 2020-10-20 PROCEDURE — 700102 HCHG RX REV CODE 250 W/ 637 OVERRIDE(OP): Performed by: OBSTETRICS & GYNECOLOGY

## 2020-10-20 PROCEDURE — A9270 NON-COVERED ITEM OR SERVICE: HCPCS | Performed by: OBSTETRICS & GYNECOLOGY

## 2020-10-20 RX ADMIN — PRENATAL WITH FERROUS FUM AND FOLIC ACID 1 TABLET: 3080; 920; 120; 400; 22; 1.84; 3; 20; 10; 1; 12; 200; 27; 25; 2 TABLET ORAL at 09:14

## 2020-10-20 RX ADMIN — IBUPROFEN 600 MG: 600 TABLET, FILM COATED ORAL at 23:29

## 2020-10-20 RX ADMIN — DOCUSATE SODIUM 100 MG: 100 CAPSULE, LIQUID FILLED ORAL at 23:29

## 2020-10-20 RX ADMIN — IBUPROFEN 600 MG: 600 TABLET, FILM COATED ORAL at 15:06

## 2020-10-20 RX ADMIN — IBUPROFEN 600 MG: 600 TABLET, FILM COATED ORAL at 05:25

## 2020-10-20 RX ADMIN — DOCUSATE SODIUM 100 MG: 100 CAPSULE, LIQUID FILLED ORAL at 05:25

## 2020-10-20 ASSESSMENT — PAIN DESCRIPTION - PAIN TYPE
TYPE: ACUTE PAIN
TYPE: ACUTE PAIN

## 2020-10-20 NOTE — PROGRESS NOTES
PPD#1 s/p , , PPROM at 36w, GBS pos  S/ feels ok, tired, working on feeding, baby not latching well    O/  Vitals:    10/19/20 0600 10/19/20 1100 10/19/20 1800 10/20/20 0600   BP: 103/58 110/69 100/65 110/75   Pulse: 80 86 79 63   Resp:    Temp: 36.8 °C (98.2 °F) 36.6 °C (97.9 °F) 37.2 °C (99 °F) 36.4 °C (97.5 °F)   TempSrc: Temporal Temporal Temporal Temporal   SpO2: 95% 95% 97% 100%   Weight:       Height:         gen-nad  Abd-soft, ff below umb  Ext- no calf tenderness    Recent Labs     10/18/20  1030 10/19/20  1115   WBC 8.1 14.3*   RBC 4.15* 4.07*   HEMOGLOBIN 12.1 12.0   HEMATOCRIT 35.8* 34.9*   MCV 86.3 85.7   MCH 29.2 29.5   RDW 38.8 38.9   PLATELETCT 269 267   MPV 10.1 10.2   NEUTSPOLYS 66.10  --    LYMPHOCYTES 22.50  --    MONOCYTES 8.90  --    EOSINOPHILS 0.60  --    BASOPHILS 0.40  --      A&P/Stable PP  Routine care  Ant d/c tomorrow

## 2020-10-20 NOTE — LACTATION NOTE
This note was copied from a baby's chart.  Suggested renting a hospital grade breast pump from the [employer]. GALA Patino, is aware of location.     Reviewed hours with parents and to take the kit home.     Suggested that an out-patient appointment in the next week or so would be a good idea, can be scheduled tomorrow prior to discharge.

## 2020-10-20 NOTE — LACTATION NOTE
This note was copied from a baby's chart.  Discussed benefits of delayed bath and skin to skin care for mother and baby.     Evaluated breast pump use to include frequency, duration, suction and speed settings as well as flange fit.   Her nipples were a little tender so we dropped suction setting to 21%.     Reinforced skin to skin time with baby and hand exrpessing after a pump. Suggested that baby may need to increase volume of feeding to 20 ml this afternoon. Syring fed several drops of expressed colostrum.

## 2020-10-20 NOTE — PROGRESS NOTES
Lactation support being given with each breastfeeding session this shift, encouraging pt to call due to LPI high risk infant. Pt using breastpump and instructed in lanolin use and nipple care, education provided on latching techniques and positions, continuing to assist as needed. Camila Mclean R.N.

## 2020-10-20 NOTE — CARE PLAN
Problem: Altered physiologic condition related to immediate post-delivery state and potential for bleeding/hemorrhage  Goal: Patient physiologically stable as evidenced by normal lochia, palpable uterine involution and vital signs within normal limits  Outcome: PROGRESSING AS EXPECTED  Intervention: Perform physical assessment and obtain vital signs on patient as directed in Intrapartum/Postpartum Standard of Care in Policy and Procedure manual  Note: Fundus firm, lochia light without clots expressed, pt performing christophe care without difficulty     Problem: Alteration in comfort related to episiotomy, vaginal repair and/or after birth pains  Goal: Patient is able to ambulate, care for self and infant  Outcome: PROGRESSING AS EXPECTED  Intervention: Administer pain meds as requested by patient and ordered by MD//CNM  Note: Pt requests ibuprofen be given around the clock as available to manage back and cramping pain. Pt reports good relief with ibuprofen and is able to ambulate and care for self and infant

## 2020-10-21 ENCOUNTER — PHARMACY VISIT (OUTPATIENT)
Dept: PHARMACY | Facility: MEDICAL CENTER | Age: 35
End: 2020-10-21
Payer: COMMERCIAL

## 2020-10-21 VITALS
HEART RATE: 74 BPM | HEIGHT: 69 IN | SYSTOLIC BLOOD PRESSURE: 100 MMHG | RESPIRATION RATE: 18 BRPM | WEIGHT: 185 LBS | DIASTOLIC BLOOD PRESSURE: 62 MMHG | OXYGEN SATURATION: 96 % | TEMPERATURE: 98 F | BODY MASS INDEX: 27.4 KG/M2

## 2020-10-21 PROCEDURE — RXMED WILLOW AMBULATORY MEDICATION CHARGE: Performed by: OBSTETRICS & GYNECOLOGY

## 2020-10-21 PROCEDURE — A9270 NON-COVERED ITEM OR SERVICE: HCPCS | Performed by: OBSTETRICS & GYNECOLOGY

## 2020-10-21 PROCEDURE — 700102 HCHG RX REV CODE 250 W/ 637 OVERRIDE(OP): Performed by: OBSTETRICS & GYNECOLOGY

## 2020-10-21 RX ORDER — IBUPROFEN 600 MG/1
600 TABLET ORAL EVERY 6 HOURS PRN
Qty: 30 TAB | Refills: 1 | Status: SHIPPED | OUTPATIENT
Start: 2020-10-21 | End: 2022-07-22

## 2020-10-21 RX ADMIN — IBUPROFEN 600 MG: 600 TABLET, FILM COATED ORAL at 11:53

## 2020-10-21 RX ADMIN — PRENATAL WITH FERROUS FUM AND FOLIC ACID 1 TABLET: 3080; 920; 120; 400; 22; 1.84; 3; 20; 10; 1; 12; 200; 27; 25; 2 TABLET ORAL at 08:57

## 2020-10-21 RX ADMIN — IBUPROFEN 600 MG: 600 TABLET, FILM COATED ORAL at 05:38

## 2020-10-21 ASSESSMENT — PAIN DESCRIPTION - PAIN TYPE
TYPE: ACUTE PAIN

## 2020-10-21 NOTE — PROGRESS NOTES
Pt assessment complete, wnl.  Pt ambulating and voiding without difficulty.  Bonding well with infant, breast feeding independently, following feeding plan for infant.  Plan of care discussed with patient for the day.  Questions answered.  FOB at bedside and supportive.  Encouraged to call with needs, call light in reach.

## 2020-10-21 NOTE — PROGRESS NOTES
1155- Discharge teaching reviewed with patient, all questions answered.  Pt has all written information on self and infant care, including medications,  screening slip and information, and follow-up instructions.    1220- Pt in apparent stable condition for discharge, showing no s/s of distress.  Pt walked out of hospital, declining w/c, with infant and FOB, escorted by CNA.

## 2020-10-21 NOTE — CARE PLAN
Problem: Potential for postpartum infection related to presence of episiotomy/vaginal tear and/or uterine contamination  Goal: Patient will be absent from signs and symptoms of infection  Outcome: PROGRESSING AS EXPECTED  Intervention: Monitor vital signs and assess patient as directed in Intrapartum/Postpartum Standard of Care in Policy and Procedure manual  Note: Pain well controlled with ibuprofen, ambulating freely in room, remains afebrile, no s/s infection noted at this time     Problem: Alteration in comfort related to episiotomy, vaginal repair and/or after birth pains  Goal: Patient verbalizes acceptable pain level  Outcome: PROGRESSING AS EXPECTED  Intervention: Assess effectiveness of pain meds within 1 hour of administration  Note: Pt reports adequate pain relief with ibuprofen, is able to ambulate in room and care for self and infant without difficulty

## 2020-10-21 NOTE — PROGRESS NOTES
Spoke with Dr. Grajeda regarding Dr. Palomares's discharge summary and follow up instructions.  This patient did not have a history of preeclampsia, therefore does not need to follow up in the office in one week for a BP check.  Dr. Grajeda instructed patient to follow up in 5-6 weeks in office for regular post partum check.

## 2020-10-21 NOTE — CARE PLAN
Problem: Altered physiologic condition related to immediate post-delivery state and potential for bleeding/hemorrhage  Goal: Patient physiologically stable as evidenced by normal lochia, palpable uterine involution and vital signs within normal limits  Outcome: PROGRESSING AS EXPECTED  Note: Fundus firm, lochia light, VSS.     Problem: Alteration in comfort related to episiotomy, vaginal repair and/or after birth pains  Goal: Patient is able to ambulate, care for self and infant  Outcome: PROGRESSING AS EXPECTED  Note: Pt ambulating and caring for self and infant independently, taking po pain medication as needed.  Will call RN this shift when she needs pain medication.

## 2020-10-21 NOTE — DISCHARGE PLANNING
Meds-to-Beds: Discharge prescription order listed below delivered to patient's bedside. RN notified. Patient counseled. Patient elected to have co-payment billed to patient account.       Mini Howe   Home Medication Instructions JEANETH:[id number]    Printed on:10/21/20 0922   Medication Information                      ibuprofen (MOTRIN) 600 MG Tab  Take 1 Tablet by mouth every 6 hours as needed (For cramping after delivery; do not take with ketorolac).               Gely Munoz, PharmD

## 2020-10-21 NOTE — LACTATION NOTE
This note was copied from a baby's chart.  Mom is a 35 y/o P1 who deivered baby boy weighing 7# 2.5 oz at 36.5 wks. Baby is latching adequately but time spent on the breast is inconsistent, maximum time at breast should be about 15 minutes until baby becomes more consistent with feedings and latch and begins to increase his stamina at breast.  FOB is supplementing according to Supplemental volumes feeding guidelines after every feeding with EXBM  and DBM. Parents will switch to formula and EXBM after discharging home.   Mom is double pumping after every feed for 15 minutes. Nipples are large and slightly tender and tight in the flange. Mother was  given 28.5 flanges for next pump session. Mom was able to pump 12 mls during LC visit. Baby is taking approx 25 mls after feeds. FOB states he was instructed by Speech not to put nipple far into baby's mouth. LC slid nipple further into baby's mouth reviewed proper nipple placement when bottle feeding and instructed parents to wait for wide gape before engaging artificial nipple deeply into baby's mouth, much like they would while breastfeeding. Baby left approx 5-6 mls of DBM in bottle and LC was able to have baby finish  off remainder of fluid. LC taught that baby should have a wide gape and flange lips around majority of nipple and then baby should engage in feeding.  Parents are using the Dr Gutierrez's bottle.  Discussed with parents normal feeding patterns with LPI. Pediatrician instructed mom to stop supplementing when she is able to pump 60 mls from both sides. LC discussed at length why they should no focus on a volume of 60 mls in order to stop supplementing but rather baby's engagement on the breast, stool and weight changes over next several days. Contentment between feedings, jaundice levels and meeting output daily goals. Patient should follow up with out patient lactation services for appointment.  Mom was given information for follow up support.

## 2020-11-11 ENCOUNTER — NON-PROVIDER VISIT (OUTPATIENT)
Dept: OBGYN | Facility: CLINIC | Age: 35
End: 2020-11-11
Payer: COMMERCIAL

## 2020-11-11 NOTE — PROGRESS NOTES
Summary: Mini has done an excellent job establishing an abundant supply and has managed breastfeeding very well. At this time she does have signs of an oversupply resulting in her discomfort as well as baby spitting up and choking while on the breast. At this time the plan is to breastfeed every 2-3 hours during the day, longer stretches at night. Pump for relief as needed. Supply will be reevaluated at the follow up appointment to determine if further action is needed to reduce supply. Follow up on 2020.    Subjective:     Mini Howe is a 34 y.o. female here for lactation care. She is here today with father of baby (Sukumar) and baby, Mikey.    Concerns: nipple pain, baby spitting up, coughing & choking on the breast, oversupply, baby born at 36.5 weeks, triple feeding      HPI:   Pertinent  history: , 36.5 weeks  Mother does not have a history of GDM, hypertension prior to pregnancy, insulin resistance, multiple gestation, PCOS and thyroid disease. Common condition(s) which may interfere with milk supply.    Mother does have advanced maternal age. Common condition(s) that may interfere in milk supply.    Breast changes in pregnancy: Yes  History of breast surgeries: No      FEEDING HISTORY:    Past breastfeeding history: First baby   Hospital course: Triple feeding   Currently 2020: Breastfeeding every 2-3 hours, attempting to offer both sides. Was pumping after every feeding, 8-12x. Last week pediatrician recommended she cut back to pumping after every other feeding, now averaging 5-6x. No longer offering bottles.   Both breasts: Yes, most feedings   Bottle feeds: 0/24h    Supplement: No longer supplementing    Nipple Shield Use: None    Breast Pumping:   Frequency: After every other feeding, 5-6x daily  Type of pump: HGP  Flange size/type: 25mm  NO pain with pumping  Yielding 6-9oz at each session    Maternal ROS:  Constitutional: No fever, chills. Feeling  well  Extremities Swelling: No extremity swelling  Gastrointestinal: Negative for nausea and vomiting  Breasts: No soreness of breasts and Soreness of nipples  Psychiatric: Managing ok  Mental Health: No mention of feeling irritable, agitated, angry, overwhelmed, apathy, exhaustion nor having sleep changes outside infant feeds/demands or appetite changes     Objective:     Infant Physical Exam:   General: This is an alert, active infant in no distress  Head: Normocephalic, atraumatic, anterior fontanelle is open soft and flat.   Eyes: Tear ducts draining well  No conjunctival infection or discharge.   Nose: Nares are patent and free of congestion  Pulmonary: No retractions, no nasal flaring or distress, Symmetrical chest expansion  Abdomen: Soft.   MSK Extremities are without abnormalities. Moves all extremities well and symmetrically.    Neuro: Normal bhargav, normal palmar grasp, rooting, vigorous suck  Skin: Intact, warm dry and pink     Infant Weight gain: WNL   Hydration: Infant is well hydrated, good capillary refill, skin pink, good turgor.     Assessment/Plan & Lactation Counseling:     Infant Weight History:   10/19/2020: 7# 2.5oz  10/22/2020: 6# 11oz (Ped)  10/30/2020: 7# 4oz (Ped)  11/11/2020: 8# 6.2oz    Infant intake at Breast: L 50mls  R Offered, not interested    Total: 50mls  Milk Transfer at this feeding:   Effective breastfeeding, baby fed shortly before appointment  Initiation of Feeding: Infant initiates  Position of Feeding:    Right: football  Left: cross cradle  Attachment Achieved: rapidly  Nipple shield: N/A   Suck Pattern at the breast: Suck burst and normal rest and Gulping  Behavior Following Observed Feeding: content and sleeping  Nipple Pain: Yes  Nipple Pain from:Nipple damage from accumulated microtrauma which lowered failure strength resulting in sudden damage     Latch: Assisted latch  Suckling/Feeding: attaches, audible swallows, baby falls asleep, baby fed effectively, baby roots,  elicits ROSALIA, intermittent swallows and rhythmic  Milk Supply Available: oversupply    BREASTFEEDING PLAN  Discussed concerns and symptoms as listed above in assessment and guidance summarized below.  Topics reviewed included:  • Maternal Mental Health: Discussed local resources.   • Sleep or lack of and discussed strategies to manage restorative sleep, although short amounts, significant to the mental health of the mother.   o Dad can start to offer 1-2 bottles during the night to allow for longer stretches of sleep.   • Breastsleeping Discussed and referred to Academy of Breastfeeding Medicine protocol on safe sleeping  • Self Care. Discussed support, rest, getting out of the house each day, walk to the mailbox or drive somewhere,  a treat at a drive thru.  • Feeding:   o Feed your baby every 1.5-3 hours during the day.   - No need to set an alarm during the night  o Offer both breast but if baby is not interested in taking the second side, there is no need to force him  • Supplement: No supplement is needed  o Can offer replacement bottles during the night as desired  o Paced Bottle Feeding Video: https://www.Dial2Do.com/watch?v=QmNEY0eHA2M  o Offer 2-3oz   • Positioning Techniques   o Suggested positions Cross cradle and Football  o Fine tune position by making sure your fingers beneath the breast as well as your bra, are out of the way of your baby's chin.  o Positioning:  Many positions shown, great sidelying at 7 minutes.   o See http://globalhealthmedia.org/portfolio-items/positions-for-breastfeeding/?hwtcqnqmgBL=75489  • Latch on Techniques for bare breast. Modify for nipple shield use  o Fine tune latch:  - By holding your baby more securely at the breast, assisting your baby to stay attached by:  - Bringing your baby to your breast, not breast to the baby  - Your baby's cheek to touch breast securely, nose tipped back  - Hold your baby firmly in place so when your baby forgets to suck and picks it  back up again your baby is in the correct spot. You will be extinguishing behavior and replacing it with a deeper latch to stimulate suck and provide satisfaction at the breast  - Your baby needs as much breast as deep in the mouth as possible to allow your nipples to heal and for you more importantly to maximize efficiency at the breast  - Latch is asymmetrical, leading with the chin, getting more underneath.  • Pumping Guidelines:  o Pump as needed, after feedings, mostly for comfort  o If offering bottle at night, pump after last feeding of the night  o Type of pump:  • Hospital grade, can switch to personal pump at anytime  • Connect with other mothers:  o Facebook:   - [place] [facility]: [email or web address]  - Well-Nourished Babies (Private group for questions and support): https://www.facebook.com/groups/667609471195966/  o Breastfeeding [place] for support not assessment: Tuesday  at 11am by [employer],  you will be sent an invitation.   - You may instead copy and paste this link:    [email or web address]    - Passcode  [id number]  - You may share this link with friends; please don't post on social media    In Conclusion:   Family present has verbalized what they can realistically do based on family dynamics, understanding a plan has to be doable to be effective and can be renegotiated at any time.    This is a complex and intimate journey. When obstacles present themselves, it takes confidence, persistence and support. You are now the focus of our Breastfeeding Medicine team; we are here to support your decisions and goals.      Follow up requires close monitoring in this time sensitive window of opportunity to establish milk supply and facilitate the learning of  breastfeeding.    Mom is encouraged to e-mail to update how the plan is working.    Pediatrician appointment: 2 month well check    Follow-up for infant weight check  and dyad breastfeeding evaluation in 6 day(s)  Please call [phone] if you have not scheduled your next appointment    A total of 75 minutes were spent face to face with more than 50% spent counseling and coordinating care as detailed in the above note.      PLEASE NOTE: Some of this note was created using voice recognition software. I have made every reasonable attempt to correct obvious errors, but I expect that there may be errors of grammar and possibly content that I did not discover prior finalizing this note.  Abby Winkler

## 2020-11-17 ENCOUNTER — OFFICE VISIT (OUTPATIENT)
Dept: OBGYN | Facility: CLINIC | Age: 35
End: 2020-11-17
Payer: COMMERCIAL

## 2020-11-17 DIAGNOSIS — O92.29 SORE NIPPLES DUE TO LACTATION: ICD-10-CM

## 2020-11-17 PROCEDURE — 99205 OFFICE O/P NEW HI 60 MIN: CPT | Performed by: NURSE PRACTITIONER

## 2020-11-17 NOTE — PROGRESS NOTES
Summary: Mom has built an abundant supply, pumping to empty before bed and dad provides the next feeding.  Mom full upon waking and needs to expres to the side baby didn't nurse. Pumping 2-3x in the day for comfort, less than 10 minutes. Freezer full. Nursing exclusively. Sore nipples persist. Today, had just fed an hour ago, but baby removed 1.2oz +0.2oz easily. Adjusted latch for depth with no pain. Discussed colic, reflux, treatment strategies, and sleep.     Subjective:     Mini Howe is a 34 y.o. female here for lactation care. She is here today with father of baby (Sukumar) and baby, Mikey.     Concerns: nipple pain, baby spitting up, coughing & choking on the breast, oversupply, baby born at 36.5 weeks,reflux, poor sleepng.     HPI:   Pertinent  history: , 36.5 weeks  Tongue release by Dr. Blake.  Mother does not have a history of GDM, hypertension prior to pregnancy, insulin resistance, multiple gestation, PCOS and thyroid disease. Common condition(s) which may interfere with milk supply.     Mother does have advanced maternal age. Common condition(s) that may interfere in milk supply.     Breast changes in pregnancy: Yes  History of breast surgeries: No       FEEDING HISTORY:    Past breastfeeding history: First baby   Hospital course: Triple feeding   Prior to  Visit on 2020: Breastfeeding every 2-3 hours, attempting to offer both sides. Was pumping after every feeding, 8-12x. Last week pediatrician recommended she cut back to pumping after every other feeding, now averaging 5-6x. No longer offering bottles.   Currently 19 Mom has built an abundant supply, pumping to empty before bed and dad provides the next feeding.  Mom full upon waking and has to pump one side. Pumping 2-3x in the day for comfort, less than 10 minutes. Freezer full. Nursing exclusively. Sore nipples persist.     Both breasts: Yes, most feedings   Bottle feeds: 1x/24h     Supplement: No longer  supplementing     Nipple Shield Use: None     Breast Pumping:   Frequency: After every other feeding, 3-4x daily  Type of pump: HGP  Flange size/type: 28.5mm from hospital  Pain with pumping    Maternal ROS:  Constitutional: No fever, chills. Feeling well  Extremities Swelling: No extremity swelling  Gastrointestinal: Negative for nausea and vomiting  Breasts: No soreness of breasts and Soreness of nipples  Psychiatric: Managing ok  Mental Health: No mention of feeling irritable, agitated, angry, overwhelmed, apathy, exhaustion nor having sleep changes outside infant feeds/demands or appetite changes       Objective:   Physical   General: no acute distress  Neurological:  Alert and oriented x3  Breasts: Soft, Plugged Duct - no evidence and Mastitis  - no S/S left breast only evaluated  Nipples: intact, abraded and across face of nipple  Psychiatric:  Normal mood and affect. Her behavior is normal. Judgment and thought content normal   Mental Health:  Did NOT exhibit sadness, crying, feeling overwhelmed, agitation or hypervigilance.       Assessment/Plan & Lactation Counseling:     Infant Weight History:   10/19/2020: 7# 2.5oz  10/22/2020: 6# 11oz (Ped)  10/30/2020: 7# 4oz (Ped)  11/11/2020: 8# 6.2oz  11/17/2020: 8#13.9oz 6 days/7.7 oz gain    Infant intake at Breast:: L not offered     R 1.2oz +0.2oz    Total 1.4oz spit up less than a tsp  Milk Transfer at this feeding:   Effective breastfeeding  Pumped: Type of Pump: Hospital grade and Platinum    Initiation of Feeding: Infant initiates  Position of Feeding:    Right: football and cross cradle  Attachment Achieved: rapidly  Nipple shield: N/A      Suck Pattern at the breast: Suck burst and normal rest  Behavior Following Observed Feeding: fussy until he spit up, pacifier used and gently rocked to sleep.  Nipple Pain from:High vacuum causing nipple strain resulting in damage and Contact forces of the tongue causing nipple strain resulting in damage     Latch:  Assisted latch  Suckling/Feeding: attaches, audible swallows, baby fed effectively, baby roots, elicits ROSALIA and rhythmic  Milk Supply Available: oversupply     Maternal Diagnosis/Problem:  Sore nipples  Premature delivery 36.5 weeks      BREASTFEEDING PLAN  Discussed concerns and symptoms as listed above in assessment and guidance summarized below.  Topics reviewed included:  • The nature of infants oral head/neck structure and function and its impact on latch and transfer of milk.   o Discussed Mechanical forces resulting in strain patterns during intrauterine life and during birth may negatively affect the oral-motor function of the  and compromise structure and function.  Joint restriction or hypo-mobility could be a logical progression following the relative lack of mobility during the last crowded months of gestation. An exceptionally flexed or rotated fetal posture may tighten myofascial structures in the neck, restricting the hyoid bone and strap muscles that support an effective swallow. More research reveals the body as an integrated whole via its major structure-maintaining and sensory organ, the fascia.  Other musculoskeletal issues, such as neck preferences, may also affect an infant's ability to nurse. These views have expanded and deepened over time.  • Observed good lift of tongue, hx hiccups, one eye closed more frequently  •  Sleep or lack of and utilizing strategies to manage restorative sleep, although short amounts, significant to the mental health of the mother.   •  Breastsleeping Discussed and referred to Academy of Breastfeeding Medicine protocol on safe sleeping but flagged due to prematurity  • Prematurity - managing the breast well, reflux and regulation are more the issues now.   • Hyperlactation (Oversupply) Managing with dropping more pumpings and decreasing duration of pumps. Will continue to address  •  Milk supply is dependent on glandular tissue development, hormonal  influences, how many times the baby removes milk and how well the breasts are emptied in a 24 hour period.Currently you have high supply so your receptors in the breast are most active. If supply falls below where you are comfortable we will increase pumping. For now with no room to freeze, we will decrease pumping  •  Feeding:   o Feed your baby every 1.5-3 hours, more often if baby acts hungry.   o Awaken baby for feeding if going over 3 hours in the day.    •  Given infants weight you may allow baby to go longer at night but that generally means shorter durations in the day.  •  When bottle-feeding, there are three primary things to consider:    o Nipple shape  o The flow rate of the nipple   o Pacing the feeding to the baby  - Nipple Shape:  • Look for a nipple that looks like a “breast at work” not a “breast at rest.”  A “breast at work” has a somewhat cone shape as the nipple and breast tissue is pulled into the baby’s mouth while feeding.  Your baby’s mouth should be able to go around the widest part of the nipple to form a wide-open gape on the bottle like that of a good latch at the breast. In contrast, a breast at rest might look more like, well, a breast: a roundish base with a long skinny nipple.  If the bottle looks like this, your baby’s lips may not be able to get around the widest part of the nipple because it is just too wide resulting in a narrow gape that would hurt on your nipple. This nipple shape may also make it difficult for your baby to make a complete seal with their lips which leads to air intake and milk spillage.  - Flow Rate of the Nipple:  • The nipple flow should be slow.  Don’t just read the label, but notice how your baby is feeding and trust what you observe.  A study done a few years ago found that “slow flow” varied widely between brands and even between nipples of the same brand.  Try several nipples until you find one that results in a rhythmic sucking pattern but not chugging  and gulping.  - Pacing the feeding:  • A slow flow nipple helps, but how you feed the baby is more important.  Good positioning can compensate for a faster flow nipple.  When bottle-feeding, the baby should control how much is consumed at a feeding.  Holding the baby in an upright position with the bottle horizontal ensures that the baby gets milk only when sucking.  Here is a nice video demonstrating this concept of paced bottle feeding,  https://www.youtube.com/watch?v=GgGNV0tDL0C  •  Supplement:   o No supplement is needed    •  Positioning Techniques for bare breast  o Suggested positions Cross cradle and Football  o Fine tune position by making sure your fingers beneath the breast as well as your bra, are out of the way of your baby's chin.  o Positioning:  Many positions shown, great sidelying at 7 minutes.   o See http://globalhealthmedia.org/portfolio-items/positions-for-breastfeeding/?fhgoqyzygHG=07272  o  Latch on Techniques for bare breast. Fine tune latch to resolve nipple soreness  - By holding your baby more securely at the breast, assisting your baby to stay attached by:  - Bringing your baby to your breast, not breast to the baby  - Your baby's cheek to touch breast securely, nose tipped back  - Hold your baby firmly in place so when your baby forgets to suck and picks it back up again your baby is in the correct spot. You will be extinguishing behavior and replacing it with a deeper latch to stimulate suck and provide satisfaction at the breast  - Your baby needs as much breast as deep in the mouth as possible to allow your nipples to heal and for you more importantly to maximize efficiency at the breast  • Latch is asymmetrical, leading with the chin, getting more underneath.    o  Pumping Guidelines:  o Type of pump:  - Hospital grade  - http://www.[employer].ioeda.com/healthcare-professionals/videos/[place]-platinum-with-hygienikit-video  o Order personal pump [employer] and then may return rental  o Flange  Fit: Freely moving nipple in the tunnel with some movement of the areola.  • Suction to COMFORT  • Nipple care:  • May apply breastmilk  • Moist-oily ointment after feeding/pumping, ie Lanolin nipple butter, coconut or olive oil, if desired/needed 2-3 times/day until nipples are healed  • You do not need to wash this off before pumping or feeding the baby  • You may want to remove baby from breast when active swallowing stops to avoid prolonged nipple compression  Soft shells recommended     •  Connect with other mothers:  o Facebook:   - [place] [facility]: [email or web address].[email or web address]/  - Well-Nourished Babies (Private group for questions and support): https://www.facebook.com/groups/993151477834032/  o Breastfeeding [place] for support not assessment: Tuesday  at 11am by [place],  you will be sent an invitation.   - You may instead copy and paste this link:    [email or web address]    - Passcode  [id number]  - You may share this link with friends; please don't post on social media    Follow up requires close monitoring in this time sensitive window of opportunity to address milk oversupply and facilitate the learning of  breastfeeding.    Mom is encouraged to e-mail to update how the plan is working.  Pediatrician appointment:  2 month [facility]  Follow-up for infant weight check and dyad breastfeeding evaluation in 10-14  day(s)  Please call [phone] if you have not scheduled your next appointment    A total of 60 minutes, this does not include infant assessment time, were spent face to face with more than 50% spent counseling and coordinating care as detailed in the above note.     PLEASE NOTE: Some of this note was created using voice recognition software. I have made every reasonable attempt to correct obvious errors, but I expect that there may be errors of grammar and possibly content that I did not discover prior finalizing this note.  Miguel  ALFREDO Lundberg.

## 2021-04-16 ENCOUNTER — NON-PROVIDER VISIT (OUTPATIENT)
Dept: OBGYN | Facility: CLINIC | Age: 36
End: 2021-04-16
Payer: COMMERCIAL

## 2021-04-16 NOTE — PROGRESS NOTES
Summary: Mini has been feeding an average of 8x throughout the day, baby sleeping 6-8 hours at night. Typically feeding on one breast at each feeding for 5-10 minutes. Occasionally feeding for 20-30 minutes on one side. Mother stays home with him so not pumping or giving bottles. Started antibiotics for mastitis 2 days ago, feeling better.  Plan: Continue feeding 8x every 24 hours. Ok for baby to sleep 6-8 hours at night as long as feedings are made up throughout the day. If going longer than 10 minutes on one breast, offer the second side. It is ok if he doesn't take the second breast. Will update with weight after appointment with pediatrician on 2021.   Follow Up: As needed.     Subjective:     Mini Howe is a 35 y.o. female here for lactation care. She is here today with father of baby (Sukumar) and baby, Mikey.     Concerns: mastitis, weight check, baby sleeping longer stretches at night.     HPI:   Pertinent  history: , 36.5 weeks  Tongue release by Dr. Blake.  Mother does not have a history of GDM, hypertension prior to pregnancy, insulin resistance, multiple gestation, PCOS and thyroid disease. Common condition(s) which may interfere with milk supply.     Mother does have advanced maternal age. Common condition(s) that may interfere in milk supply.     Breast changes in pregnancy: Yes  History of breast surgeries: No       FEEDING HISTORY:    Past breastfeeding history: First baby   Hospital course: Triple feeding   Prior to  consultation on 2020: Breastfeeding every 2-3 hours, attempting to offer both sides. Was pumping after every feeding, 8-12x. Last week pediatrician recommended she cut back to pumping after every other feeding, now averaging 5-6x. No longer offering bottles.   Prior to  consultation 2020: Mom has built an abundant supply, pumping to empty before bed and dad provides the next feeding.  Mom full upon waking and has to pump one side. Pumping  2-3x in the day for comfort, less than 10 minutes. Freezer full. Nursing exclusively. Sore nipples persist.   Currently 4/16/2020: Feeding an average of 8x throughout the day, baby sleeping 6-8 hours at night. Typically feeding on one breast at each feeding for 5-10 minutes. Occasionally feeding for 20-30 minutes on one side. Mother stays home with him so not pumping or giving bottles. Started antibiotics for mastitis 2 days ago, feeling better.  Both breasts: Yes, alternating at each feeding   Bottle feeds: 0     Supplement: None     Nipple Shield Use: None     Breast Pumping:   Frequency: Occasional on infected breast to increase flow    Maternal ROS:  Constitutional: No fever, chills. Feeling well  Extremities Swelling: No extremity swelling  Gastrointestinal: Negative for nausea and vomiting  Breasts: No soreness of breasts and Soreness of nipples  Psychiatric: Managing ok  Mental Health: No mention of feeling irritable, agitated, angry, overwhelmed, apathy, exhaustion nor having sleep changes outside infant feeds/demands or appetite changes     Objective:     Physical   General: no acute distress  Neurological:  Alert and oriented x3  Breasts: Soft, Plugged Duct - no evidence and Mastitis  - no S/S. Started antibiotics for mastitis 2 days ago, feeling better.   Nipples: intact, abraded and across face of nipple  Psychiatric:  Normal mood and affect. Her behavior is normal. Judgment and thought content normal   Mental Health:  Did NOT exhibit sadness, crying, feeling overwhelmed, agitation or hypervigilance.       Assessment/Plan & Lactation Counseling:     Infant Weight History:   10/19/2020: 7# 2.5oz  10/22/2020: 6# 11oz (Ped)  10/30/2020: 7# 4oz (Ped)  11/11/2020: 8# 6.2oz  11/17/2020: 8#13.9oz 6 days/7.7 oz gain  02/26/2021: 15# 7oz (Ped)  04/16/2021: 16# 4.2oz    Infant intake at Breast: L not offered     R 0mls   Total: 0mls  Milk Transfer at this feeding:    Ineffective breastfeeding, baby very distracted  and not interested in feeding    Milk Supply Available: Normal        BREASTFEEDING PLAN  Discussed concerns and symptoms as listed above in assessment and guidance summarized below.  Topics reviewed included:  • Milk supply: Healthy. To maintain feedings should stay consistent with 8x every 24 hours. If sleeping longer at night, make up feedings during the day.     • Feeding:   o 8x every 24 hours  o If feeding for longer than 10 minutes on the breast, offer the second side, it's ok if he does not take it.      • Supplement:   o No supplement is needed    • Connect with other mothers:  o Facebook:   - [place] [facility]: [email or web address]  - Well[facility]ourished Babies (Private group for questions and support): https://www.facebook.com/groups/729498336452202/  o Breastfeeding [place] for support not assessment: Tuesday  at 11am by [employer],  you will be sent an invitation.   - You may instead copy and paste this link:    [email or web address]    - Passcode  [id number]  - You may share this link with friends; please don't post on social media    Pediatrician appointment:  6 month [facility] on April 26, 2021    Follow-up for infant weight check and dyad breastfeeding evaluation as needed.  Please call [phone] if you have not scheduled your next appointment    A total of 65 minutes were spent face to face with more than 50% spent counseling and coordinating care as detailed in the above note.     PLEASE NOTE: Some of this note was created using voice recognition software. I have made every reasonable attempt to correct obvious errors, but I expect that there may be errors of grammar and possibly content that I did not discover prior finalizing this note.  Abby Winkler

## 2021-06-22 ENCOUNTER — APPOINTMENT (RX ONLY)
Dept: URBAN - METROPOLITAN AREA CLINIC 4 | Facility: CLINIC | Age: 36
Setting detail: DERMATOLOGY
End: 2021-06-22

## 2021-06-22 DIAGNOSIS — Z71.89 OTHER SPECIFIED COUNSELING: ICD-10-CM

## 2021-06-22 DIAGNOSIS — L81.4 OTHER MELANIN HYPERPIGMENTATION: ICD-10-CM

## 2021-06-22 DIAGNOSIS — D22 MELANOCYTIC NEVI: ICD-10-CM

## 2021-06-22 DIAGNOSIS — D18.0 HEMANGIOMA: ICD-10-CM

## 2021-06-22 DIAGNOSIS — L82.1 OTHER SEBORRHEIC KERATOSIS: ICD-10-CM

## 2021-06-22 DIAGNOSIS — L21.8 OTHER SEBORRHEIC DERMATITIS: ICD-10-CM

## 2021-06-22 PROBLEM — D18.01 HEMANGIOMA OF SKIN AND SUBCUTANEOUS TISSUE: Status: ACTIVE | Noted: 2021-06-22

## 2021-06-22 PROBLEM — D22.9 MELANOCYTIC NEVI, UNSPECIFIED: Status: ACTIVE | Noted: 2021-06-22

## 2021-06-22 PROBLEM — D48.5 NEOPLASM OF UNCERTAIN BEHAVIOR OF SKIN: Status: ACTIVE | Noted: 2021-06-22

## 2021-06-22 PROBLEM — D23.72 OTHER BENIGN NEOPLASM OF SKIN OF LEFT LOWER LIMB, INCLUDING HIP: Status: ACTIVE | Noted: 2021-06-22

## 2021-06-22 PROCEDURE — ? SUNSCREEN RECOMMENDATIONS

## 2021-06-22 PROCEDURE — ? BIOPSY BY SHAVE METHOD

## 2021-06-22 PROCEDURE — 99213 OFFICE O/P EST LOW 20 MIN: CPT | Mod: 25

## 2021-06-22 PROCEDURE — ? COUNSELING

## 2021-06-22 PROCEDURE — 11102 TANGNTL BX SKIN SINGLE LES: CPT

## 2021-06-22 ASSESSMENT — LOCATION DETAILED DESCRIPTION DERM
LOCATION DETAILED: LEFT LATERAL TRAPEZIAL NECK
LOCATION DETAILED: RIGHT SUPERIOR PARIETAL SCALP

## 2021-06-22 ASSESSMENT — LOCATION ZONE DERM
LOCATION ZONE: SCALP
LOCATION ZONE: NECK

## 2021-06-22 ASSESSMENT — LOCATION SIMPLE DESCRIPTION DERM
LOCATION SIMPLE: POSTERIOR NECK
LOCATION SIMPLE: SCALP

## 2021-06-22 NOTE — PROCEDURE: BIOPSY BY SHAVE METHOD
Detail Level: Detailed
Depth Of Biopsy: dermis
Was A Bandage Applied: Yes
Size Of Lesion In Cm: 0
Biopsy Type: H and E
Biopsy Method: Personna blade
Anesthesia Type: 1% lidocaine with epinephrine
Anesthesia Volume In Cc: 2
Hemostasis: Electrocautery
Wound Care: Vaseline
Dressing: bandage
Destruction After The Procedure: No
Type Of Destruction Used: Curettage
Curettage Text: The wound bed was treated with curettage after the biopsy was performed.
Cryotherapy Text: The wound bed was treated with cryotherapy after the biopsy was performed.
Electrodesiccation Text: The wound bed was treated with electrodesiccation after the biopsy was performed.
Electrodesiccation And Curettage Text: The wound bed was treated with electrodesiccation and curettage after the biopsy was performed.
Silver Nitrate Text: The wound bed was treated with silver nitrate after the biopsy was performed.
Lab: [id number]
Lab Facility: 
Consent: Written consent was obtained and risks were reviewed including but not limited to scarring, infection, bleeding, scabbing, incomplete removal, nerve damage and allergy to anesthesia.
Post-Care Instructions: I reviewed with the patient in detail post-care instructions. Patient is to keep the biopsy site dry overnight, and then apply bacitracin twice daily until healed. Patient may apply hydrogen peroxide soaks to remove any crusting.
Notification Instructions: Patient will be notified of biopsy results. However, patient instructed to call the office if not contacted within 2 weeks.
Billing Type: Third-Party Bill
Information: Selecting Yes will display possible errors in your note based on the variables you have selected. This validation is only offered as a suggestion for you. PLEASE NOTE THAT THE VALIDATION TEXT WILL BE REMOVED WHEN YOU FINALIZE YOUR NOTE. IF YOU WANT TO FAX A PRELIMINARY NOTE YOU WILL NEED TO TOGGLE THIS TO 'NO' IF YOU DO NOT WANT IT IN YOUR FAXED NOTE.
Odomzo Counseling- I discussed with the patient the risks of Odomzo including but not limited to nausea, vomiting, diarrhea, constipation, weight loss, changes in the sense of taste, decreased appetite, muscle spasms, and hair loss.  The patient verbalized understanding of the proper use and possible adverse effects of Odomzo.  All of the patient's questions and concerns were addressed.

## 2021-11-22 ENCOUNTER — APPOINTMENT (RX ONLY)
Dept: URBAN - METROPOLITAN AREA CLINIC 20 | Facility: CLINIC | Age: 36
Setting detail: DERMATOLOGY
End: 2021-11-22

## 2021-11-22 DIAGNOSIS — Z41.9 ENCOUNTER FOR PROCEDURE FOR PURPOSES OTHER THAN REMEDYING HEALTH STATE, UNSPECIFIED: ICD-10-CM

## 2021-11-22 PROCEDURE — ? SCITON BBL

## 2021-11-22 ASSESSMENT — LOCATION DETAILED DESCRIPTION DERM
LOCATION DETAILED: LEFT MEDIAL FOREHEAD
LOCATION DETAILED: NASAL DORSUM
LOCATION DETAILED: LEFT CHIN
LOCATION DETAILED: RIGHT FOREHEAD
LOCATION DETAILED: RIGHT INFERIOR MEDIAL MALAR CHEEK

## 2021-11-22 ASSESSMENT — LOCATION SIMPLE DESCRIPTION DERM
LOCATION SIMPLE: RIGHT CHEEK
LOCATION SIMPLE: RIGHT FOREHEAD
LOCATION SIMPLE: NOSE
LOCATION SIMPLE: LEFT FOREHEAD
LOCATION SIMPLE: CHIN

## 2021-11-22 ASSESSMENT — LOCATION ZONE DERM
LOCATION ZONE: NOSE
LOCATION ZONE: FACE

## 2021-11-22 NOTE — PROCEDURE: SCITON BBL
Pulse Duration: 20
Spot Size: Finesse Adapter Size: 15 x 15 mm square
Cooling ?: Yes
Spot Size: Finesse Adapter Size: 11 mm square
Pulse Duration: 14
Cooling (In C): 22
Pulse Duration Units: milliseconds
Additional Comments (Optional): patient is currently breast feeding, talk with Dr Greco. she said it was ok because we are in winter.  patient talked to about hyperpigmentation possibilities
Passes: 1
Consent: Written consent obtained, risks reviewed including but not limited to crusting, scabbing, blistering, scarring, darker or lighter pigmentary change, bruising, and/or incomplete response.
Fluence (J/Cm2): 25
Fluence (J/Cm2): 10
Post-Care Instructions: I reviewed with the patient in detail post-care instructions. Patient should stay away from the sun and wear sun protection until treated areas are fully healed.
Anesthesia Volume In Cc: 0
Preprocedure Text: The treatment areas were thoroughly cleaned. Clear ultrasound gel was applied to the treatment area. The area was treated with no immediate stacking of pulses.
Pulse Duration: 30
Spot Size: Finesse Adapter Size: 7 mm round
Hide Repetition Rate?: No
Post Procedure Text: The patient tolerated the procedure well. Post care was reviewed with the patient.
Cooling (In C): 15
Detail Level: Zone
Fluence (J/Cm2): 13

## 2022-01-04 ENCOUNTER — APPOINTMENT (RX ONLY)
Dept: URBAN - METROPOLITAN AREA CLINIC 4 | Facility: CLINIC | Age: 37
Setting detail: DERMATOLOGY
End: 2022-01-04

## 2022-01-04 DIAGNOSIS — I78.8 OTHER DISEASES OF CAPILLARIES: ICD-10-CM

## 2022-01-04 DIAGNOSIS — L20.89 OTHER ATOPIC DERMATITIS: ICD-10-CM | Status: INADEQUATELY CONTROLLED

## 2022-01-04 PROBLEM — L30.9 DERMATITIS, UNSPECIFIED: Status: ACTIVE | Noted: 2022-01-04

## 2022-01-04 PROCEDURE — ? PRESCRIPTION

## 2022-01-04 PROCEDURE — 99214 OFFICE O/P EST MOD 30 MIN: CPT

## 2022-01-04 PROCEDURE — ? ADDITIONAL NOTES

## 2022-01-04 PROCEDURE — ? COUNSELING

## 2022-01-04 RX ORDER — TRIAMCINOLONE ACETONIDE 0.25 MG/G
1 OINTMENT TOPICAL BID
Qty: 15 | Refills: 3 | Status: ERX | COMMUNITY
Start: 2022-01-04

## 2022-01-04 RX ADMIN — TRIAMCINOLONE ACETONIDE 1: 0.25 OINTMENT TOPICAL at 00:00

## 2022-01-04 ASSESSMENT — LOCATION ZONE DERM
LOCATION ZONE: EYELID
LOCATION ZONE: FACE

## 2022-01-04 ASSESSMENT — LOCATION DETAILED DESCRIPTION DERM
LOCATION DETAILED: LEFT MEDIAL SUPERIOR EYELID
LOCATION DETAILED: LEFT INFERIOR CENTRAL MALAR CHEEK
LOCATION DETAILED: LEFT LATERAL INFERIOR EYELID
LOCATION DETAILED: RIGHT LATERAL INFERIOR EYELID
LOCATION DETAILED: RIGHT MEDIAL SUPERIOR EYELID

## 2022-01-04 ASSESSMENT — LOCATION SIMPLE DESCRIPTION DERM
LOCATION SIMPLE: RIGHT SUPERIOR EYELID
LOCATION SIMPLE: RIGHT INFERIOR EYELID
LOCATION SIMPLE: LEFT SUPERIOR EYELID
LOCATION SIMPLE: LEFT INFERIOR EYELID
LOCATION SIMPLE: LEFT CHEEK

## 2022-01-04 NOTE — PROCEDURE: ADDITIONAL NOTES
Render Risk Assessment In Note?: no
Additional Notes: Possible reaction to new shampoo although she used for a short period of time.  Will have her try Triamcinolone ointment.
Detail Level: Detailed

## 2022-01-04 NOTE — HPI: RASH
Is This A New Presentation, Or A Follow-Up?: Rash
Additional History: Discontinued all makeup with no improvement

## 2022-04-04 ENCOUNTER — APPOINTMENT (RX ONLY)
Dept: URBAN - METROPOLITAN AREA CLINIC 20 | Facility: CLINIC | Age: 37
Setting detail: DERMATOLOGY
End: 2022-04-04

## 2022-04-04 DIAGNOSIS — Z41.9 ENCOUNTER FOR PROCEDURE FOR PURPOSES OTHER THAN REMEDYING HEALTH STATE, UNSPECIFIED: ICD-10-CM

## 2022-04-04 PROCEDURE — ? SCITON BBL

## 2022-04-04 ASSESSMENT — LOCATION DETAILED DESCRIPTION DERM
LOCATION DETAILED: RIGHT INFERIOR CENTRAL MALAR CHEEK
LOCATION DETAILED: SUPERIOR MID FOREHEAD
LOCATION DETAILED: LEFT CHIN
LOCATION DETAILED: LEFT INFERIOR CENTRAL MALAR CHEEK
LOCATION DETAILED: NASAL DORSUM

## 2022-04-04 ASSESSMENT — LOCATION SIMPLE DESCRIPTION DERM
LOCATION SIMPLE: SUPERIOR FOREHEAD
LOCATION SIMPLE: RIGHT CHEEK
LOCATION SIMPLE: NOSE
LOCATION SIMPLE: CHIN
LOCATION SIMPLE: LEFT CHEEK

## 2022-04-04 ASSESSMENT — LOCATION ZONE DERM
LOCATION ZONE: FACE
LOCATION ZONE: NOSE

## 2022-04-04 NOTE — PROCEDURE: SCITON BBL
Post Procedure Text: The patient tolerated the procedure well. Post care was reviewed with the patient.
Cooling (In C): 15
Spot Size: Finesse Adapter Size: 15 x 15 mm square
Hide Repetition Rate?: No
Post-Care Instructions: I reviewed with the patient in detail post-care instructions. Patient should stay away from the sun and wear sun protection until treated areas are fully healed.
Repetition Rate (Hz): 1
Indication Override (Will Not Show Above If Text Entered): Vascular
Cooling (In C): 20
Fluence (J/Cm2): 13
Detail Level: Zone
Location Override (Will Not Show Above If Text Entered): spot treatment
Fluence (J/Cm2): 25
Pulse Duration Units: milliseconds
Passes: 2
Fluence (J/Cm2): 10
Price (Use Numbers Only, No Special Characters Or $): 450.00
Pulse Duration: 35
Anesthesia Volume In Cc: 0
Pulse Duration: 16
Preprocedure Text: The treatment areas were thoroughly cleaned. Clear ultrasound gel was applied to the treatment area. The area was treated with no immediate stacking of pulses.
Cooling ?: Yes
Cooling (In C): 22
Consent: Written consent obtained, risks reviewed including but not limited to crusting, scabbing, blistering, scarring, darker or lighter pigmentary change, bruising, and/or incomplete response.
Spot Size: Finesse Adapter Size: 7 mm round

## 2022-06-16 ENCOUNTER — TELEPHONE (OUTPATIENT)
Dept: HEALTH INFORMATION MANAGEMENT | Facility: OTHER | Age: 37
End: 2022-06-16

## 2022-07-14 ENCOUNTER — APPOINTMENT (OUTPATIENT)
Dept: MEDICAL GROUP | Facility: MEDICAL CENTER | Age: 37
End: 2022-07-14
Payer: COMMERCIAL

## 2022-07-21 SDOH — ECONOMIC STABILITY: FOOD INSECURITY: WITHIN THE PAST 12 MONTHS, YOU WORRIED THAT YOUR FOOD WOULD RUN OUT BEFORE YOU GOT MONEY TO BUY MORE.: NEVER TRUE

## 2022-07-21 SDOH — HEALTH STABILITY: PHYSICAL HEALTH: ON AVERAGE, HOW MANY DAYS PER WEEK DO YOU ENGAGE IN MODERATE TO STRENUOUS EXERCISE (LIKE A BRISK WALK)?: 3 DAYS

## 2022-07-21 SDOH — ECONOMIC STABILITY: HOUSING INSECURITY
IN THE LAST 12 MONTHS, WAS THERE A TIME WHEN YOU DID NOT HAVE A STEADY PLACE TO SLEEP OR SLEPT IN A SHELTER (INCLUDING NOW)?: NO

## 2022-07-21 SDOH — ECONOMIC STABILITY: HOUSING INSECURITY: IN THE LAST 12 MONTHS, HOW MANY PLACES HAVE YOU LIVED?: 1

## 2022-07-21 SDOH — HEALTH STABILITY: PHYSICAL HEALTH: ON AVERAGE, HOW MANY MINUTES DO YOU ENGAGE IN EXERCISE AT THIS LEVEL?: 40 MIN

## 2022-07-21 SDOH — ECONOMIC STABILITY: FOOD INSECURITY: WITHIN THE PAST 12 MONTHS, THE FOOD YOU BOUGHT JUST DIDN'T LAST AND YOU DIDN'T HAVE MONEY TO GET MORE.: NEVER TRUE

## 2022-07-21 SDOH — ECONOMIC STABILITY: INCOME INSECURITY: HOW HARD IS IT FOR YOU TO PAY FOR THE VERY BASICS LIKE FOOD, HOUSING, MEDICAL CARE, AND HEATING?: NOT HARD AT ALL

## 2022-07-21 SDOH — ECONOMIC STABILITY: INCOME INSECURITY: IN THE LAST 12 MONTHS, WAS THERE A TIME WHEN YOU WERE NOT ABLE TO PAY THE MORTGAGE OR RENT ON TIME?: NO

## 2022-07-21 SDOH — ECONOMIC STABILITY: TRANSPORTATION INSECURITY
IN THE PAST 12 MONTHS, HAS LACK OF RELIABLE TRANSPORTATION KEPT YOU FROM MEDICAL APPOINTMENTS, MEETINGS, WORK OR FROM GETTING THINGS NEEDED FOR DAILY LIVING?: NO

## 2022-07-21 SDOH — ECONOMIC STABILITY: TRANSPORTATION INSECURITY
IN THE PAST 12 MONTHS, HAS THE LACK OF TRANSPORTATION KEPT YOU FROM MEDICAL APPOINTMENTS OR FROM GETTING MEDICATIONS?: NO

## 2022-07-21 SDOH — HEALTH STABILITY: MENTAL HEALTH
STRESS IS WHEN SOMEONE FEELS TENSE, NERVOUS, ANXIOUS, OR CAN'T SLEEP AT NIGHT BECAUSE THEIR MIND IS TROUBLED. HOW STRESSED ARE YOU?: NOT AT ALL

## 2022-07-21 SDOH — ECONOMIC STABILITY: TRANSPORTATION INSECURITY
IN THE PAST 12 MONTHS, HAS LACK OF TRANSPORTATION KEPT YOU FROM MEETINGS, WORK, OR FROM GETTING THINGS NEEDED FOR DAILY LIVING?: NO

## 2022-07-21 ASSESSMENT — SOCIAL DETERMINANTS OF HEALTH (SDOH)
HOW OFTEN DO YOU GET TOGETHER WITH FRIENDS OR RELATIVES?: PATIENT DECLINED
HOW OFTEN DO YOU ATTENT MEETINGS OF THE CLUB OR ORGANIZATION YOU BELONG TO?: PATIENT DECLINED
WITHIN THE PAST 12 MONTHS, YOU WORRIED THAT YOUR FOOD WOULD RUN OUT BEFORE YOU GOT THE MONEY TO BUY MORE: NEVER TRUE
DO YOU BELONG TO ANY CLUBS OR ORGANIZATIONS SUCH AS CHURCH GROUPS UNIONS, FRATERNAL OR ATHLETIC GROUPS, OR SCHOOL GROUPS?: NO
IN A TYPICAL WEEK, HOW MANY TIMES DO YOU TALK ON THE PHONE WITH FAMILY, FRIENDS, OR NEIGHBORS?: PATIENT DECLINED
HOW OFTEN DO YOU ATTENT MEETINGS OF THE CLUB OR ORGANIZATION YOU BELONG TO?: PATIENT DECLINED
HOW OFTEN DO YOU ATTEND CHURCH OR RELIGIOUS SERVICES?: NEVER
HOW OFTEN DO YOU GET TOGETHER WITH FRIENDS OR RELATIVES?: PATIENT DECLINED
HOW OFTEN DO YOU HAVE A DRINK CONTAINING ALCOHOL: MONTHLY OR LESS
HOW HARD IS IT FOR YOU TO PAY FOR THE VERY BASICS LIKE FOOD, HOUSING, MEDICAL CARE, AND HEATING?: NOT HARD AT ALL
HOW OFTEN DO YOU HAVE SIX OR MORE DRINKS ON ONE OCCASION: NEVER
IN A TYPICAL WEEK, HOW MANY TIMES DO YOU TALK ON THE PHONE WITH FAMILY, FRIENDS, OR NEIGHBORS?: PATIENT DECLINED
DO YOU BELONG TO ANY CLUBS OR ORGANIZATIONS SUCH AS CHURCH GROUPS UNIONS, FRATERNAL OR ATHLETIC GROUPS, OR SCHOOL GROUPS?: NO
HOW MANY DRINKS CONTAINING ALCOHOL DO YOU HAVE ON A TYPICAL DAY WHEN YOU ARE DRINKING: 1 OR 2
HOW OFTEN DO YOU ATTEND CHURCH OR RELIGIOUS SERVICES?: NEVER

## 2022-07-21 ASSESSMENT — LIFESTYLE VARIABLES
HOW OFTEN DO YOU HAVE A DRINK CONTAINING ALCOHOL: MONTHLY OR LESS
HOW MANY STANDARD DRINKS CONTAINING ALCOHOL DO YOU HAVE ON A TYPICAL DAY: 1 OR 2
HOW OFTEN DO YOU HAVE SIX OR MORE DRINKS ON ONE OCCASION: NEVER
SKIP TO QUESTIONS 9-10: 1
AUDIT-C TOTAL SCORE: 1

## 2022-07-22 ENCOUNTER — OFFICE VISIT (OUTPATIENT)
Dept: MEDICAL GROUP | Facility: MEDICAL CENTER | Age: 37
End: 2022-07-22
Payer: COMMERCIAL

## 2022-07-22 VITALS
SYSTOLIC BLOOD PRESSURE: 124 MMHG | OXYGEN SATURATION: 99 % | TEMPERATURE: 97.9 F | WEIGHT: 146.83 LBS | HEART RATE: 65 BPM | DIASTOLIC BLOOD PRESSURE: 72 MMHG | BODY MASS INDEX: 21.68 KG/M2

## 2022-07-22 DIAGNOSIS — K90.0 CELIAC DISEASE: ICD-10-CM

## 2022-07-22 DIAGNOSIS — E78.5 DYSLIPIDEMIA: ICD-10-CM

## 2022-07-22 DIAGNOSIS — D72.819 LEUKOPENIA, UNSPECIFIED TYPE: ICD-10-CM

## 2022-07-22 DIAGNOSIS — Z13.1 SCREENING FOR DIABETES MELLITUS: ICD-10-CM

## 2022-07-22 DIAGNOSIS — Z13.21 ENCOUNTER FOR VITAMIN DEFICIENCY SCREENING: ICD-10-CM

## 2022-07-22 PROBLEM — N30.00 ACUTE CYSTITIS WITHOUT HEMATURIA: Status: RESOLVED | Noted: 2019-02-01 | Resolved: 2022-07-22

## 2022-07-22 PROBLEM — O92.29 SORE NIPPLES DUE TO LACTATION: Status: RESOLVED | Noted: 2020-11-17 | Resolved: 2022-07-22

## 2022-07-22 PROCEDURE — 99213 OFFICE O/P EST LOW 20 MIN: CPT | Performed by: FAMILY MEDICINE

## 2022-07-22 ASSESSMENT — ENCOUNTER SYMPTOMS
PALPITATIONS: 0
DIARRHEA: 0
SORE THROAT: 0
CHILLS: 0
MYALGIAS: 0
WEIGHT LOSS: 0
DIZZINESS: 0
VOMITING: 0
ABDOMINAL PAIN: 0
NAUSEA: 0
TINGLING: 0
BRUISES/BLEEDS EASILY: 0
WEAKNESS: 0
CONSTIPATION: 0
DEPRESSION: 0
BLURRED VISION: 0
COUGH: 0
DOUBLE VISION: 0
SHORTNESS OF BREATH: 0
NERVOUS/ANXIOUS: 0
FEVER: 0

## 2022-07-22 ASSESSMENT — PATIENT HEALTH QUESTIONNAIRE - PHQ9: CLINICAL INTERPRETATION OF PHQ2 SCORE: 0

## 2022-07-22 NOTE — PROGRESS NOTES
Mini Howe is a pleasant 36 y.o. female here to establish care .    HPI:    Problem   Breast Feeding Status of Mother    Patient reports that she is currently breast-feeding her 20-month-old's child.  Having difficulty with weaning.  Concerned that breast-feeding may be inhibiting her getting pregnant.  She is just started to normalize in her cycle for the last 4 months.  Is considering infertility treatments to help her get pregnant, concerned that her age is a contributing factor.     Celiac Disease    Positive history of celiac's disease, avoidance of all gluten products.     Sore Nipples Due to Lactation (Resolved)   Premature Delivery Before 37 Weeks (Resolved)   Acute Cystitis Without Hematuria (Resolved)          Current medicines (including changes today)  Current Outpatient Medications   Medication Sig Dispense Refill   • Prenatal Vit-Fe Fumarate-FA (PRENATAL PO) Take  by mouth.       No current facility-administered medications for this visit.       Past Medical/ Surgical History  She  has no past medical history on file.  She  has a past surgical history that includes foot surgery (Left).    Social History  Social History     Tobacco Use   • Smoking status: Never Smoker   • Smokeless tobacco: Never Used   Vaping Use   • Vaping Use: Never used   Substance Use Topics   • Alcohol use: Yes     Comment: once a month   • Drug use: No     Social History     Social History Narrative   • Not on file        Family History  Family History   Problem Relation Age of Onset   • No Known Problems Mother    • Cancer Father 64        brain cancer   • No Known Problems Brother    • Breast Cancer Maternal Aunt 42   • Diabetes Maternal Grandmother    • Alzheimer's Disease Paternal Grandfather      Family Status   Relation Name Status   • Mo  Alive   • Fa     • Bro  Alive   • MAunt  (Not Specified)   • MGMo  (Not Specified)   • PGFa  (Not Specified)         Review of Systems   Constitutional: Negative for  chills, fever, malaise/fatigue and weight loss.   HENT: Negative for hearing loss and sore throat.    Eyes: Negative for blurred vision and double vision.   Respiratory: Negative for cough and shortness of breath.    Cardiovascular: Negative for chest pain, palpitations and leg swelling.   Gastrointestinal: Negative for abdominal pain, constipation, diarrhea, nausea and vomiting.   Musculoskeletal: Negative for myalgias.   Skin: Negative for rash.   Neurological: Negative for dizziness, tingling and weakness.   Endo/Heme/Allergies: Does not bruise/bleed easily.   Psychiatric/Behavioral: Negative for depression. The patient is not nervous/anxious.          Objective:     /72 (BP Location: Left arm, Patient Position: Sitting, BP Cuff Size: Adult)   Pulse 65   Temp 36.6 °C (97.9 °F) (Temporal)   Wt 66.6 kg (146 lb 13.2 oz)   SpO2 99%  Body mass index is 21.68 kg/m².    Physical Exam  Constitutional:       General: She is not in acute distress.  HENT:      Head: Normocephalic and atraumatic.      Right Ear: External ear normal.      Left Ear: External ear normal.   Eyes:      General: Lids are normal.      Extraocular Movements: Extraocular movements intact.      Conjunctiva/sclera: Conjunctivae normal.      Pupils: Pupils are equal, round, and reactive to light.   Neck:      Trachea: Trachea normal.   Cardiovascular:      Rate and Rhythm: Normal rate and regular rhythm.      Heart sounds: Normal heart sounds. No murmur heard.    No friction rub. No gallop.   Pulmonary:      Effort: Pulmonary effort is normal. No accessory muscle usage.      Breath sounds: Normal breath sounds. No wheezing or rales.   Abdominal:      General: Bowel sounds are normal.      Palpations: Abdomen is soft.      Tenderness: There is no abdominal tenderness.   Musculoskeletal:      Cervical back: Normal range of motion and neck supple.      Right lower leg: No edema.      Left lower leg: No edema.   Lymphadenopathy:      Cervical: No  cervical adenopathy.   Skin:     General: Skin is warm and dry.      Findings: No rash.   Neurological:      General: No focal deficit present.      Mental Status: She is alert and oriented to person, place, and time.      GCS: GCS eye subscore is 4. GCS verbal subscore is 5. GCS motor subscore is 6.      Gait: Gait is intact.      Deep Tendon Reflexes:      Reflex Scores:       Brachioradialis reflexes are 2+ on the right side and 2+ on the left side.       Patellar reflexes are 2+ on the right side and 2+ on the left side.  Psychiatric:         Attention and Perception: Attention normal.         Mood and Affect: Affect normal.         Speech: Speech normal.        Imaging:  Recent imaging to review    Labs:  Most recent labs from 01/13/2020 reviewed with patient.  Assessment and Plan:   The following treatment plan was discussed    Problem List Items Addressed This Visit     Celiac disease    Breast feeding status of mother     Acute.  Discussed possible ways to wean her son off of breast-feeding.  Recommended to follow-up with gynecology.             Other Visit Diagnoses     Dyslipidemia        Relevant Orders    Lipid Profile    Leukopenia, unspecified type        Relevant Orders    CBC WITH DIFFERENTIAL    Screening for diabetes mellitus        Relevant Orders    Comp Metabolic Panel    ESTIMATED GFR    Encounter for vitamin deficiency screening        Relevant Orders    VITAMIN D,25 HYDROXY           Records requested.  Followup: Return in about 4 weeks (around 8/19/2022) for follow-up and labs.      Please note that this dictation was created using voice recognition software. I have made every reasonable attempt to correct obvious errors, but I expect that there are errors of grammar and possibly content that I did not discover before finalizing the note.

## 2022-07-22 NOTE — ASSESSMENT & PLAN NOTE
Acute.  Discussed possible ways to wean her son off of breast-feeding.  Recommended to follow-up with gynecology.

## 2022-08-22 ENCOUNTER — PATIENT MESSAGE (OUTPATIENT)
Dept: MEDICAL GROUP | Facility: MEDICAL CENTER | Age: 37
End: 2022-08-22
Payer: COMMERCIAL

## 2022-08-22 DIAGNOSIS — Z32.01 POSITIVE PREGNANCY TEST: ICD-10-CM

## 2022-08-24 ENCOUNTER — HOSPITAL ENCOUNTER (OUTPATIENT)
Dept: LAB | Facility: MEDICAL CENTER | Age: 37
End: 2022-08-24
Attending: FAMILY MEDICINE
Payer: COMMERCIAL

## 2022-08-24 DIAGNOSIS — Z13.1 SCREENING FOR DIABETES MELLITUS: ICD-10-CM

## 2022-08-24 DIAGNOSIS — Z32.01 POSITIVE PREGNANCY TEST: ICD-10-CM

## 2022-08-24 DIAGNOSIS — Z13.21 ENCOUNTER FOR VITAMIN DEFICIENCY SCREENING: ICD-10-CM

## 2022-08-24 DIAGNOSIS — D72.819 LEUKOPENIA, UNSPECIFIED TYPE: ICD-10-CM

## 2022-08-24 DIAGNOSIS — E78.5 DYSLIPIDEMIA: ICD-10-CM

## 2022-08-24 LAB
25(OH)D3 SERPL-MCNC: 31 NG/ML (ref 30–100)
ALBUMIN SERPL BCP-MCNC: 4.6 G/DL (ref 3.2–4.9)
ALBUMIN/GLOB SERPL: 1.9 G/DL
ALP SERPL-CCNC: 53 U/L (ref 30–99)
ALT SERPL-CCNC: 15 U/L (ref 2–50)
ANION GAP SERPL CALC-SCNC: 10 MMOL/L (ref 7–16)
AST SERPL-CCNC: 10 U/L (ref 12–45)
B-HCG SERPL-ACNC: 5358 MIU/ML (ref 0–5)
BASOPHILS # BLD AUTO: 0.7 % (ref 0–1.8)
BASOPHILS # BLD: 0.03 K/UL (ref 0–0.12)
BILIRUB SERPL-MCNC: 0.5 MG/DL (ref 0.1–1.5)
BUN SERPL-MCNC: 9 MG/DL (ref 8–22)
CALCIUM SERPL-MCNC: 9.3 MG/DL (ref 8.5–10.5)
CHLORIDE SERPL-SCNC: 103 MMOL/L (ref 96–112)
CHOLEST SERPL-MCNC: 142 MG/DL (ref 100–199)
CO2 SERPL-SCNC: 24 MMOL/L (ref 20–33)
CREAT SERPL-MCNC: 0.62 MG/DL (ref 0.5–1.4)
EOSINOPHIL # BLD AUTO: 0.12 K/UL (ref 0–0.51)
EOSINOPHIL NFR BLD: 2.9 % (ref 0–6.9)
ERYTHROCYTE [DISTWIDTH] IN BLOOD BY AUTOMATED COUNT: 40.9 FL (ref 35.9–50)
FASTING STATUS PATIENT QL REPORTED: NORMAL
GFR SERPLBLD CREATININE-BSD FMLA CKD-EPI: 117 ML/MIN/1.73 M 2
GLOBULIN SER CALC-MCNC: 2.4 G/DL (ref 1.9–3.5)
GLUCOSE SERPL-MCNC: 90 MG/DL (ref 65–99)
HCT VFR BLD AUTO: 44 % (ref 37–47)
HDLC SERPL-MCNC: 66 MG/DL
HGB BLD-MCNC: 14.9 G/DL (ref 12–16)
IMM GRANULOCYTES # BLD AUTO: 0.01 K/UL (ref 0–0.11)
IMM GRANULOCYTES NFR BLD AUTO: 0.2 % (ref 0–0.9)
LDLC SERPL CALC-MCNC: 67 MG/DL
LYMPHOCYTES # BLD AUTO: 1.5 K/UL (ref 1–4.8)
LYMPHOCYTES NFR BLD: 36.8 % (ref 22–41)
MCH RBC QN AUTO: 30.1 PG (ref 27–33)
MCHC RBC AUTO-ENTMCNC: 33.9 G/DL (ref 33.6–35)
MCV RBC AUTO: 88.9 FL (ref 81.4–97.8)
MONOCYTES # BLD AUTO: 0.36 K/UL (ref 0–0.85)
MONOCYTES NFR BLD AUTO: 8.8 % (ref 0–13.4)
NEUTROPHILS # BLD AUTO: 2.06 K/UL (ref 2–7.15)
NEUTROPHILS NFR BLD: 50.6 % (ref 44–72)
NRBC # BLD AUTO: 0 K/UL
NRBC BLD-RTO: 0 /100 WBC
PLATELET # BLD AUTO: 251 K/UL (ref 164–446)
PMV BLD AUTO: 11.2 FL (ref 9–12.9)
POTASSIUM SERPL-SCNC: 4.4 MMOL/L (ref 3.6–5.5)
PROT SERPL-MCNC: 7 G/DL (ref 6–8.2)
RBC # BLD AUTO: 4.95 M/UL (ref 4.2–5.4)
SODIUM SERPL-SCNC: 137 MMOL/L (ref 135–145)
TRIGL SERPL-MCNC: 46 MG/DL (ref 0–149)
WBC # BLD AUTO: 4.1 K/UL (ref 4.8–10.8)

## 2022-08-24 PROCEDURE — 36415 COLL VENOUS BLD VENIPUNCTURE: CPT

## 2022-08-24 PROCEDURE — 85025 COMPLETE CBC W/AUTO DIFF WBC: CPT

## 2022-08-24 PROCEDURE — 80061 LIPID PANEL: CPT

## 2022-08-24 PROCEDURE — 82306 VITAMIN D 25 HYDROXY: CPT

## 2022-08-24 PROCEDURE — 80053 COMPREHEN METABOLIC PANEL: CPT

## 2022-08-24 PROCEDURE — 84702 CHORIONIC GONADOTROPIN TEST: CPT

## 2022-08-25 NOTE — PROGRESS NOTES
Recommended the patient repeat beta-hCG 48 hours after the initial test to see for a doubling of the number.  Patient notified via [facility].

## 2022-09-06 ENCOUNTER — OFFICE VISIT (OUTPATIENT)
Dept: MEDICAL GROUP | Facility: MEDICAL CENTER | Age: 37
End: 2022-09-06
Payer: COMMERCIAL

## 2022-09-06 VITALS
HEART RATE: 87 BPM | SYSTOLIC BLOOD PRESSURE: 116 MMHG | DIASTOLIC BLOOD PRESSURE: 62 MMHG | HEIGHT: 68 IN | OXYGEN SATURATION: 98 % | TEMPERATURE: 98 F | WEIGHT: 148.4 LBS | BODY MASS INDEX: 22.49 KG/M2

## 2022-09-06 DIAGNOSIS — H92.01 RIGHT EAR PAIN: ICD-10-CM

## 2022-09-06 DIAGNOSIS — R79.89 ELEVATED SERUM HCG: ICD-10-CM

## 2022-09-06 DIAGNOSIS — D72.819 LEUKOPENIA, UNSPECIFIED TYPE: ICD-10-CM

## 2022-09-06 PROCEDURE — 99213 OFFICE O/P EST LOW 20 MIN: CPT | Performed by: FAMILY MEDICINE

## 2022-09-06 ASSESSMENT — ENCOUNTER SYMPTOMS
FEVER: 0
DEPRESSION: 0
DIZZINESS: 0
WEAKNESS: 0
COUGH: 0
SHORTNESS OF BREATH: 0
CHILLS: 0
WEIGHT LOSS: 0
NAUSEA: 1
MYALGIAS: 0
PALPITATIONS: 0
ABDOMINAL PAIN: 0

## 2022-09-06 ASSESSMENT — FIBROSIS 4 INDEX: FIB4 SCORE: 0.38

## 2022-09-06 NOTE — ASSESSMENT & PLAN NOTE
Acute.  Physical exam shows mild erythema to bilateral external ear canal without bulging, retraction, erythematous or injected TM.  Recommended Tylenol for discomfort.  If no improvement in her symptoms, to have it reevaluated

## 2022-09-06 NOTE — PROGRESS NOTES
Mini Howe is a pleasant 36 y.o. female here for   Chief Complaint   Patient presents with    Requesting Labs    Otalgia     Right ear, throbbing, x 1 day      HPI:    Recently completed labs which showed normal kidney function, liver function, basic electrolytes, and cholesterol panel.    Problem   Right Ear Pain    History of eczema to bilateral ears, noticed increasing pruritus to the right ear.  Yesterday afternoon started to notice a throbbing sensation to her right ear, concerned that she may be having some sort of infectious process.     Elevated Serum Hcg    Positive pregnancy test at home with increasing fatigue and nausea.  Tried B6 vitamin stop the nausea, states that it made her feel even more fatigued.  Does have an appointment with OB/GYN.  Pleated blood testing which showed elevation to your hCG levels.     Leukopenia    Recent completed labs with WBC of 4.1, normal differential.  No history of leukopenia in the past.  Recently found out she is pregnant.  Denies any recent illnesses or viral infections            Current Medicines (including changes today)  Current Outpatient Medications   Medication Sig Dispense Refill    Prenatal Vit-Fe Fumarate-FA (PRENATAL PO) Take  by mouth.       No current facility-administered medications for this visit.     Past Medical/ Surgical History  She  has no past medical history on file.  She  has a past surgical history that includes foot surgery (Left).    Review of Systems   Constitutional:  Positive for malaise/fatigue. Negative for chills, fever and weight loss.   Respiratory:  Negative for cough and shortness of breath.    Cardiovascular:  Negative for chest pain and palpitations.   Gastrointestinal:  Positive for nausea. Negative for abdominal pain.   Genitourinary: Negative.    Musculoskeletal:  Negative for myalgias.   Skin:  Negative for rash.   Neurological:  Negative for dizziness and weakness.   Psychiatric/Behavioral:  Negative for depression.   "      Objective:     /62 (BP Location: Left arm, Patient Position: Sitting, BP Cuff Size: Adult)   Pulse 87   Temp 36.7 °C (98 °F) (Temporal)   Ht 1.727 m (5' 8\")   Wt 67.3 kg (148 lb 6.4 oz)   SpO2 98%  Body mass index is 22.56 kg/m².    Physical Exam  Constitutional:       General: She is not in acute distress.  HENT:      Head: Normocephalic and atraumatic.      Right Ear: Tenderness (With ear manipulation) present. No middle ear effusion. Tympanic membrane is not erythematous, retracted or bulging.      Left Ear:  No middle ear effusion. Tympanic membrane is not erythematous, retracted or bulging.      Ears:      Comments: Mild erythema to the exterior canal bilateral sides  Eyes:      Conjunctiva/sclera: Conjunctivae normal.      Pupils: Pupils are equal, round, and reactive to light.   Pulmonary:      Effort: Pulmonary effort is normal. No respiratory distress.   Abdominal:      General: There is no distension.   Musculoskeletal:      Cervical back: Normal range of motion and neck supple.   Skin:     General: Skin is warm and dry.      Findings: No rash.   Neurological:      Mental Status: She is alert and oriented to person, place, and time.      Gait: Gait is intact.   Psychiatric:         Mood and Affect: Affect normal.        Imaging:  No recent imaging to review    Labs  Recent labs from 08/24/2022 reviewed with the patient.    Assessment and Plan:   The following treatment plan was discussed    Problem List Items Addressed This Visit       Right ear pain     Acute.  Physical exam shows mild erythema to bilateral external ear canal without bulging, retraction, erythematous or injected TM.  Recommended Tylenol for discomfort.  If no improvement in her symptoms, to have it reevaluated         Elevated serum hCG     New to the patient.  Recommended follow-up with OB/GYN to confirm pregnancy.         Leukopenia     New to the patient.  Recommended repeating CBC, has an appointment with OB, will " repeat CBC at that time.             Followup: Return in about 1 year (around 9/6/2023) for Annual wellness visit.        Please note that this dictation was created using voice recognition software. I have made every reasonable attempt to correct obvious errors, but I expect that there are errors of grammar and possibly content that I did not discover before finalizing the note.

## 2022-09-06 NOTE — ASSESSMENT & PLAN NOTE
New to the patient.  Recommended repeating CBC, has an appointment with OB, will repeat CBC at that time.

## 2022-09-07 ENCOUNTER — PATIENT MESSAGE (OUTPATIENT)
Dept: MEDICAL GROUP | Facility: MEDICAL CENTER | Age: 37
End: 2022-09-07
Payer: COMMERCIAL

## 2022-09-07 DIAGNOSIS — H66.90 ACUTE OTITIS MEDIA, UNSPECIFIED OTITIS MEDIA TYPE: ICD-10-CM

## 2022-09-07 RX ORDER — CEFDINIR 300 MG/1
300 CAPSULE ORAL 2 TIMES DAILY
Qty: 14 CAPSULE | Refills: 0 | Status: SHIPPED | OUTPATIENT
Start: 2022-09-07 | End: 2022-09-14

## 2022-09-10 ENCOUNTER — OFFICE VISIT (OUTPATIENT)
Dept: URGENT CARE | Facility: CLINIC | Age: 37
End: 2022-09-10
Payer: COMMERCIAL

## 2022-09-10 VITALS
DIASTOLIC BLOOD PRESSURE: 64 MMHG | SYSTOLIC BLOOD PRESSURE: 116 MMHG | HEART RATE: 74 BPM | OXYGEN SATURATION: 99 % | TEMPERATURE: 98.6 F | BODY MASS INDEX: 21.3 KG/M2 | RESPIRATION RATE: 18 BRPM | WEIGHT: 143.8 LBS | HEIGHT: 69 IN

## 2022-09-10 DIAGNOSIS — H92.01 OTALGIA OF RIGHT EAR: ICD-10-CM

## 2022-09-10 PROCEDURE — 99213 OFFICE O/P EST LOW 20 MIN: CPT | Performed by: PHYSICIAN ASSISTANT

## 2022-09-10 ASSESSMENT — ENCOUNTER SYMPTOMS
COUGH: 0
FEVER: 0
EYE DISCHARGE: 0
VOMITING: 1
EYE REDNESS: 0
DIARRHEA: 0
MYALGIAS: 0
HEADACHES: 0
NAUSEA: 1
SORE THROAT: 0

## 2022-09-10 ASSESSMENT — FIBROSIS 4 INDEX: FIB4 SCORE: 0.38

## 2022-09-11 NOTE — PROGRESS NOTES
Subjective     Mini Howe is a 36 y.o. female who presents with Follow-Up (Right ear/pain/discomfort/x5days)            Otalgia   There is pain in the right ear. This is a new problem. Episode onset: x 5 days ago. The problem occurs constantly. The problem has been unchanged. There has been no fever. The pain is moderate. Associated symptoms include vomiting (Patient reports 1 episode of vomiting, which she attributes to morning sickness.). Pertinent negatives include no coughing, diarrhea, ear discharge, headaches or sore throat. She has tried acetaminophen and antibiotics (The patient recently saw her PCP who prescribed her cefdinir for a presumed ear infection.  The patient started taking the antibiotic on Wednesday, 9/7.) for the symptoms.       The patient is 8 weeks pregnant.      PMH:  has no past medical history on file.  MEDS:   Current Outpatient Medications:     cefdinir (OMNICEF) 300 MG Cap, Take 1 Capsule by mouth 2 times a day for 7 days., Disp: 14 Capsule, Rfl: 0    Prenatal Vit-Fe Fumarate-FA (PRENATAL PO), Take  by mouth., Disp: , Rfl:   ALLERGIES:   Allergies   Allergen Reactions    Gluten Meal      SURGHX:   Past Surgical History:   Procedure Laterality Date    FOOT SURGERY Left      SOCHX:  reports that she has never smoked. She has never used smokeless tobacco. She reports that she does not currently use alcohol. She reports that she does not use drugs.  FH: Family history was reviewed, no pertinent findings to report    Review of Systems   Constitutional:  Negative for fever.   HENT:  Positive for ear pain. Negative for congestion, ear discharge and sore throat.    Eyes:  Negative for discharge and redness.   Respiratory:  Negative for cough.    Gastrointestinal:  Positive for nausea (The patient reports associated nausea, which she attributes to morning sickness.) and vomiting (Patient reports 1 episode of vomiting, which she attributes to morning sickness.). Negative for diarrhea.  "  Musculoskeletal:  Negative for myalgias.   Neurological:  Negative for headaches.            Objective     /64 (BP Location: Left arm, Patient Position: Sitting)   Pulse 74   Temp 37 °C (98.6 °F) (Temporal)   Resp 18   Ht 1.753 m (5' 9\")   Wt 65.2 kg (143 lb 12.8 oz)   LMP 07/16/2022 (Exact Date)   SpO2 99%   BMI 21.24 kg/m²      Physical Exam  Constitutional:       General: She is not in acute distress.     Appearance: Normal appearance. She is not ill-appearing.   HENT:      Head: Normocephalic and atraumatic.      Right Ear: Tympanic membrane and external ear normal.      Left Ear: Tympanic membrane, ear canal and external ear normal.      Ears:      Comments:   The patient's right ear canal was erythematous and slightly edematous with dry, flaky skin near the opening of the ear canal.  No visible exudates.  No discharge/drainage.  The patient's right TM was clear without erythema or signs of infection.  Eyes:      Extraocular Movements: Extraocular movements intact.      Conjunctiva/sclera: Conjunctivae normal.   Cardiovascular:      Rate and Rhythm: Normal rate and regular rhythm.      Heart sounds: Normal heart sounds.   Pulmonary:      Effort: Pulmonary effort is normal. No respiratory distress.      Breath sounds: Normal breath sounds. No wheezing.   Musculoskeletal:         General: Normal range of motion.      Cervical back: Normal range of motion and neck supple.   Skin:     General: Skin is warm and dry.   Neurological:      Mental Status: She is alert and oriented to person, place, and time.                           Assessment & Plan          1. Otalgia of right ear    The patient's presenting symptoms and physical exam findings are consistent with otalgia of the right ear.  On physical exam, the patient's right ear canal was erythematous and slightly edematous with dry, flaky skin near the opening of the ear canal.  No visible exudates or discharge/drainage was noted.  The patient's " right TM was clear without erythema or signs of infection.  Based on the patient's presenting symptoms and physical exam findings, it appears the patient symptoms may be related to an acute otitis externa.  Unfortunately, the safety of using topical antibiotic eardrops is questionable during pregnancy per information reported on up-to-date.  Advised patient to continue the Cefdinir as prescribed.  Recommend OTC medications and supportive care for symptomatic management.  Recommend patient follow-up with her PCP and/or OB.  Discussed return precautions with the patient, and she verbalized understanding.    Differential diagnoses, supportive care, and indications for immediate follow-up discussed with patient.   Instructed to return to clinic or nearest emergency department for any change in condition, further concerns, or worsening of symptoms.    Continue Cefdinir as prescribed  OTC Tylenol for fever/discomfort.  Drink plenty of fluids  Follow-up with PCP  Return to clinic or go to the ED if symptoms worsen or fail to improve, or if the patient should develop worsening/increasing ear pain, drainage from the affected ear, cough, congestion, sore throat, fever/chills, and/or any concerning symptoms.    Discussed plan with the patient, and she agrees to the above.     I personally reviewed prior external notes and test results pertinent to today's visit.  I have independently reviewed and interpreted all diagnostics ordered during this urgent care visit.     Please note that this dictation was created using voice recognition software. I have made every reasonable attempt to correct obvious errors, but I expect that there may be errors of grammar and possibly content that I did not discover before finalizing the note.     This note was electronically signed by Angelic Soliz PA-C

## 2022-09-20 LAB
ABO GROUP BLD: NORMAL
BLD GP AB SCN SERPL QL: NORMAL
C TRACH DNA GENITAL QL NAA+PROBE: NORMAL
HBV SURFACE AG SERPL QL IA: NORMAL
HIV 1+2 AB+HIV1 P24 AG SERPL QL IA: NORMAL
HPV16 DNA SPEC QL NAA+PROBE: NORMAL
N GONORRHOEA DNA GENITAL QL NAA+PROBE: NORMAL
RH BLD: NORMAL
RUBV IGG SERPL IA-ACNC: NORMAL

## 2022-12-12 LAB — TREPONEMA PALLIDUM IGG+IGM AB [PRESENCE] IN SERUM OR PLASMA BY IMMUNOASSAY: NORMAL

## 2023-03-09 ENCOUNTER — OFFICE VISIT (OUTPATIENT)
Dept: URGENT CARE | Facility: CLINIC | Age: 38
End: 2023-03-09
Payer: COMMERCIAL

## 2023-03-09 ENCOUNTER — HOSPITAL ENCOUNTER (OUTPATIENT)
Facility: MEDICAL CENTER | Age: 38
End: 2023-03-09
Attending: PHYSICIAN ASSISTANT
Payer: COMMERCIAL

## 2023-03-09 VITALS
TEMPERATURE: 97.9 F | OXYGEN SATURATION: 97 % | SYSTOLIC BLOOD PRESSURE: 124 MMHG | WEIGHT: 181 LBS | HEART RATE: 90 BPM | BODY MASS INDEX: 26.81 KG/M2 | HEIGHT: 69 IN | DIASTOLIC BLOOD PRESSURE: 72 MMHG | RESPIRATION RATE: 18 BRPM

## 2023-03-09 DIAGNOSIS — N30.90 CYSTITIS: ICD-10-CM

## 2023-03-09 DIAGNOSIS — Z3A.33 33 WEEKS GESTATION OF PREGNANCY: ICD-10-CM

## 2023-03-09 LAB
APPEARANCE UR: CLEAR
BILIRUB UR STRIP-MCNC: NORMAL MG/DL
COLOR UR AUTO: YELLOW
GLUCOSE UR STRIP.AUTO-MCNC: NORMAL MG/DL
KETONES UR STRIP.AUTO-MCNC: NORMAL MG/DL
LEUKOCYTE ESTERASE UR QL STRIP.AUTO: NORMAL
NITRITE UR QL STRIP.AUTO: NORMAL
PH UR STRIP.AUTO: 7 [PH] (ref 5–8)
PROT UR QL STRIP: NORMAL MG/DL
RBC UR QL AUTO: NORMAL
SP GR UR STRIP.AUTO: 1.01
UROBILINOGEN UR STRIP-MCNC: 0.2 MG/DL

## 2023-03-09 PROCEDURE — 87086 URINE CULTURE/COLONY COUNT: CPT

## 2023-03-09 PROCEDURE — 81002 URINALYSIS NONAUTO W/O SCOPE: CPT | Performed by: PHYSICIAN ASSISTANT

## 2023-03-09 PROCEDURE — 99214 OFFICE O/P EST MOD 30 MIN: CPT | Performed by: PHYSICIAN ASSISTANT

## 2023-03-09 RX ORDER — CEPHALEXIN 500 MG/1
500 CAPSULE ORAL 2 TIMES DAILY
Qty: 10 CAPSULE | Refills: 0 | Status: SHIPPED | OUTPATIENT
Start: 2023-03-09 | End: 2023-03-14

## 2023-03-09 RX ORDER — PROGESTERONE 200 MG/1
CAPSULE ORAL
Status: ON HOLD | COMMUNITY
Start: 2023-02-28 | End: 2023-04-18

## 2023-03-09 ASSESSMENT — ENCOUNTER SYMPTOMS
RESPIRATORY NEGATIVE: 1
CHILLS: 0
MUSCULOSKELETAL NEGATIVE: 1
NAUSEA: 0
FEVER: 0
FLANK PAIN: 0
ABDOMINAL PAIN: 0
VOMITING: 0
DIARRHEA: 0
NEUROLOGICAL NEGATIVE: 1
CARDIOVASCULAR NEGATIVE: 1

## 2023-03-09 ASSESSMENT — FIBROSIS 4 INDEX: FIB4 SCORE: 0.39

## 2023-03-09 NOTE — PROGRESS NOTES
Subjective     Mini Howe is a very pleasant 37 y.o. female who presents with UTI (X 1 day, frequency, odor, 33 weeks pregnant. Gestational diabetes.)            Patient is 33 weeks pregnant.  She is having slight dysuria, frequency and foul-smelling urine.  Patient denies abdominal pain or abnormal vaginal bleeding.    Dysuria   This is a new problem. The current episode started yesterday. The problem occurs every urination. The problem has been unchanged. The quality of the pain is described as burning. The pain is at a severity of 3/10. The pain is mild. There has been no fever. The fever has been present for Less than 1 day. There is No history of pyelonephritis. Associated symptoms include frequency, a possible pregnancy and urgency. Pertinent negatives include no chills, discharge, flank pain, hematuria, nausea or vomiting. She has tried nothing for the symptoms. The treatment provided no relief.       PMH:  has no past medical history on file.  MEDS:   Current Outpatient Medications:     aspirin EC (ECOTRIN) 81 MG Tablet Delayed Response, Take 81 mg by mouth every day., Disp: , Rfl:     cephALEXin (KEFLEX) 500 MG Cap, Take 1 Capsule by mouth 2 times a day for 5 days., Disp: 10 Capsule, Rfl: 0    Prenatal Vit-Fe Fumarate-FA (PRENATAL PO), Take  by mouth., Disp: , Rfl:     progesterone (PROMETRIUM) 200 MG capsule, PLACE 1 CAPSULE IN VAGINA AT BEDTIME, Disp: , Rfl:   ALLERGIES:   Allergies   Allergen Reactions    Gluten Meal      SURGHX:   Past Surgical History:   Procedure Laterality Date    FOOT SURGERY Left      SOCHX:  reports that she has never smoked. She has never used smokeless tobacco. She reports that she does not currently use alcohol. She reports that she does not use drugs.  FH: family history includes Alzheimer's Disease in her paternal grandfather; Breast Cancer (age of onset: 42) in her maternal aunt; Cancer (age of onset: 64) in her father; Diabetes in her maternal grandmother; No Known  "Problems in her brother and mother.    Review of Systems   Constitutional:  Negative for chills and fever.   HENT: Negative.     Respiratory: Negative.     Cardiovascular: Negative.    Gastrointestinal:  Negative for abdominal pain, diarrhea, nausea and vomiting.   Genitourinary:  Positive for dysuria, frequency and urgency. Negative for flank pain and hematuria.   Musculoskeletal: Negative.    Neurological: Negative.        Medications, Allergies, and current problem list reviewed today in Epic           Objective     /72   Pulse 90   Temp 36.6 °C (97.9 °F) (Temporal)   Resp 18   Ht 1.753 m (5' 9\")   Wt 82.1 kg (181 lb)   LMP 07/16/2022 (Exact Date)   SpO2 97%   BMI 26.73 kg/m²      Physical Exam  Vitals and nursing note reviewed.   Constitutional:       General: She is not in acute distress.     Appearance: Normal appearance. She is well-developed. She is not ill-appearing, toxic-appearing or diaphoretic.   HENT:      Head: Normocephalic and atraumatic.      Right Ear: Hearing and external ear normal.      Left Ear: Hearing and external ear normal.      Nose: Nose normal.      Mouth/Throat:      Dentition: Normal dentition. No dental caries.   Eyes:      General:         Right eye: No discharge.         Left eye: No discharge.      Conjunctiva/sclera: Conjunctivae normal.   Neck:      Thyroid: No thyromegaly.      Trachea: No tracheal deviation.   Cardiovascular:      Rate and Rhythm: Normal rate and regular rhythm.      Heart sounds: Normal heart sounds.   Pulmonary:      Effort: Pulmonary effort is normal. No respiratory distress.      Breath sounds: Normal breath sounds. No wheezing, rhonchi or rales.   Abdominal:      General: Abdomen is flat. There is no distension.      Palpations: Abdomen is soft.      Tenderness: There is no abdominal tenderness. There is no right CVA tenderness, left CVA tenderness, guarding or rebound.      Comments: Fetal movement noted   Musculoskeletal:      Cervical " back: Normal range of motion and neck supple.      Right lower leg: No edema.      Left lower leg: No edema.   Skin:     General: Skin is warm and dry.      Nails: There is no clubbing.   Neurological:      General: No focal deficit present.      Mental Status: She is alert and oriented to person, place, and time. Mental status is at baseline.   Psychiatric:         Mood and Affect: Mood normal.         Behavior: Behavior normal.         Thought Content: Thought content normal.         Judgment: Judgment normal.                           Assessment & Plan     This is a very pleasant 37-year-old female presenting with dysuria, frequency and foul-smelling urine since last night.  Patient is 33 weeks pregnant.  She denies fever, abdominal pain, vomiting, flank pain, abnormal vaginal bleeding, vaginal discharge.  No history of complicated UTI, pyelonephritis or kidney disease.  Vital signs normal.  Exam shows no abdominal tenderness or CVA tenderness.  Fetal movement noted.  Urinalysis with trace leukocytes otherwise negative.  Patient be treated early cystitis based on symptoms and presentation.  Urine culture will be completed.  There are no signs of renal involvement at this time.    1. Cystitis  cephALEXin (KEFLEX) 500 MG Cap    POCT Urinalysis    URINE CULTURE(NEW)      2. 33 weeks gestation of pregnancy            Take full course of antibiotic  Urine culture initiated, I will only reach patient if I need to change antibiotic pending results  Significantly increase fluids  Signs and symptoms of worsening infection discussed at length    Return to clinic or go to ED if symptoms worsen or persist. Red flag symptoms and indications for ED discussed at length. Patient/Parent/Guardian voices understanding. Follow-up with your primary care provider in 3-5 days. All side effects and potential interactions of prescribed medication discussed including allergic response, GI upset, tendon injury, rash, sedation  etc.    Please note that this dictation was created using voice recognition software. I have made every reasonable attempt to correct obvious errors, but I expect that there are errors of grammar and possibly content that I did not discover before finalizing the note.

## 2023-03-12 LAB
BACTERIA UR CULT: NORMAL
SIGNIFICANT IND 70042: NORMAL
SITE SITE: NORMAL
SOURCE SOURCE: NORMAL

## 2023-03-25 LAB — GP B STREP DNA SPEC QL NAA+PROBE: NORMAL

## 2023-04-18 ENCOUNTER — HOSPITAL ENCOUNTER (INPATIENT)
Facility: MEDICAL CENTER | Age: 38
LOS: 2 days | End: 2023-04-20
Attending: OBSTETRICS & GYNECOLOGY | Admitting: OBSTETRICS & GYNECOLOGY
Payer: COMMERCIAL

## 2023-04-18 ENCOUNTER — APPOINTMENT (OUTPATIENT)
Dept: OBGYN | Facility: MEDICAL CENTER | Age: 38
End: 2023-04-18
Attending: OBSTETRICS & GYNECOLOGY
Payer: COMMERCIAL

## 2023-04-18 PROBLEM — O24.419 GESTATIONAL DIABETES: Status: ACTIVE | Noted: 2023-04-18

## 2023-04-18 LAB
BASOPHILS # BLD AUTO: 0.4 % (ref 0–1.8)
BASOPHILS # BLD: 0.03 K/UL (ref 0–0.12)
EOSINOPHIL # BLD AUTO: 0.17 K/UL (ref 0–0.51)
EOSINOPHIL NFR BLD: 2.5 % (ref 0–6.9)
ERYTHROCYTE [DISTWIDTH] IN BLOOD BY AUTOMATED COUNT: 37.5 FL (ref 35.9–50)
HCT VFR BLD AUTO: 34.3 % (ref 37–47)
HGB BLD-MCNC: 11.9 G/DL (ref 12–16)
HOLDING TUBE BB 8507: NORMAL
IMM GRANULOCYTES # BLD AUTO: 0.08 K/UL (ref 0–0.11)
IMM GRANULOCYTES NFR BLD AUTO: 1.2 % (ref 0–0.9)
LYMPHOCYTES # BLD AUTO: 2.03 K/UL (ref 1–4.8)
LYMPHOCYTES NFR BLD: 29.6 % (ref 22–41)
MCH RBC QN AUTO: 27.9 PG (ref 27–33)
MCHC RBC AUTO-ENTMCNC: 34.7 G/DL (ref 33.6–35)
MCV RBC AUTO: 80.5 FL (ref 81.4–97.8)
MONOCYTES # BLD AUTO: 0.73 K/UL (ref 0–0.85)
MONOCYTES NFR BLD AUTO: 10.6 % (ref 0–13.4)
NEUTROPHILS # BLD AUTO: 3.82 K/UL (ref 2–7.15)
NEUTROPHILS NFR BLD: 55.7 % (ref 44–72)
NRBC # BLD AUTO: 0 K/UL
NRBC BLD-RTO: 0 /100 WBC
PLATELET # BLD AUTO: 269 K/UL (ref 164–446)
PMV BLD AUTO: 10.2 FL (ref 9–12.9)
RBC # BLD AUTO: 4.26 M/UL (ref 4.2–5.4)
T PALLIDUM AB SER QL IA: NORMAL
WBC # BLD AUTO: 6.9 K/UL (ref 4.8–10.8)

## 2023-04-18 PROCEDURE — 85025 COMPLETE CBC W/AUTO DIFF WBC: CPT

## 2023-04-18 PROCEDURE — 700111 HCHG RX REV CODE 636 W/ 250 OVERRIDE (IP): Performed by: OBSTETRICS & GYNECOLOGY

## 2023-04-18 PROCEDURE — 36415 COLL VENOUS BLD VENIPUNCTURE: CPT

## 2023-04-18 PROCEDURE — 86780 TREPONEMA PALLIDUM: CPT

## 2023-04-18 PROCEDURE — 700105 HCHG RX REV CODE 258: Performed by: OBSTETRICS & GYNECOLOGY

## 2023-04-18 PROCEDURE — 3E033VJ INTRODUCTION OF OTHER HORMONE INTO PERIPHERAL VEIN, PERCUTANEOUS APPROACH: ICD-10-PCS | Performed by: OBSTETRICS & GYNECOLOGY

## 2023-04-18 PROCEDURE — 770002 HCHG ROOM/CARE - OB PRIVATE (112)

## 2023-04-18 RX ORDER — SODIUM CHLORIDE, SODIUM LACTATE, POTASSIUM CHLORIDE, CALCIUM CHLORIDE 600; 310; 30; 20 MG/100ML; MG/100ML; MG/100ML; MG/100ML
1000 INJECTION, SOLUTION INTRAVENOUS CONTINUOUS
Status: ACTIVE | OUTPATIENT
Start: 2023-04-18 | End: 2023-04-19

## 2023-04-18 RX ORDER — TERBUTALINE SULFATE 1 MG/ML
0.25 INJECTION, SOLUTION SUBCUTANEOUS
Status: DISCONTINUED | OUTPATIENT
Start: 2023-04-18 | End: 2023-04-19

## 2023-04-18 RX ORDER — IBUPROFEN 800 MG/1
800 TABLET ORAL
Status: DISCONTINUED | OUTPATIENT
Start: 2023-04-18 | End: 2023-04-19 | Stop reason: HOSPADM

## 2023-04-18 RX ORDER — OXYTOCIN 10 [USP'U]/ML
10 INJECTION, SOLUTION INTRAMUSCULAR; INTRAVENOUS
Status: DISCONTINUED | OUTPATIENT
Start: 2023-04-18 | End: 2023-04-19 | Stop reason: HOSPADM

## 2023-04-18 RX ORDER — DEXTROSE, SODIUM CHLORIDE, SODIUM LACTATE, POTASSIUM CHLORIDE, AND CALCIUM CHLORIDE 5; .6; .31; .03; .02 G/100ML; G/100ML; G/100ML; G/100ML; G/100ML
INJECTION, SOLUTION INTRAVENOUS CONTINUOUS
Status: DISCONTINUED | OUTPATIENT
Start: 2023-04-19 | End: 2023-04-19

## 2023-04-18 RX ORDER — MISOPROSTOL 200 UG/1
800 TABLET ORAL
Status: DISCONTINUED | OUTPATIENT
Start: 2023-04-18 | End: 2023-04-19 | Stop reason: HOSPADM

## 2023-04-18 RX ORDER — METHYLERGONOVINE MALEATE 0.2 MG/ML
0.2 INJECTION INTRAVENOUS
Status: DISCONTINUED | OUTPATIENT
Start: 2023-04-18 | End: 2023-04-19 | Stop reason: HOSPADM

## 2023-04-18 RX ORDER — LIDOCAINE HYDROCHLORIDE 10 MG/ML
20 INJECTION, SOLUTION INFILTRATION; PERINEURAL
Status: DISCONTINUED | OUTPATIENT
Start: 2023-04-18 | End: 2023-04-19 | Stop reason: HOSPADM

## 2023-04-18 RX ORDER — ALUMINA, MAGNESIA, AND SIMETHICONE 2400; 2400; 240 MG/30ML; MG/30ML; MG/30ML
30 SUSPENSION ORAL EVERY 6 HOURS PRN
Status: DISCONTINUED | OUTPATIENT
Start: 2023-04-18 | End: 2023-04-19 | Stop reason: HOSPADM

## 2023-04-18 RX ORDER — ACETAMINOPHEN 500 MG
1000 TABLET ORAL
Status: DISCONTINUED | OUTPATIENT
Start: 2023-04-18 | End: 2023-04-19 | Stop reason: HOSPADM

## 2023-04-18 RX ADMIN — SODIUM CHLORIDE, POTASSIUM CHLORIDE, SODIUM LACTATE AND CALCIUM CHLORIDE 1000 ML: 600; 310; 30; 20 INJECTION, SOLUTION INTRAVENOUS at 23:05

## 2023-04-18 RX ADMIN — SODIUM CHLORIDE 5 MILLION UNITS: 900 INJECTION INTRAVENOUS at 23:07

## 2023-04-18 RX ADMIN — OXYTOCIN 2 MILLI-UNITS/MIN: 10 INJECTION, SOLUTION INTRAMUSCULAR; INTRAVENOUS at 23:12

## 2023-04-18 ASSESSMENT — FIBROSIS 4 INDEX: FIB4 SCORE: 0.39

## 2023-04-18 ASSESSMENT — PATIENT HEALTH QUESTIONNAIRE - PHQ9
2. FEELING DOWN, DEPRESSED, IRRITABLE, OR HOPELESS: NOT AT ALL
SUM OF ALL RESPONSES TO PHQ9 QUESTIONS 1 AND 2: 0
SUM OF ALL RESPONSES TO PHQ9 QUESTIONS 1 AND 2: 0
1. LITTLE INTEREST OR PLEASURE IN DOING THINGS: NOT AT ALL
2. FEELING DOWN, DEPRESSED, IRRITABLE, OR HOPELESS: NOT AT ALL
1. LITTLE INTEREST OR PLEASURE IN DOING THINGS: NOT AT ALL

## 2023-04-18 ASSESSMENT — LIFESTYLE VARIABLES
AVERAGE NUMBER OF DAYS PER WEEK YOU HAVE A DRINK CONTAINING ALCOHOL: 0
DOES PATIENT WANT TO STOP DRINKING: NO
EVER FELT BAD OR GUILTY ABOUT YOUR DRINKING: NO
CONSUMPTION TOTAL: NEGATIVE
ON A TYPICAL DAY WHEN YOU DRINK ALCOHOL HOW MANY DRINKS DO YOU HAVE: 0
TOTAL SCORE: 0
HOW MANY TIMES IN THE PAST YEAR HAVE YOU HAD 5 OR MORE DRINKS IN A DAY: 0
TOTAL SCORE: 0
HAVE PEOPLE ANNOYED YOU BY CRITICIZING YOUR DRINKING: NO
EVER HAD A DRINK FIRST THING IN THE MORNING TO STEADY YOUR NERVES TO GET RID OF A HANGOVER: NO
EVER_SMOKED: NEVER
HAVE YOU EVER FELT YOU SHOULD CUT DOWN ON YOUR DRINKING: NO
ALCOHOL_USE: NO
TOTAL SCORE: 0

## 2023-04-18 ASSESSMENT — PAIN SCALES - GENERAL: PAINLEVEL: 0 - NO PAIN

## 2023-04-19 ENCOUNTER — ANESTHESIA EVENT (OUTPATIENT)
Dept: ANESTHESIOLOGY | Facility: MEDICAL CENTER | Age: 38
End: 2023-04-19
Payer: COMMERCIAL

## 2023-04-19 ENCOUNTER — ANESTHESIA (OUTPATIENT)
Dept: ANESTHESIOLOGY | Facility: MEDICAL CENTER | Age: 38
End: 2023-04-19
Payer: COMMERCIAL

## 2023-04-19 LAB
ERYTHROCYTE [DISTWIDTH] IN BLOOD BY AUTOMATED COUNT: 37.3 FL (ref 35.9–50)
HCT VFR BLD AUTO: 31.7 % (ref 37–47)
HGB BLD-MCNC: 10.9 G/DL (ref 12–16)
MCH RBC QN AUTO: 27.7 PG (ref 27–33)
MCHC RBC AUTO-ENTMCNC: 34.4 G/DL (ref 33.6–35)
MCV RBC AUTO: 80.7 FL (ref 81.4–97.8)
PLATELET # BLD AUTO: 252 K/UL (ref 164–446)
PMV BLD AUTO: 10.1 FL (ref 9–12.9)
RBC # BLD AUTO: 3.93 M/UL (ref 4.2–5.4)
WBC # BLD AUTO: 9.6 K/UL (ref 4.8–10.8)

## 2023-04-19 PROCEDURE — 0HQ9XZZ REPAIR PERINEUM SKIN, EXTERNAL APPROACH: ICD-10-PCS | Performed by: OBSTETRICS & GYNECOLOGY

## 2023-04-19 PROCEDURE — 700101 HCHG RX REV CODE 250: Performed by: OBSTETRICS & GYNECOLOGY

## 2023-04-19 PROCEDURE — 700111 HCHG RX REV CODE 636 W/ 250 OVERRIDE (IP)

## 2023-04-19 PROCEDURE — 36415 COLL VENOUS BLD VENIPUNCTURE: CPT

## 2023-04-19 PROCEDURE — 700105 HCHG RX REV CODE 258: Performed by: OBSTETRICS & GYNECOLOGY

## 2023-04-19 PROCEDURE — 59409 OBSTETRICAL CARE: CPT

## 2023-04-19 PROCEDURE — 304965 HCHG RECOVERY SERVICES

## 2023-04-19 PROCEDURE — 770002 HCHG ROOM/CARE - OB PRIVATE (112)

## 2023-04-19 PROCEDURE — 700111 HCHG RX REV CODE 636 W/ 250 OVERRIDE (IP): Performed by: ANESTHESIOLOGY

## 2023-04-19 PROCEDURE — 303615 HCHG EPIDURAL/SPINAL ANESTHESIA FOR LABOR

## 2023-04-19 PROCEDURE — 01967 NEURAXL LBR ANES VAG DLVR: CPT | Performed by: ANESTHESIOLOGY

## 2023-04-19 PROCEDURE — A9270 NON-COVERED ITEM OR SERVICE: HCPCS | Performed by: OBSTETRICS & GYNECOLOGY

## 2023-04-19 PROCEDURE — 85027 COMPLETE CBC AUTOMATED: CPT

## 2023-04-19 PROCEDURE — 700102 HCHG RX REV CODE 250 W/ 637 OVERRIDE(OP): Performed by: OBSTETRICS & GYNECOLOGY

## 2023-04-19 PROCEDURE — 700111 HCHG RX REV CODE 636 W/ 250 OVERRIDE (IP): Performed by: OBSTETRICS & GYNECOLOGY

## 2023-04-19 RX ORDER — DOCUSATE SODIUM 100 MG/1
100 CAPSULE, LIQUID FILLED ORAL 2 TIMES DAILY PRN
Status: DISCONTINUED | OUTPATIENT
Start: 2023-04-19 | End: 2023-04-20 | Stop reason: HOSPADM

## 2023-04-19 RX ORDER — BUPIVACAINE HYDROCHLORIDE 2.5 MG/ML
INJECTION, SOLUTION EPIDURAL; INFILTRATION; INTRACAUDAL
Status: DISCONTINUED
Start: 2023-04-19 | End: 2023-04-19

## 2023-04-19 RX ORDER — ACETAMINOPHEN 500 MG
1000 TABLET ORAL EVERY 6 HOURS PRN
Status: DISCONTINUED | OUTPATIENT
Start: 2023-04-19 | End: 2023-04-20 | Stop reason: HOSPADM

## 2023-04-19 RX ORDER — SODIUM CHLORIDE, SODIUM LACTATE, POTASSIUM CHLORIDE, AND CALCIUM CHLORIDE .6; .31; .03; .02 G/100ML; G/100ML; G/100ML; G/100ML
1000 INJECTION, SOLUTION INTRAVENOUS
Status: DISCONTINUED | OUTPATIENT
Start: 2023-04-19 | End: 2023-04-19

## 2023-04-19 RX ORDER — VITAMIN A ACETATE, BETA CAROTENE, ASCORBIC ACID, CHOLECALCIFEROL, .ALPHA.-TOCOPHEROL ACETATE, DL-, THIAMINE MONONITRATE, RIBOFLAVIN, NIACINAMIDE, PYRIDOXINE HYDROCHLORIDE, FOLIC ACID, CYANOCOBALAMIN, CALCIUM CARBONATE, FERROUS FUMARATE, ZINC OXIDE, CUPRIC OXIDE 3080; 12; 120; 400; 1; 1.84; 3; 20; 22; 920; 25; 200; 27; 10; 2 [IU]/1; UG/1; MG/1; [IU]/1; MG/1; MG/1; MG/1; MG/1; MG/1; [IU]/1; MG/1; MG/1; MG/1; MG/1; MG/1
1 TABLET, FILM COATED ORAL
Status: DISCONTINUED | OUTPATIENT
Start: 2023-04-19 | End: 2023-04-20 | Stop reason: HOSPADM

## 2023-04-19 RX ORDER — BUPIVACAINE HYDROCHLORIDE 2.5 MG/ML
INJECTION, SOLUTION EPIDURAL; INFILTRATION; INTRACAUDAL PRN
Status: DISCONTINUED | OUTPATIENT
Start: 2023-04-19 | End: 2023-04-19 | Stop reason: SURG

## 2023-04-19 RX ORDER — EPHEDRINE SULFATE 50 MG/ML
5 INJECTION, SOLUTION INTRAVENOUS
Status: DISCONTINUED | OUTPATIENT
Start: 2023-04-19 | End: 2023-04-19

## 2023-04-19 RX ORDER — OXYCODONE HYDROCHLORIDE 5 MG/1
5 TABLET ORAL EVERY 4 HOURS PRN
Status: DISCONTINUED | OUTPATIENT
Start: 2023-04-19 | End: 2023-04-20 | Stop reason: HOSPADM

## 2023-04-19 RX ORDER — IBUPROFEN 800 MG/1
800 TABLET ORAL EVERY 8 HOURS PRN
Status: DISCONTINUED | OUTPATIENT
Start: 2023-04-19 | End: 2023-04-20 | Stop reason: HOSPADM

## 2023-04-19 RX ORDER — CALCIUM CARBONATE 500 MG/1
1000 TABLET, CHEWABLE ORAL EVERY 6 HOURS PRN
Status: DISCONTINUED | OUTPATIENT
Start: 2023-04-19 | End: 2023-04-20 | Stop reason: HOSPADM

## 2023-04-19 RX ORDER — SIMETHICONE 125 MG
125 TABLET,CHEWABLE ORAL 4 TIMES DAILY PRN
Status: DISCONTINUED | OUTPATIENT
Start: 2023-04-19 | End: 2023-04-20 | Stop reason: HOSPADM

## 2023-04-19 RX ORDER — ROPIVACAINE HYDROCHLORIDE 2 MG/ML
INJECTION, SOLUTION EPIDURAL; INFILTRATION; PERINEURAL CONTINUOUS
Status: DISCONTINUED | OUTPATIENT
Start: 2023-04-19 | End: 2023-04-19

## 2023-04-19 RX ORDER — SODIUM CHLORIDE, SODIUM LACTATE, POTASSIUM CHLORIDE, CALCIUM CHLORIDE 600; 310; 30; 20 MG/100ML; MG/100ML; MG/100ML; MG/100ML
INJECTION, SOLUTION INTRAVENOUS PRN
Status: DISCONTINUED | OUTPATIENT
Start: 2023-04-19 | End: 2023-04-20 | Stop reason: HOSPADM

## 2023-04-19 RX ORDER — MISOPROSTOL 200 UG/1
600 TABLET ORAL
Status: DISCONTINUED | OUTPATIENT
Start: 2023-04-19 | End: 2023-04-20 | Stop reason: HOSPADM

## 2023-04-19 RX ORDER — SODIUM CHLORIDE, SODIUM LACTATE, POTASSIUM CHLORIDE, AND CALCIUM CHLORIDE .6; .31; .03; .02 G/100ML; G/100ML; G/100ML; G/100ML
250 INJECTION, SOLUTION INTRAVENOUS PRN
Status: DISCONTINUED | OUTPATIENT
Start: 2023-04-19 | End: 2023-04-19

## 2023-04-19 RX ORDER — ROPIVACAINE HYDROCHLORIDE 2 MG/ML
INJECTION, SOLUTION EPIDURAL; INFILTRATION; PERINEURAL
Status: COMPLETED
Start: 2023-04-19 | End: 2023-04-19

## 2023-04-19 RX ADMIN — OXYTOCIN 20 UNITS: 10 INJECTION, SOLUTION INTRAMUSCULAR; INTRAVENOUS at 07:50

## 2023-04-19 RX ADMIN — BUPIVACAINE HYDROCHLORIDE 3 ML: 2.5 INJECTION, SOLUTION EPIDURAL; INFILTRATION; INTRACAUDAL; PERINEURAL at 06:58

## 2023-04-19 RX ADMIN — SODIUM CHLORIDE 2.5 MILLION UNITS: 9 INJECTION, SOLUTION INTRAVENOUS at 02:59

## 2023-04-19 RX ADMIN — FENTANYL CITRATE 50 MCG: 50 INJECTION, SOLUTION INTRAMUSCULAR; INTRAVENOUS at 06:58

## 2023-04-19 RX ADMIN — OXYTOCIN 125 ML/HR: 10 INJECTION, SOLUTION INTRAMUSCULAR; INTRAVENOUS at 09:32

## 2023-04-19 RX ADMIN — FENTANYL CITRATE 100 MCG: 50 INJECTION, SOLUTION INTRAMUSCULAR; INTRAVENOUS at 06:33

## 2023-04-19 RX ADMIN — IBUPROFEN 800 MG: 800 TABLET, FILM COATED ORAL at 16:13

## 2023-04-19 RX ADMIN — FENTANYL CITRATE 100 MCG: 50 INJECTION, SOLUTION INTRAMUSCULAR; INTRAVENOUS at 05:31

## 2023-04-19 RX ADMIN — BUPIVACAINE HYDROCHLORIDE 3 ML: 2.5 INJECTION, SOLUTION EPIDURAL; INFILTRATION; INTRACAUDAL; PERINEURAL at 06:54

## 2023-04-19 RX ADMIN — SODIUM CHLORIDE 2.5 MILLION UNITS: 9 INJECTION, SOLUTION INTRAVENOUS at 07:09

## 2023-04-19 RX ADMIN — ROPIVACAINE HYDROCHLORIDE: 2 INJECTION, SOLUTION EPIDURAL; INFILTRATION; PERINEURAL at 07:02

## 2023-04-19 RX ADMIN — ROPIVACAINE HYDROCHLORIDE: 2 INJECTION, SOLUTION EPIDURAL; INFILTRATION at 07:02

## 2023-04-19 RX ADMIN — FENTANYL CITRATE 50 MCG: 50 INJECTION, SOLUTION INTRAMUSCULAR; INTRAVENOUS at 06:54

## 2023-04-19 ASSESSMENT — PAIN DESCRIPTION - PAIN TYPE
TYPE: ACUTE PAIN

## 2023-04-19 ASSESSMENT — PAIN SCALES - GENERAL: PAIN_LEVEL: 0

## 2023-04-19 NOTE — PROGRESS NOTES
0700- Bedside report received from SVETA Deng. Epidural completed. Patient feeling pressure and SROM at 0705 with clear fluid. SVE 10/100/+2 per flowsheets. Dr. Carrasco updated. Delivery preparations made.     0739-  of viable female infant, 8/9 apgars.     1030- Patient transferred to PPU, bedside report given to SVETA Stringer. Baby bands double checked and confirmed to be correct.

## 2023-04-19 NOTE — ANESTHESIA PROCEDURE NOTES
Epidural Block    Date/Time: 4/19/2023 6:49 AM  Performed by: Everett Castaneda M.D.  Authorized by: Everett Castaneda M.D.     Patient Location:  OB  Start Time:  4/19/2023 6:49 AM  End Time:  4/19/2023 6:54 AM  Reason for Block: labor analgesia    patient identified, IV checked, site marked, risks and benefits discussed, surgical consent, monitors and equipment checked, pre-op evaluation and timeout performed    Patient Position:  Sitting  Prep: ChloraPrep, patient draped and sterile technique    Monitoring:  Blood pressure, continuous pulse oximetry and heart rate  Approach:  Midline  Location:  L3-L4  Injection Technique:  OSMAN saline  Skin infiltration:  Lidocaine  Strength:  1%  Dose:  3ml  Needle Type:  Tuohy  Needle Gauge:  17 G  Needle Length:  3.5 in  Loss of resistance::  4  Catheter Size:  19 G  Catheter at Skin Depth:  11  Test Dose Result:  Negative   Success on 1st pass  No heme/CSF  Catheter threaded easily  Negative aspiration  No evidence of complications  Patient comfortable after loading dose

## 2023-04-19 NOTE — ANESTHESIA PREPROCEDURE EVALUATION
Date: 04/19/23  Procedure: Labor Epidural         Relevant Problems   OB   (positive) Gestational diabetes       Physical Exam    Airway   Mallampati: II  TM distance: >3 FB  Neck ROM: full       Cardiovascular - normal exam  Rhythm: regular  Rate: normal  (-) murmur     Dental - normal exam           Pulmonary - normal exam  Breath sounds clear to auscultation     Abdominal    Neurological - normal exam                 Anesthesia Plan    ASA 2       Plan - epidural   Neuraxial block will be labor analgesia                  Pertinent diagnostic labs and testing reviewed    Informed Consent:    Anesthetic plan and risks discussed with patient.

## 2023-04-19 NOTE — H&P
DATE OF ADMISSION:  2023     HISTORY OF PRESENT ILLNESS:  The patient is a 37-year-old female, , who   presents to labor and delivery at 39 and 3/7 weeks' gestation for induction of   labor secondary to gestational diabetes and advanced maternal age.  Her   cervix is favorable.  She is 3 cm dilated.  Her pregnancy has been stressful   but relatively uneventful, except for the gestational diabetes.  She did have   hyperemesis early pregnancy.  She has a history of a  delivery with her   prior pregnancy, so she was on progesterone.  She had an elevated 1-hour 160   and had abnormal 3-hour glucose tolerance test but she has been diet   controlled.  Her sugars in fact had been so good that she is only testing her   sugars every other day now.  Her group B strep is positive.  She desires to be   induced.  Again, her cervix is favorable.  She is tamika but not feeling   them but contractions are about 15 minutes apart.     Her pregnancy has been stressful because her son at home has a complete heart   block and has had no complications throughout the course of this pregnancy.    She did have a NIPT test, which showed low-risk female infant.  She had   carrier testing done, which was negative.     PAST MEDICAL HISTORY:  Only significant for eczema.     ALLERGIES:  No known drug allergies but SHE IS ALLERGIC TO GLUTEN.     SOCIAL HISTORY:  She has no history of alcohol, tobacco or drug use during   this pregnancy.     FAMILY HISTORY:  Includes her father with brain cancer, grandfather with   diabetes, maternal grandmother with hypertension and high cholesterol.     PAST SURGICAL HISTORY:  Includes foot surgery from a left foot fracture.     OBSTETRIC HISTORY:  She is a .  She had 1 normal spontaneous vaginal   delivery,  at 36 weeks with her last pregnancy.     GYNECOLOGIC HISTORY:  Last menstrual period 2022.  Her Pap was normal,   HPV high-risk negative.     CURRENT MEDICATIONS:   Include only prenatal vitamins and she was on aspirin 81   mg a day throughout the pregnancy.     PHYSICAL EXAMINATION:  VITAL SIGNS:  Stable.  Blood pressure 115/64.  She is afebrile, pulse 94,   weight 184 pounds.  GENERAL:  Awake, alert, oriented, no acute distress but she is nervous.  CARDIOVASCULAR:  Regular rate and rhythm.  CHEST:  Clear to auscultation bilaterally.  ABDOMEN:  Gravid, nontender, nondistended, normal bowel sounds.  EXTREMITIES:  No cyanosis, clubbing or edema.  PELVIC:  Sterile vaginal exam, she was 3 cm dilated, 50% effaced, -1 station.    Fetal heart rate tracing category 1, reactive, moderate variability.  Again,   she is tamika occasionally.     LABORATORY DATA:  Lab work, her blood count today, hemoglobin 11.9, hematocrit   34.3, platelets 269,000.  Hepatitis B surface antigen negative.  Hepatitis C   antibody negative, RPR nonreactive, rubella is immune.  She is O positive,   antibody screen negative.  HIV is nonreactive.  Pap smear was normal and   gonorrhea, chlamydia and trichomonas were negative.     ASSESSMENT AND PLAN:  A 37-year-old female,  at 39 and 3/7 weeks'   gestation.  1.  A1 gestational diabetic, well controlled with diet only.  We will plan to   just check her blood sugars q. 4 hours when in active labor but will move   towards delivery at this time.  2.  Advanced maternal age.  She had low-risk NIPT testing, normal fetal survey   and growth scans.  3.  Group B strep positive.  We will start penicillin for prophylaxis.  4.  Fetal tracings overall reassuring at this time.  Anticipate spontaneous   vaginal delivery.        ______________________________  MD LONNIE FLORES/DOMINIQUE    DD:  2023 23:52  DT:  2023 00:21    Job#:  [id number]

## 2023-04-19 NOTE — L&D DELIVERY NOTE
DATE OF SERVICE:  2023     PREOPERATIVE DIAGNOSES:  1.  Intrauterine pregnancy at 39 and 4/7th weeks' gestation.  2.  A1 gestational diabetic.  3.  Group B strep positive.     POSTOPERATIVE DIAGNOSES:  1.  Intrauterine pregnancy at 39 and 4/7th weeks' gestation.  2.  A1 gestational diabetic.  3.  Group B strep positive.     PROCEDURE:  Normal spontaneous vaginal delivery.     DELIVERING PHYSICIAN:  Anisha Carrasco MD     ANESTHESIA:  Epidural.     ANESTHESIOLOGIST:  Everett Castaneda MD     COMPLICATIONS:  None.     ESTIMATED BLOOD LOSS:  350 mL     FINDINGS:  Female infant, cephalic presentation, clear amniotic fluid, Apgars   8 and 9, weight still pending at the time of this dictation.  Intact placenta,   3-vessel cord.  First-degree midline laceration repaired with 3-0 Vicryl.    Sponge, laps and needle counts were correct x3.  Mother and baby both doing   well.  Mother will go to postpartum, baby to  nursery.     HOSPITAL COURSE:  The patient arrived for scheduled induction of labor.  She   was started on antibiotics for group B strep prophylaxis.  The goal was to get   a blood sugar on her every 4 hours in active labor, but she went into active   labor, rather quickly.  She received an epidural anesthesia.  She had   spontaneous rupture of membranes after just getting her epidural.  She went to   complete quickly and pushed through a few contractions and delivered a   vigorous female infant over a first-degree midline laceration.  Once the   anterior shoulder delivered, the rest of baby delivered uneventfully.  Nose   and mouth were bulb suctioned.  Delay for cord clamp x30 seconds.  Cord was   then clamped x2 and cut by father of the baby.  Placenta delivered   spontaneously intact, 3-vessel cord.  No complications.  Mother and baby both   doing well.        ______________________________  MD LONNIE FLORES/MARIVEL    DD:  2023 08:01  DT:  2023 12:37    Job#:  [id number]

## 2023-04-19 NOTE — ANESTHESIA TIME REPORT
Anesthesia Start and Stop Event Times     Date Time Event    4/19/2023 0644 Ready for Procedure     0649 Anesthesia Start     0739 Anesthesia Stop        Responsible Staff    Everett Castaneda MD       Overtime Reason:  no overtime (within assigned shift)    Comments:

## 2023-04-19 NOTE — ANESTHESIA TIME REPORT
Anesthesia Start and Stop Event Times     Date Time Event    4/19/2023 0644 Ready for Procedure     0649 Anesthesia Start     0739 Anesthesia Stop        Responsible Staff  04/19/23    Name Role Begin End    Everett Castaneda M.D. Anesth 0649 0739        Overtime Reason:  no overtime (within assigned shift)    Comments:

## 2023-04-19 NOTE — CARE PLAN
The patient is Stable - Low risk of patient condition declining or worsening    Shift Goals  Clinical Goals: Cervical dilation and effacement.  Patient Goals: Healthy baby and delivery.    Progress made toward(s) clinical / shift goals:    Problem: Knowledge Deficit - L&D  Goal: Patient and family/caregivers will demonstrate understanding of plan of care, disease process/condition, diagnostic tests and medications  Outcome: Progressing     Problem: Psychosocial - L&D  Goal: Patient's level of anxiety will decrease  Outcome: Progressing     Problem: Risk for Infection and Impaired Wound Healing  Goal: Patient will remain free from infection  Outcome: Progressing       Patient is not progressing towards the following goals: N/A.

## 2023-04-19 NOTE — ANESTHESIA POSTPROCEDURE EVALUATION
Patient: Mini Howe    Procedure Summary     Date: 04/19/23 Room / Location:     Anesthesia Start: 0649 Anesthesia Stop: 0739    Procedure: Labor Epidural Diagnosis:     Scheduled Providers:  Responsible Provider: Everett Castaneda M.D.    Anesthesia Type: epidural ASA Status: 2          Final Anesthesia Type: epidural  Last vitals  BP   Blood Pressure: 98/54    Temp   36.4 °C (97.5 °F)    Pulse   65   Resp   18    SpO2   99 %      Anesthesia Post Evaluation    Patient location during evaluation: floor  Patient participation: complete - patient participated  Level of consciousness: awake and alert  Pain score: 0    Airway patency: patent  Anesthetic complications: no  Cardiovascular status: hemodynamically stable  Respiratory status: acceptable  Hydration status: euvolemic    PONV: none          No notable events documented.     Nurse Pain Score: 0 (NPRS)

## 2023-04-19 NOTE — PROGRESS NOTES
1030 Assume care from L&D. Oriented patient to room,call light, and emergancy light. Assessment completed,fundus firm,lochia light. Plan of care reviewed, verbilized understanding. Patient denies pain at this time, will call if intervention needed.

## 2023-04-19 NOTE — PROGRESS NOTES
2145- Patient arrived for a scheduled IOL due to GDM and AMA. [place] and EFM applied to patient. Vitals obtained. Admit profile completed. IV initiated. Labs drawn and sent. SVE performed, please refer to flowsheets.     2209- Phone call out to Dr. Carrasco. Dr. Carrasco will be at bedside.     2235- Dr. Carrasco at bedside.     2245- Received orders, please refer to MAR. Patient to have blood sugars checked every 4 hours once in active labor, per Dr. Carrasco.     0630- Patient would like an epidural. LR bolus started.     0648- Dr. Castaneda at bedside for epidural.     0700- Report given to SVETA Phelps. POC discussed.

## 2023-04-20 ENCOUNTER — PHARMACY VISIT (OUTPATIENT)
Dept: PHARMACY | Facility: MEDICAL CENTER | Age: 38
End: 2023-04-20
Payer: COMMERCIAL

## 2023-04-20 VITALS
DIASTOLIC BLOOD PRESSURE: 57 MMHG | BODY MASS INDEX: 27.25 KG/M2 | OXYGEN SATURATION: 96 % | HEIGHT: 69 IN | WEIGHT: 184 LBS | SYSTOLIC BLOOD PRESSURE: 96 MMHG | RESPIRATION RATE: 16 BRPM | HEART RATE: 68 BPM | TEMPERATURE: 98.4 F

## 2023-04-20 PROCEDURE — A9270 NON-COVERED ITEM OR SERVICE: HCPCS | Performed by: OBSTETRICS & GYNECOLOGY

## 2023-04-20 PROCEDURE — 700102 HCHG RX REV CODE 250 W/ 637 OVERRIDE(OP): Performed by: OBSTETRICS & GYNECOLOGY

## 2023-04-20 PROCEDURE — RXMED WILLOW AMBULATORY MEDICATION CHARGE: Performed by: OBSTETRICS & GYNECOLOGY

## 2023-04-20 RX ORDER — PSEUDOEPHEDRINE HCL 30 MG
100 TABLET ORAL 2 TIMES DAILY PRN
Qty: 60 CAPSULE | Refills: 0 | Status: SHIPPED | OUTPATIENT
Start: 2023-04-20 | End: 2024-02-22

## 2023-04-20 RX ORDER — IBUPROFEN 800 MG/1
800 TABLET ORAL EVERY 8 HOURS PRN
Qty: 30 TABLET | Refills: 0 | Status: SHIPPED | OUTPATIENT
Start: 2023-04-20 | End: 2024-02-22

## 2023-04-20 RX ADMIN — IBUPROFEN 800 MG: 800 TABLET, FILM COATED ORAL at 04:46

## 2023-04-20 RX ADMIN — DOCUSATE SODIUM 100 MG: 100 CAPSULE, LIQUID FILLED ORAL at 07:52

## 2023-04-20 RX ADMIN — PRENATAL WITH FERROUS FUM AND FOLIC ACID 1 TABLET: 3080; 920; 120; 400; 22; 1.84; 3; 20; 10; 1; 12; 200; 27; 25; 2 TABLET ORAL at 07:52

## 2023-04-20 RX ADMIN — IBUPROFEN 800 MG: 800 TABLET, FILM COATED ORAL at 14:08

## 2023-04-20 RX ADMIN — ACETAMINOPHEN 1000 MG: 500 TABLET, FILM COATED ORAL at 07:52

## 2023-04-20 ASSESSMENT — EDINBURGH POSTNATAL DEPRESSION SCALE (EPDS)
I HAVE LOOKED FORWARD WITH ENJOYMENT TO THINGS: AS MUCH AS I EVER DID
I HAVE BLAMED MYSELF UNNECESSARILY WHEN THINGS WENT WRONG: NOT VERY OFTEN
I HAVE BEEN ANXIOUS OR WORRIED FOR NO GOOD REASON: HARDLY EVER
THE THOUGHT OF HARMING MYSELF HAS OCCURRED TO ME: NEVER
I HAVE BEEN ABLE TO LAUGH AND SEE THE FUNNY SIDE OF THINGS: AS MUCH AS I ALWAYS COULD
THINGS HAVE BEEN GETTING ON TOP OF ME: NO, I HAVE BEEN COPING AS WELL AS EVER
I HAVE BEEN SO UNHAPPY THAT I HAVE HAD DIFFICULTY SLEEPING: NOT AT ALL
I HAVE BEEN SO UNHAPPY THAT I HAVE BEEN CRYING: NO, NEVER
I HAVE FELT SAD OR MISERABLE: NO, NOT AT ALL
I HAVE FELT SCARED OR PANICKY FOR NO GOOD REASON: NO, NOT AT ALL

## 2023-04-20 ASSESSMENT — PAIN DESCRIPTION - PAIN TYPE
TYPE: ACUTE PAIN

## 2023-04-20 NOTE — LACTATION NOTE
"Mother reports that she is breastfeeding her  without difficulty or discomfort.     She breast fed exclusively for her older child; he was born at 36 weeks gestation. She nursed him for 20 months.     She did experience some issues with \"oversupply\" of milk and baby having regurgitation and emesis.  She reports that this baby girl Janene is doing well. We discussed avoidance of excessive pumping so as to help prevent over stimulation of breasts and triggering too large of a milk production.  Burping techniques demonstrated at parent request.    Resources for out-patient support discussed, hand-outs provided.   "

## 2023-04-20 NOTE — DISCHARGE SUMMARY
Discharge Summary:     Date of Admission: 2023  Date of Discharge: 23      Admitting diagnosis:    1. Pregnancy @ 39w4d  2. A1GDM  3. Advanced maternal age      Discharge Diagnosis:   1. Status post spontaneous vaginal delivery.      History reviewed. No pertinent past medical history.  OB History    Para Term  AB Living   2 2 1 1 0 2   SAB IAB Ectopic Molar Multiple Live Births   0 0 0 0 0 2      # Outcome Date GA Lbr Misha/2nd Weight Sex Delivery Anes PTL Lv   2 Term 23 39w4d / 00:21 3.45 kg (7 lb 9.7 oz) F Vag-Spont EPI N ASHA   1  10/19/20 36w5d / 01:19 3.245 kg (7 lb 2.5 oz) M Vag-Spont EPI N ASHA     Past Surgical History:   Procedure Laterality Date    FOOT SURGERY Left      Gluten meal    Hospital Course:   Pt is a 37 y.o. now  who presented for induction of labor secondary to AMA and A1GDM.  Her labor was induced with pitocin.  She did receive antibiotics for her GBS positive status.  Her labor was unremarkable and she progressed to complete.  Pt underwent spontaneous vaginal delivery, giving birth to a viable female infant with AGPARs of 8/9, weight 3450g.  The patient's postpartum course was unremarkable.  On PPD 1 she felt ready for discharge.  On day of discharge pt was ambulating, voiding spontaneously, tolerating PO, with adequate pain control, and desiring to go home.    Physical Exam:  Temp:  [36.9 °C (98.4 °F)-37.3 °C (99.2 °F)] 36.9 °C (98.4 °F)  Pulse:  [65-84] 68  Resp:  [16] 16  BP: ()/(50-59) 96/57  SpO2:  [95 %-96 %] 96 %  Gen: AAO, NAD  Abd: soft, NT, ND, fundus firm below umiblicus  Ext: NT, trace edema    Current Facility-Administered Medications   Medication Dose    lactated ringers infusion      docusate sodium (COLACE) capsule 100 mg  100 mg    ibuprofen (MOTRIN) tablet 800 mg  800 mg    acetaminophen (TYLENOL) tablet 1,000 mg  1,000 mg    PRN oxytocin (PITOCIN) (20 Units/1000 mL) PRN for excessive uterine bleeding - See Admin Instr   125-999 mL/hr    miSOPROStol (CYTOTEC) tablet 600 mcg  600 mcg    prenatal plus vitamin (STUARTNATAL 1+1) 27-1 MG tablet 1 Tablet  1 Tablet    calcium carbonate (Tums) chewable tab 1,000 mg  1,000 mg    simethicone (Mylicon) chewable tablet 125 mg  125 mg    oxyCODONE immediate-release (ROXICODONE) tablet 5 mg  5 mg    oxytocin (Pitocin) infusion (for post delivery)  125 mL/hr       Recent Labs     04/18/23  2220 04/19/23  1515   WBC 6.9 9.6   RBC 4.26 3.93*   HEMOGLOBIN 11.9* 10.9*   HEMATOCRIT 34.3* 31.7*   MCV 80.5* 80.7*   MCH 27.9 27.7   MCHC 34.7 34.4   RDW 37.5 37.3   PLATELETCT 269 252   MPV 10.2 10.1         Activity/ Discharge Instructions::   Discharge to home  Pelvic Rest x 6 weeks  No heavy lifting x4 weeks  Call or come to ED for: heavy vaginal bleeding, fever >100.4, severe abdominal pain, severe headache, chest pain, shortness of breath,  N/V, abnormal vaginal discharge, or other concerns.       Follow up:   5-6wks for PP visit w/ Dr. Carrasco    Discharge Meds:      Medication List        START taking these medications        Instructions   docusate sodium 100 MG Caps   Take 100 mg by mouth 2 times a day as needed for Constipation.  Dose: 100 mg     ibuprofen 800 MG Tabs  Commonly known as: MOTRIN   Take 1 Tablet by mouth every 8 hours as needed for Moderate Pain.  Dose: 800 mg            CONTINUE taking these medications        Instructions   PRENATAL PO   Take  by mouth.            STOP taking these medications      aspirin EC 81 MG Tbec  Commonly known as: ECOTRIN                 Aggie Najera MD

## 2023-04-20 NOTE — CARE PLAN
The patient is Stable - Low risk of patient condition declining or worsening    Shift Goals  Clinical Goals: Patient will maintain stable VS; Lochia WDL; Pain WDL  Patient Goals: Healthy baby and delivery.    Progress made toward(s) clinical / shift goals:    Problem: Knowledge Deficit - Postpartum  Goal: Patient will verbalize and demonstrate understanding of self and infant care  Outcome: Progressing     Problem: Psychosocial - Postpartum  Goal: Patient will verbalize and demonstrate effective bonding and parenting behavior  Outcome: Progressing     Problem: Altered Physiologic Condition  Goal: Patient physiologically stable as evidenced by normal lochia, palpable uterine involution and vitals within normal limits  Outcome: Progressing     Problem: Infection - Postpartum  Goal: Postpartum patient will be free of signs and symptoms of infection  Outcome: Progressing       Patient is not progressing towards the following goals:

## 2023-04-20 NOTE — DISCHARGE INSTRUCTIONS

## 2023-04-20 NOTE — CARE PLAN
The patient is Stable - Low risk of patient condition declining or worsening    Shift Goals  Clinical Goals: Patient will maintain stabel VS, Lochia WDL, Pain WDL  Patient Goals: Healthy baby and delivery.    Progress made toward(s) clinical / shift goals:   Problem: Psychosocial - Postpartum  Goal: Patient will verbalize and demonstrate effective bonding and parenting behavior  Note: MOB bonding well with infant, verbalizes and appears comfortable with  care.     Problem: Altered Physiologic Condition  Goal: Patient physiologically stable as evidenced by normal lochia, palpable uterine involution and vitals within normal limits  Note: VSS during shift, scant to moderate lochia, uterus firm.       Patient is not progressing towards the following goals:

## 2023-04-20 NOTE — DISCHARGE PLANNING
Meds-to-Beds: Discharge prescription orders listed below delivered to patient's bedside. RN Kristopher notified. Patient counseled. Patient elected to have co-payment billed to patient account.     Patient declined docusate as she has available at home.      Current Outpatient Medications   Medication Sig Dispense Refill    ibuprofen (MOTRIN) 800 MG Tab Take 1 Tablet by mouth every 8 hours as needed for Moderate Pain. 30 Tablet 0      Gely Munoz, PharmD

## 2023-04-20 NOTE — PROGRESS NOTES
Patient awake and alert, FOB at bedside all night. Rated pain as 3, asked for pain med for first time this shift. Heplock D/C'd. Able to get in and out of bed without problem. Understands plan of care.

## 2023-04-20 NOTE — LACTATION NOTE
Initial Lactation Visit:    Attempted to meet with patient. She and infant are sleeping at this time.

## 2023-06-06 ENCOUNTER — OFFICE VISIT (OUTPATIENT)
Dept: OBGYN | Facility: CLINIC | Age: 38
End: 2023-06-06
Payer: COMMERCIAL

## 2023-06-06 DIAGNOSIS — O92.70 LACTATION PROBLEM: ICD-10-CM

## 2023-06-06 PROCEDURE — 99215 OFFICE O/P EST HI 40 MIN: CPT | Performed by: NURSE PRACTITIONER

## 2023-06-06 NOTE — PROGRESS NOTES
Summary:  first baby, born at 36 weeks,  through 20 months, he has a history of eczema and EOE. Janene  well initially but over the weeks has become increasingly uncomfortable requiring many strategies for calming her. Inlaws are here for 4 more weeks, primarily caring for her son who she so misses interaction with but baby will not take a bottle and is fussy often.  Today: Crying baby, mom offered breast, baby removed 2.5oz from the left and additional 1.0oz from the right, I held her and swayed as we further discussed many alternatives.   Plan: Provided with buffet of ideas, many she has considered, some implemented.  Follow up:   Lactation appointment: As needed  Baby 's Provider appointment: 2 Month Well Child Check   Referrals:  Amarilis Delcid RD, Milka Montoya/infant     Maternal Diagnosis/Problem:  Lactation Problem  Infant Diagnosis/Problem:  Symptoms of GERD and Fussy Baby    Subjective:     Mini Howe is a 37 y.o. female here for lactation care. She is here today with baby.    Concerns:   Maternal: Latch on difficulties , Infant feeding evaluation, and Breastfeeding questions   Infant: Baby cries excessively, Baby always seems hungry, Baby with reflux symptoms, and Baby with colic symptoms    HPI:   Pertinent  history:     Mother does not have a history of hypertension prior to pregnancy, GHTN, insulin resistance, multiple gestation, PCOS, thyroid disease, auto immune disease , placenta encapsulation, and breast surgery    Mother does/did have advanced maternal age and GDM. Common condition(s) that may interfere in milk supply.      FEEDING HISTORY:    Previous Breastfeeding History:   first baby, born at 36 weeks,  through 20 months, he has a history of eczema and EOE.   Currently 2023: Janene  well initially but over the weeks has become increasingly uncomfortable requiring many strategies for calming her. Inlaws are here for 4 more  weeks, primarily caring for her son who she so misses interaction with but baby will not take a bottle and is fussy often.     Both breasts: Yes but not all the time    Supplement: None     Nipple Shield Use: None    Breast Pumping:  Not Pumping     Maternal ROS:  Constitutional: No fever, chills. Feeling well  Breasts: No soreness of breasts and No soreness of nipples  Psychiatric: Managing ok  Mental Health: No mention of feeling irritable, agitated, angry, overwhelmed, apathetic, appetite changes, exhausted nor having sleep changes outside infant feeds/demands.  Current Outpatient Medications on File Prior to Visit   Medication Sig Dispense Refill    ibuprofen (MOTRIN) 800 MG Tab Take 1 Tablet by mouth every 8 hours as needed for Moderate Pain. 30 Tablet 0    docusate sodium 100 MG Cap Take 100 mg by mouth 2 times a day as needed for Constipation. 60 Capsule 0    Prenatal Vit-Fe Fumarate-FA (PRENATAL PO) Take  by mouth.       No current facility-administered medications on file prior to visit.      History reviewed. No pertinent past medical history.      Objective:     Maternal Physical Lactation Exam  General: No acute distress  Breasts: Symmetrical , Soft, Plugged Duct - no evidence, and Mastitis  - no S/S  Nipples: intact  Psychiatric: Normal mood and affect, concerned.Her behavior is normal. Judgment and thought content normal.  Mental Health: Did NOT exhibit sadness, crying, feeling overwhelmed, agitation or hypervigilance. Tearful    Assessment/Plan & Lactation Counseling:     Infant Exam on Infant Chart    Infant Weight History:   4/19/23 7#9.7oz  4/21/23 6#11.3oz  6/5/23 10#11.6oz  6/5/2023 10#11.6oz  06/06/2023: 10#15.6oz WITH CLOTHES, had just been weighed yesterday    Infant Intake at Breast: 2.5oz left     right 1.0oz    Total: 3.5oz  Milk Transfer at this feeding:   Effective breastfeeding     Pumped: Not indicated   Initiation of Feeding: Infant initiates  Position of Feeding:    Right: cross  cradle  Left: cross cradle  Attachment Achieved: rapidly  Nipple shield: N/A       Suck Pattern at the Breast: Suck burst and normal rest  Suck Pattern on the Bottle: Not Indicated     Behavior Following Observed Feeding: content  Nipple Pain: None     Latch: Mom latches independently  Suckling/Feeding: attaches, audible swallows, baby fed effectively, baby roots, elicits ROSALIA, and rhythmic.Did cough on the left side but didn't even unlatch, paused and moved on sucking  Sucking strength: Moderate Strong  Sucking Rhythm Coordinated   Compression: WNL    Once latched, baby fell into a mature and fully integrated SSB pattern.    Swallowing No difficulty noted  If functional feeding, it is quiet, rhythmic, coordinated, organized, effeicent safe, satisfying and pleasurable for both parent and baby? Yes while breastfeeding but not after always   Milk Supply Available: normal +    MATERNAL PERSONALIZED BREASTFEEDING PLAN  Discussed concerns and symptoms as listed above in assessment and guidance summarized below. Shared decision making was used between myself and the family for this encounter, home care, and follow up. Topics reviewed included:   4th Trimester: The 12-week period immediately after mom has had the baby. Not everyone has heard of it, but every mother and their  baby will go through it. It is a time of great physical and emotional change as the baby adjusts to being outside the womb, and the family adjusts to new life as parents  During the fourth trimester, one can expect fussiness and crying from the baby and very likely exhaustion for the family.  babies are learning to adjust to life outside the womb where it was warm and squishy!  There is a lot of misinformation about babies and their needs, and parents are often encouraged to ignore baby's signals. Bad idea. Babies are “half-baked” at birth and have much to learn with the help of physical and emotional support from caregivers. Taking care  "of a baby's needs is an investment that pays off with a happier, healthier child and adult.  It can take weeks or even months for your body to feel totally normal again. There is a major hormonal upheaval experienced by moms in the first few weeks after birth, because their bodies are shifting from many pregnancy hormones to a more normal hormonal make-up.  These first three months with baby earth side is a delicate time. Honor it with a mindful dose of support. Mindful Mamma's is an steven that may help.   Self-Compassion through Relational Support and Interaction.   Be kind to ourself. This is the first step in reducing anxiety and depression. Pay attention to how you are doing.   What do you need? Food, Rest, Sleep, Support, to Laugh/giggle: who will you ask today? They want to help you. We want to help you.   How do you stop your self-blame, or your self-criticism?   Get out of the house each day, walk or drive somewhere or ask a friend to drive you,  a \"treat\" at a drive through.  Sleep or Lack of: Human Giver Syndrome (moms) tells us it’s “self-indulgent” to rest and sleep, which makes as much sense as believing it’s weak or self indulgent to breathe. We discussed strategies to manage restorative sleep, although short amounts, significant to the mental health of the mother. The principle follows:  Mom goes to sleep right after 8pm +/- feeding  Partner covers first late evening feeding (10pm/11pm), settles baby,  then goes to bed  Mom covers next feeding 1am /2am, partner sleeps  Next feeding share  Breastsleeping (.breastsleeping) Discussed and referred to Academy of Breastfeeding Medicine protocol on safe sleeping 2019 which has discussion and counseling. The triple risk model proposes that SIDS occurs when an infant with intrinsic vulnerability (often manifested by impaired arousal, cardiorespiratory, and/or autonomic responses) undergoes an exogenous trigger event (eg, exposure to an unsafe reactive " sleeping environment) during a critical developmental period. The American Academy of Pediatrics recommends a safe sleep environment to reduce the risk of all sleep-related deaths as discussed  Milk Supply is dependent on glandular tissue development, hormonal influences, how many times the baby removes milk and how well the breasts are emptied in a 24 hour period. This is a biological reality that we can NOT work around. If, for any reason, your baby is not latching, or you are not able to nurse, then it is important for you to remove the milk instead by pumping or hand expression.  There's no magic trick, tea, food, drink, cookie or supplement that will increase your milk supply. One  must  effectively remove milk to continue to make and maximize milk. In the early days and weeks that can be 8+ times in 24 hours. For older babies, on average 6-7 + times in 24 hours.    Hydration: Staying hydrated is important however lack of hydration is usually not a cause of significantly low milk production.  Everyone needs a different amount of water, depending on their activity and diet. A high salt and/or high-protein diet, high physical activity, or very warm weather/sweating will require more fluids. A person eating a diet high in veggies and fruit, with a lack of physical activity will require fewer fluids. There is no magic number for the # of ounces of water each day.The best way to know that you are well hydrated is by looking at your urine.  Urine should look clear to light pale yellow, and you should need to pee at least every 3 to 4 hours unless you have a large bladder capacity.  Feeding:   Infant feeding well given current interval growth, guidelines to follow:  Continue your feeding pattern   Supplement:   No supplement is needed  COLIC OR GERD  Considered  Functional GI distress/dysfunctions  Physiologic: immaturity, dysmotility, dysbiosis, gut hormones, food sensitivities  Behavorial: Parent infant interaction,  parental anxiety,  infant temperament  Environmental and Genetic predisposition: Linda/fecal changes, parental health and dietary factors, changes in environment, birth, feeding practices  Reflux (NATALIE not GERD)makes sense- so many of the bioactive components in breastmilk stun/kill pathogens at the mucosal surface  - oligosaccharides, lactoferrin, IgA to name a few. So it makes sense to upchuck milk into the eustachian tubes, throat, nose, and lungs to coat the mucosal surfaces, where the pathogens enter the body. It makes no sense to keep all of the bioactive factors in the stomach since that would allow pathogens to get into the sinuses, middle ears, brain, lungs. Reflux helps supply the immmunologic soldiers at the entrance to the baby’s fortress!!  There is a whole population of people (infants) who spit up frequently, someone is bound to feel sick from it, as it is a physiologic process that may go awry in some people. Hence there are babies who need acid reducers because they lose weight, cannot sleep on their backs due to pain and nausea, etc.  The only food in the maternal diet that has been associated with NATALIE is dairy, so if a baby is miserable or not gaining weight due to GERD symptoms, and the lactating parent eats a significant amount of dairy, cutting it down is a good suggestion to see if it helps.. Because gastroesophageal reflux is essentially normal and expected in infants, I see no reason for a strict dairy elimination. Here is a good review on the connection between NATALIE and dairy- https://pubmed.ncbi.nlm.nih.gov/99799184/    If one wishes to proceed with food elimination - please work with an RD.  Dairy, corn and eggs are the higher offenders  As well as anything out of moderation.   If there is allergic proctocolitis caused by cows milk protein, then a strict elimination is needed to stop the blood streaked stools.  I have not seen any clinical effectiveness in eliminating all sorts of other  foods for control of NATALIE, unless the mom is taking a substance that might be exacerbating it- common ones from my experience include fenugreek, moringa, caffeine, or chronic antibiotics.  NATALIE can also be worse when the mom is overproducing milk, when bottle feeding without pacing, overfeeding, putting pressure on the abdomen while holding (such as when the baby is facing outward), and/or a tight diaper.  From my experience, NATALIE is at its peak between 2-4 months, and then it wanes, unless there is a strong genetic component, like a parent with terrible NATALIE.  Thickening breastmilk does not work, since the enzymes in breastmilk break down the starches in most thickeners.  NATALIE  45-80% 0-m old have NATALIE  8-10% GERD  Develop 1-2 m  Peak 4-6m  Decline 8-18m    WHY  LES hypotonia  Transient relaxation  Swallow patterns  Increase intragastric pressure  Tight diaper  Food allergies 16-56%  Vagal dysfunction     Symptoms:   Infant in pain at breast and consoled with movement and sucking.   Can be unconsolable  Fussiness is painful for baby  Small spitting up - silent reflux.    Recommend:   Education, support  Smaller feedings, break then finish (Unpaired feeding)  Position  Not aggressive burping  Slow handling  Pacifiers  Journal maternal foods, need 2-3 days due to food 1/2 life  Keep a rolled and aligned midline positioning when breastfeeding  Bend baby's knees when the baby is being held  Put the baby on their tummy when awake  When the baby is awake flex and extend the baby's legs simultaneoully.  Hold and gently rock the baby  Frequent postural changes  Alternate the position of the baby in the crib by turning the head once to each side  Keep a rolled and aligned midline positioning when breastfeeding  Bend the baby knees when the baby is being held  Upright after feeding, flex and extend the baby's legs simultaneously,  Gently rock the baby    Theoretical foundation of CST (Cranial Sacral Therapy) based on the fact that  compression of the vagus nerve can cause somatic  (body as opposed to the mind) symptoms such as excessive crying.  Irritation of nerves 9 and 10 caused by a jugular foramen compression which may result in GI issues, and swallowing disturbances. and impact higher cognitive functions like alertness sleep and emotions.  DOs have much training as well as CSTs.   Vagal nerve support connection the ENS and CNS = singing, hummig, brief cold air, accupressure, S2S, face to face, decrease stress, Chiropractic care, baby maasages, touch, sesnsory stimulation, The compression of the vagus nerve can cause somatic symptoms such as excessive crying and irritation of nerves IX and X caused by a jugular foramen compression may result in GI issues and swallowing disturbance including feeding difficulties for the baby.   Consider:   Ashwini Mitchell at https://Onehub/about or   Milka Montoya [email or web address]/about-us/our-doctors/mo-lrtuqrtt-zxsfgw/  Hyperlactation  May also induce GERD with volume of milk.   Mom is a good   Probiotics for Microbiome dysbiosis -  My theory is that many strains of probiotics help the biome overall which aids motility, reduces inflammations and helps digestion which in turn reduce reflux.  BioGaia, Dwayne Soothe, and  Culturelle (their colic and soothing drops) (probiotics) with L. Reuteri to address GERD  Parental probiotics found in fermented foods  Positioning  Upright after feedings.  Left side when awake  Colic hold  Infant fussiness  Exercise ball to move and calm infant  Dysregulation of infant can be helped with movement.   Cows Milk Allergy   There are no other symptoms in the infant to suggest this but there is a strong history of her prior son doing better on alimentum formula. and so breastfeeding was discontinued.  Would trial 2 weeks of  cows milk elimination. This is NOT easy. Suggest Paleo diet to guide elimination of cows milk.   Over the Counter below  are not contraindicated, they help by soothing the swallow after burping up stomach contents  Colic Calm  Gripe Water  Simethicone drops  Camomile tea, fennel, tad, peppermint tea 5ml  Slippery elm  Lower GI symptoms, gas abdominal bloating   EVIVO is for lower GI symptoms.  Medication risks include increase in allergies, decrease effectiveness,bacterial overgrowth, decrease bone density, Gi/Resp infections, Eczema, quells CCK a neurotransmitter    Recommended resources Physicians guide to breastfeeding, must up to date and accurate information available on breastfeeding  [email or web address]  Safe Infant Sleep, Dr. Charles Cline ()  Food allergy  considerations, will send the website     Connect with other mothers:  Facebook:   [place] [facility]: [email or web address].[email or web address]  Well-Nourished Babies (Private group for questions and support): [email or web address]  Breastfeeding [facility] including opportunity to weight your baby and do before and after weights WEIGHT CHECKS  Tuesdays 10am - 11am. [facility] at [address] [place] [address], [facility] [address] [place] [address], 3rd floor conference room  Check your baby's weight, do a feeding and see how your baby is growing, visit with other mothers, plan on a walk or coffee date after group.  Please download Growth: Baby and Child for Apple or Child Growth Tracker for byUss if you would like to  document and follow your baby's growth curve.  Due to space limitations - limit strollers please (New c/section moms please use your stroller).  We would love to have dads stay, but moms won't breastfeed if there are men in the room, sorry.  The room is generally scheduled for another event following group.  Please take all diapers with you   ONLINE SUPPORT GROUPS  [employer]partum [employer] (PSI) support groups are conducted using a [id number] support model and are not intended for those experiencing a mental health  crisis. Groups are 90 minutes (1.5 hours) in length. The first ~30 minutes is providing information, education, and establishing group guidelines. The next ~60 minutes is “talk time,” in which group members share and talk with each other. Group members must be present for the group guidelines before joining in the discussion or “talk time.”    In Conclusion:   Managing breastfeeding and milk supply is very dynamic,can be a complex and intimate journey, and is not one size fits all. When obstacles present themselves, it takes confidence, persistence and support. The rights of the child include optimal nutrition and mothers need help to make informed decisions. You  and your baby have been screened for biological, psychological, and social risk factors that might interfere with achieving your goals.  Support is critical. We want the family thriving not just tolerating life. You are now the focus of our Breastfeeding Medicine team; we are here to support your decisions and vision.     Follow up    Please call [phone] our voicemail line or the front office at [phone] to scheduled your next appointment.  Family is encouraged to e-mail or mychart us to update how the plan is working.    A total of 75 minutes, not including infant assessment time,  was spent preparing to see the patient, obtaining and reviewing separately obtained history, performing a medically appropriate examination and evaluation, counseling and educating the family,referring and communicating with other health care professionals, documenting clinical information in the electronic health record, independently interpreting weighted feeds and infant growth results, communicating these results to the family and care coordination as detailed in the above note.       ALFREDO Phillips.

## 2023-09-04 SDOH — ECONOMIC STABILITY: FOOD INSECURITY: WITHIN THE PAST 12 MONTHS, THE FOOD YOU BOUGHT JUST DIDN'T LAST AND YOU DIDN'T HAVE MONEY TO GET MORE.: NEVER TRUE

## 2023-09-04 SDOH — ECONOMIC STABILITY: INCOME INSECURITY: IN THE LAST 12 MONTHS, WAS THERE A TIME WHEN YOU WERE NOT ABLE TO PAY THE MORTGAGE OR RENT ON TIME?: NO

## 2023-09-04 SDOH — ECONOMIC STABILITY: FOOD INSECURITY: WITHIN THE PAST 12 MONTHS, YOU WORRIED THAT YOUR FOOD WOULD RUN OUT BEFORE YOU GOT MONEY TO BUY MORE.: NEVER TRUE

## 2023-09-04 SDOH — ECONOMIC STABILITY: HOUSING INSECURITY: IN THE LAST 12 MONTHS, HOW MANY PLACES HAVE YOU LIVED?: 1

## 2023-09-04 SDOH — HEALTH STABILITY: PHYSICAL HEALTH: ON AVERAGE, HOW MANY MINUTES DO YOU ENGAGE IN EXERCISE AT THIS LEVEL?: 40 MIN

## 2023-09-04 SDOH — ECONOMIC STABILITY: INCOME INSECURITY: HOW HARD IS IT FOR YOU TO PAY FOR THE VERY BASICS LIKE FOOD, HOUSING, MEDICAL CARE, AND HEATING?: NOT HARD AT ALL

## 2023-09-04 SDOH — HEALTH STABILITY: PHYSICAL HEALTH: ON AVERAGE, HOW MANY DAYS PER WEEK DO YOU ENGAGE IN MODERATE TO STRENUOUS EXERCISE (LIKE A BRISK WALK)?: 3 DAYS

## 2023-09-04 ASSESSMENT — SOCIAL DETERMINANTS OF HEALTH (SDOH)
HOW OFTEN DO YOU HAVE SIX OR MORE DRINKS ON ONE OCCASION: NEVER
HOW OFTEN DO YOU HAVE A DRINK CONTAINING ALCOHOL: NEVER
HOW OFTEN DO YOU ATTEND CHURCH OR RELIGIOUS SERVICES?: NEVER
HOW MANY DRINKS CONTAINING ALCOHOL DO YOU HAVE ON A TYPICAL DAY WHEN YOU ARE DRINKING: PATIENT DOES NOT DRINK
HOW OFTEN DO YOU GET TOGETHER WITH FRIENDS OR RELATIVES?: TWICE A WEEK
HOW HARD IS IT FOR YOU TO PAY FOR THE VERY BASICS LIKE FOOD, HOUSING, MEDICAL CARE, AND HEATING?: NOT HARD AT ALL
HOW OFTEN DO YOU GET TOGETHER WITH FRIENDS OR RELATIVES?: TWICE A WEEK
IN A TYPICAL WEEK, HOW MANY TIMES DO YOU TALK ON THE PHONE WITH FAMILY, FRIENDS, OR NEIGHBORS?: PATIENT DECLINED
DO YOU BELONG TO ANY CLUBS OR ORGANIZATIONS SUCH AS CHURCH GROUPS UNIONS, FRATERNAL OR ATHLETIC GROUPS, OR SCHOOL GROUPS?: NO
HOW OFTEN DO YOU ATTENT MEETINGS OF THE CLUB OR ORGANIZATION YOU BELONG TO?: NEVER
HOW OFTEN DO YOU ATTENT MEETINGS OF THE CLUB OR ORGANIZATION YOU BELONG TO?: NEVER
IN A TYPICAL WEEK, HOW MANY TIMES DO YOU TALK ON THE PHONE WITH FAMILY, FRIENDS, OR NEIGHBORS?: PATIENT DECLINED
HOW OFTEN DO YOU ATTEND CHURCH OR RELIGIOUS SERVICES?: NEVER
WITHIN THE PAST 12 MONTHS, YOU WORRIED THAT YOUR FOOD WOULD RUN OUT BEFORE YOU GOT THE MONEY TO BUY MORE: NEVER TRUE
DO YOU BELONG TO ANY CLUBS OR ORGANIZATIONS SUCH AS CHURCH GROUPS UNIONS, FRATERNAL OR ATHLETIC GROUPS, OR SCHOOL GROUPS?: NO

## 2023-09-04 ASSESSMENT — LIFESTYLE VARIABLES
HOW OFTEN DO YOU HAVE SIX OR MORE DRINKS ON ONE OCCASION: NEVER
HOW MANY STANDARD DRINKS CONTAINING ALCOHOL DO YOU HAVE ON A TYPICAL DAY: PATIENT DOES NOT DRINK
AUDIT-C TOTAL SCORE: 0
SKIP TO QUESTIONS 9-10: 1
HOW OFTEN DO YOU HAVE A DRINK CONTAINING ALCOHOL: NEVER

## 2023-09-07 ENCOUNTER — OFFICE VISIT (OUTPATIENT)
Dept: MEDICAL GROUP | Facility: MEDICAL CENTER | Age: 38
End: 2023-09-07
Payer: COMMERCIAL

## 2023-09-07 VITALS
WEIGHT: 168 LBS | DIASTOLIC BLOOD PRESSURE: 64 MMHG | HEIGHT: 69 IN | OXYGEN SATURATION: 97 % | TEMPERATURE: 97.9 F | HEART RATE: 61 BPM | SYSTOLIC BLOOD PRESSURE: 112 MMHG | BODY MASS INDEX: 24.88 KG/M2

## 2023-09-07 DIAGNOSIS — Z00.00 ENCOUNTER FOR PREVENTATIVE ADULT HEALTH CARE EXAMINATION: Primary | ICD-10-CM

## 2023-09-07 DIAGNOSIS — R53.83 FATIGUE, UNSPECIFIED TYPE: ICD-10-CM

## 2023-09-07 PROCEDURE — 3074F SYST BP LT 130 MM HG: CPT | Performed by: FAMILY MEDICINE

## 2023-09-07 PROCEDURE — 99395 PREV VISIT EST AGE 18-39: CPT | Performed by: FAMILY MEDICINE

## 2023-09-07 PROCEDURE — 3078F DIAST BP <80 MM HG: CPT | Performed by: FAMILY MEDICINE

## 2023-09-07 ASSESSMENT — FIBROSIS 4 INDEX: FIB4 SCORE: 0.39

## 2023-09-07 NOTE — ASSESSMENT & PLAN NOTE
Anticipatory guidance:  --Discussed moderation in sodium/caffeine intake, saturated fat and cholesterol, caloric balance, sufficient fresh fruits/vegetables, fiber, iron, and 0.4-0.8mg of folate supplement per day (for females capable of pregnancy).  --Discussed brushing, flossing, and dental visits.   --Encouraged regular exercise, 150 mins a week of moderate to high intensity cardiovascular activity.   --Discussed tobacco, alcohol, or other drug use; availability of treatment for abuse.   --Injury prevention: Discussed safety belts, safety helmets, smoke detector, etc.  - Discussed vaccinations but they are due for  -Discussed  breast self exam, mammography screening, diet and exercise     Health maintenance: Due for hepatitis C screening and influenza vaccine, influenza vaccine is unavailable in clinic today.  Immunizations per orders  Patient counseled about skin care, diet, supplements, and exercise.  Labs per orders

## 2023-09-07 NOTE — PROGRESS NOTES
Subjective:     CC:   Chief Complaint   Patient presents with    Annual Exam       HPI:   Mini Howe is a 37 y.o. female who presents for annual exam     Problem   Encounter for Preventative Adult Health Care Examination    Cancer screening  Colorectal Cancer Screening: Due at 44 years    Lung Cancer Screening: Non-smoker    Breast Cancer Screening: Due at 40 years   Hep C screening: Due    Ob-Gyn/ History:    Patient has GYN provider: yes  /Para:   Last Pap Smear: 2022. No history of abnormal pap smears.  Gyn Surgery: None.  LMP: 2022  Hx STDs: No  Birth control: None  Menses every month with 28 to 30 days with moderate, heavy bleeding.  Reports mild cramping and does not take OTC analgesics for cramps.  No significant bloating/fluid retention, pelvic pain, or dyspareunia. No abnormal vaginal discharge.  No breast tenderness, mass, nipple discharge, changes in size or contour, or abnormal cyclic discomfort.  Urinary incontinence: Stress incontinence  Folate intake: Taking prenatal     Health Maintenance  Advanced directive: None   Osteoporosis Screen/ DEXA: Due at 64 years   PT/vit D for falls prevention: None  Cholesterol Screenin2022 LDL 67 HDL 66   Diabetes Screenin2022 glucose 90   Aspirin Use: None     Diet: Limited dairy, otherwise regular   Exercise: Jogging 2 times a week and yoga once a week, walking when she can    Substance Abuse: No concerns  Safe in relationship.   Seat belts, bike helmet, gun safety discussed.  Sun protection used.  Routine Dental: Daily brushing and follows with a dentist    Immunizations  Influenza: Due   HPV series: Yes   Tetanus: 2023    Shingles: Due at 49 yrs   COVID: Up to date  Pneumococcal : No risk factors, Due at 64 yrs    Hep B: Completed   Other immunizations: None     Fatigue    Reports a few years ago with some fatigue, feels this may be due to her going to school at that time.  Her son was recently DX with 3rd degree  AV block which is a common finding in child born of mother's with lupus.  Requesting testing to check for possible lupus locate possible source for this signs abnormal heartbeat.  Denies any significant fatigue symptoms, no rashes that wrapped in the sunlight.  No blood noticed in the urine.  Previous labs have all been normal.  She was tested for DEN during pregnancy, she was considered high risk and everything came back normal.          OB History    Para Term  AB Living   2 2 1 1 0 2   SAB IAB Ectopic Molar Multiple Live Births   0 0 0 0 0 2      She  reports being sexually active and has had partner(s) who are male. She reports using the following method of birth control/protection: None.    She  has no past medical history on file.  She  has a past surgical history that includes foot surgery (Left).    Family History   Problem Relation Age of Onset    No Known Problems Mother     Cancer Father 64        brain cancer    No Known Problems Brother     Breast Cancer Maternal Aunt 42    Diabetes Maternal Grandmother     Alzheimer's Disease Paternal Grandfather      Social History     Tobacco Use    Smoking status: Never    Smokeless tobacco: Never   Vaping Use    Vaping Use: Never used   Substance Use Topics    Alcohol use: Not Currently     Comment: once a month    Drug use: No       Patient Active Problem List    Diagnosis Date Noted    Encounter for preventative adult health care examination 2023    Fatigue 2023    Gestational diabetes 2023    Right ear pain 2022    Elevated serum hCG 2022    Leukopenia 2022    Breast feeding status of mother 2022    Celiac disease 2019    Rosacea 2019     Current Outpatient Medications   Medication Sig Dispense Refill    ibuprofen (MOTRIN) 800 MG Tab Take 1 Tablet by mouth every 8 hours as needed for Moderate Pain. 30 Tablet 0    docusate sodium 100 MG Cap Take 100 mg by mouth 2 times a day as needed for  "Constipation. 60 Capsule 0    Prenatal Vit-Fe Fumarate-FA (PRENATAL PO) Take  by mouth.       No current facility-administered medications for this visit.     Allergies   Allergen Reactions    Gluten Meal         Objective:   /64 (BP Location: Left arm, Patient Position: Sitting, BP Cuff Size: Adult)   Pulse 61   Temp 36.6 °C (97.9 °F) (Temporal)   Ht 1.753 m (5' 9\")   Wt 76.2 kg (168 lb)   SpO2 97%   BMI 24.81 kg/m²     Wt Readings from Last 4 Encounters:   09/07/23 76.2 kg (168 lb)   04/18/23 83.5 kg (184 lb)   03/09/23 82.1 kg (181 lb)   09/10/22 65.2 kg (143 lb 12.8 oz)   =    Physical Exam  Constitutional:       General: She is not in acute distress.  HENT:      Head: Normocephalic and atraumatic.      Right Ear: Tympanic membrane and external ear normal.      Left Ear: Tympanic membrane and external ear normal.      Nose: No nasal deformity.      Mouth/Throat:      Lips: Pink.      Mouth: Mucous membranes are moist.      Pharynx: Oropharynx is clear. Uvula midline. No posterior oropharyngeal erythema.   Eyes:      General: Lids are normal.      Extraocular Movements: Extraocular movements intact.      Conjunctiva/sclera: Conjunctivae normal.      Pupils: Pupils are equal, round, and reactive to light.   Neck:      Trachea: Trachea normal.   Cardiovascular:      Rate and Rhythm: Normal rate and regular rhythm.      Heart sounds: Normal heart sounds. No murmur heard.     No friction rub. No gallop.   Pulmonary:      Effort: Pulmonary effort is normal. No accessory muscle usage.      Breath sounds: Normal breath sounds. No wheezing or rales.   Abdominal:      General: Bowel sounds are normal.      Palpations: Abdomen is soft.      Tenderness: There is no abdominal tenderness.   Musculoskeletal:      Cervical back: Normal range of motion and neck supple.      Right lower leg: No edema.      Left lower leg: No edema.   Lymphadenopathy:      Cervical: No cervical adenopathy.   Skin:     General: Skin is " warm and dry.      Findings: No rash.   Neurological:      General: No focal deficit present.      Mental Status: She is alert and oriented to person, place, and time. Mental status is at baseline.      GCS: GCS eye subscore is 4. GCS verbal subscore is 5. GCS motor subscore is 6.      Motor: No weakness.      Gait: Gait is intact.   Psychiatric:         Attention and Perception: Attention normal.         Mood and Affect: Mood and affect normal.         Speech: Speech normal.         Imaging:  No imaging    Labs:  No updated labs    Assessment and Plan:        Problem List Items Addressed This Visit       Encounter for preventative adult health care examination - Primary     Anticipatory guidance:  --Discussed moderation in sodium/caffeine intake, saturated fat and cholesterol, caloric balance, sufficient fresh fruits/vegetables, fiber, iron, and 0.4-0.8mg of folate supplement per day (for females capable of pregnancy).  --Discussed brushing, flossing, and dental visits.   --Encouraged regular exercise, 150 mins a week of moderate to high intensity cardiovascular activity.   --Discussed tobacco, alcohol, or other drug use; availability of treatment for abuse.   --Injury prevention: Discussed safety belts, safety helmets, smoke detector, etc.  - Discussed vaccinations but they are due for  -Discussed  breast self exam, mammography screening, diet and exercise     Health maintenance: Due for hepatitis C screening and influenza vaccine, influenza vaccine is unavailable in clinic today.  Immunizations per orders  Patient counseled about skin care, diet, supplements, and exercise.  Labs per orders         Fatigue     Chronic, Stable.  We will complete a lupus screening panel as well as an DEN.  Signs and symptoms of what to look out for in terms of autoimmune disorders.         Relevant Orders    DEN W/REFLEX IF POSITIVE    LUPUS COMPREHENSIVE PANEL        Follow-up: Return in about 1 year (around 9/7/2024) for Annual.      Please note that this dictation was created using voice recognition software. I have made every reasonable attempt to correct obvious errors, but I expect that there are errors of grammar and possibly content that I did not discover before finalizing the note.

## 2023-09-07 NOTE — ASSESSMENT & PLAN NOTE
Chronic, Stable.  We will complete a lupus screening panel as well as an DEN.  Signs and symptoms of what to look out for in terms of autoimmune disorders.

## 2023-09-17 ENCOUNTER — APPOINTMENT (OUTPATIENT)
Dept: RADIOLOGY | Facility: IMAGING CENTER | Age: 38
End: 2023-09-17
Attending: PHYSICIAN ASSISTANT
Payer: COMMERCIAL

## 2023-09-17 ENCOUNTER — APPOINTMENT (OUTPATIENT)
Dept: URGENT CARE | Facility: CLINIC | Age: 38
End: 2023-09-17
Payer: COMMERCIAL

## 2023-09-17 ENCOUNTER — OFFICE VISIT (OUTPATIENT)
Dept: URGENT CARE | Facility: CLINIC | Age: 38
End: 2023-09-17
Payer: COMMERCIAL

## 2023-09-17 VITALS
WEIGHT: 168.4 LBS | SYSTOLIC BLOOD PRESSURE: 106 MMHG | RESPIRATION RATE: 12 BRPM | BODY MASS INDEX: 24.94 KG/M2 | HEIGHT: 69 IN | TEMPERATURE: 97.9 F | HEART RATE: 74 BPM | OXYGEN SATURATION: 96 % | DIASTOLIC BLOOD PRESSURE: 60 MMHG

## 2023-09-17 DIAGNOSIS — S92.502A CLOSED FRACTURE OF PHALANX OF LEFT SECOND TOE, INITIAL ENCOUNTER: ICD-10-CM

## 2023-09-17 DIAGNOSIS — S99.922A TOE INJURY, LEFT, INITIAL ENCOUNTER: ICD-10-CM

## 2023-09-17 PROCEDURE — 99214 OFFICE O/P EST MOD 30 MIN: CPT | Performed by: PHYSICIAN ASSISTANT

## 2023-09-17 PROCEDURE — 3078F DIAST BP <80 MM HG: CPT | Performed by: PHYSICIAN ASSISTANT

## 2023-09-17 PROCEDURE — 73660 X-RAY EXAM OF TOE(S): CPT | Mod: TC,FY,LT | Performed by: RADIOLOGY

## 2023-09-17 PROCEDURE — 3074F SYST BP LT 130 MM HG: CPT | Performed by: PHYSICIAN ASSISTANT

## 2023-09-17 ASSESSMENT — ENCOUNTER SYMPTOMS
ARTHRALGIAS: 1
SENSORY CHANGE: 0
JOINT SWELLING: 1
CHILLS: 0
VOMITING: 0
NAUSEA: 0
TINGLING: 0
WEAKNESS: 0
NUMBNESS: 0
FEVER: 0
FOCAL WEAKNESS: 0

## 2023-09-17 ASSESSMENT — FIBROSIS 4 INDEX: FIB4 SCORE: 0.39

## 2023-09-27 ENCOUNTER — HOSPITAL ENCOUNTER (OUTPATIENT)
Dept: LAB | Facility: MEDICAL CENTER | Age: 38
End: 2023-09-27
Attending: FAMILY MEDICINE
Payer: COMMERCIAL

## 2023-09-27 DIAGNOSIS — R53.83 FATIGUE, UNSPECIFIED TYPE: ICD-10-CM

## 2023-09-27 PROCEDURE — 86431 RHEUMATOID FACTOR QUANT: CPT

## 2023-09-27 PROCEDURE — 86160 COMPLEMENT ANTIGEN: CPT

## 2023-09-27 PROCEDURE — 86038 ANTINUCLEAR ANTIBODIES: CPT | Mod: 91

## 2023-09-27 PROCEDURE — 36415 COLL VENOUS BLD VENIPUNCTURE: CPT

## 2023-09-28 LAB
C3 SERPL-MCNC: 118 MG/DL (ref 90–180)
C4 SERPL-MCNC: 20 MG/DL (ref 10–40)
NUCLEAR IGG SER QL IA: NORMAL
RHEUMATOID FACT SER NEPH-ACNC: <10 IU/ML (ref 0–14)

## 2024-02-22 ENCOUNTER — OFFICE VISIT (OUTPATIENT)
Dept: MEDICAL GROUP | Facility: MEDICAL CENTER | Age: 39
End: 2024-02-22
Payer: COMMERCIAL

## 2024-02-22 VITALS
HEART RATE: 79 BPM | OXYGEN SATURATION: 98 % | TEMPERATURE: 97.9 F | HEIGHT: 69 IN | BODY MASS INDEX: 24.88 KG/M2 | DIASTOLIC BLOOD PRESSURE: 64 MMHG | SYSTOLIC BLOOD PRESSURE: 110 MMHG | WEIGHT: 168 LBS

## 2024-02-22 DIAGNOSIS — R21 RASH: ICD-10-CM

## 2024-02-22 DIAGNOSIS — Z13.6 ENCOUNTER FOR LIPID SCREENING FOR CARDIOVASCULAR DISEASE: ICD-10-CM

## 2024-02-22 DIAGNOSIS — Z13.0 SCREENING FOR DEFICIENCY ANEMIA: ICD-10-CM

## 2024-02-22 DIAGNOSIS — Z13.1 SCREENING FOR DIABETES MELLITUS: ICD-10-CM

## 2024-02-22 DIAGNOSIS — Z13.220 ENCOUNTER FOR LIPID SCREENING FOR CARDIOVASCULAR DISEASE: ICD-10-CM

## 2024-02-22 DIAGNOSIS — Z13.29 THYROID DISORDER SCREENING: ICD-10-CM

## 2024-02-22 DIAGNOSIS — R20.0 NUMBNESS: ICD-10-CM

## 2024-02-22 PROCEDURE — 3078F DIAST BP <80 MM HG: CPT | Performed by: FAMILY MEDICINE

## 2024-02-22 PROCEDURE — 99213 OFFICE O/P EST LOW 20 MIN: CPT | Performed by: FAMILY MEDICINE

## 2024-02-22 PROCEDURE — 3074F SYST BP LT 130 MM HG: CPT | Performed by: FAMILY MEDICINE

## 2024-02-22 ASSESSMENT — FIBROSIS 4 INDEX: FIB4 SCORE: 0.4

## 2024-02-22 NOTE — PROGRESS NOTES
Verbal consent was acquired by the patient to use [employer] ambient listening note generation during this visit: Yes    Chief complaint::Diagnoses of Numbness, Rash, Screening for deficiency anemia, Screening for diabetes mellitus, Encounter for lipid screening for cardiovascular disease, and Thyroid disorder screening were pertinent to this visit.    Assessment and Plan:   The following treatment plan was discussed:     Assessment & Plan  1. Numbness in the right toe.  This is a chronic condition that is unstable. I will get an x-ray of her lumbar spine. I will refer her to physical therapy. She will do some gentle stretches and exercises.    2. Rash on arm.  It is a chronic condition. She may be having an eczema vs fungal type flare. She will try topical terbinafine in combination with hydrocortisone cream, which can purchase over-the-counter at 0.25 percent, apply twice a day. She will follow up with dermatology if it does not get better.    3. Health maintenance.  I will order some normal basic labs.    Follow-up  The patient will follow up in 09/2024.  Mini was seen today for numbness.    Diagnoses and all orders for this visit:    Numbness  -     Referral to Physical Therapy  -     DX-LUMBAR SPINE-2 OR 3 VIEWS; Future    Rash    Screening for deficiency anemia  -     CBC WITH DIFFERENTIAL; Future    Screening for diabetes mellitus  -     Comp Metabolic Panel; Future  -     ESTIMATED GFR; Future    Encounter for lipid screening for cardiovascular disease  -     Lipid Profile; Future    Thyroid disorder screening  -     TSH+FREE T4        Followup: Return in about 7 months (around 9/22/2024) for Annual.    Subjective/HPI:     HPI:    Mini Howe is a pleasant 38 y.o. female here for   Chief Complaint   Patient presents with    Numbness     Right foot, first two toes        History of Present Illness  The patient is a 38-year-old female who is here to discuss some numbness to her foot and a rash on  her left arm.    She has numbness in the first 2 digits on her right foot. It started about 4 or 5 months postpartum. At that time, she had started running a lot. She was also holding her baby up all night for weeks while she slept in a recliner. She was nursing her baby and all of a sudden, her foot went numb. It got a little bit better. She was stretching it and shaking it out, but it has never returned to normal. She still feels numbness. A few weeks ago, she started getting shooting pains like sharp needles and on fire in there. A few weeks after it happened, she broke her toe in her left foot. She went to a podiatrist to get that checked out. He thought it was her sciatic nerve pinching somewhere and that it would return to normal once she started sleeping in a bed, but it has not. She does not want to try steroids at this time as she is breastfeeding. She had a slipped disc and did go to physical therapy a few years ago and it helped a lot. She has been doing those exercises, but not as frequently as she was before. She feels stronger in her core, but it is still causing the mass. She is having a hard time losing the baby weight.    Her , who is a psychiatrist, thinks she has ringworm. She tried putting an over-the-counter antifungal cream for about a week, but it did not seem to improve. After her pregnancy, she started to have a circular thing in her armpit. She is not sure if it is related to that. She has a spot on her shoulder and a spot on her wrist. They look the same kind of roughness. They do not itch.    Her regular labs were never completed. She does not have lupus.    Current Medicines (including changes today)  Current Outpatient Medications   Medication Sig Dispense Refill    Prenatal Vit-Fe Fumarate-FA (PRENATAL PO) Take  by mouth.       No current facility-administered medications for this visit.     Past Medical/ Surgical History  She  has no past medical history on file.  She  has a  "past surgical history that includes foot surgery (Left).       Objective:   /64   Pulse 79   Temp 36.6 °C (97.9 °F)   Ht 1.753 m (5' 9\")   Wt 76.2 kg (168 lb)   SpO2 98%  Body mass index is 24.81 kg/m².    Physical exam was made by observation  Physical Exam  Constitutional:       General: She is not in acute distress.  HENT:      Head: Normocephalic and atraumatic.   Eyes:      Conjunctiva/sclera: Conjunctivae normal.      Pupils: Pupils are equal, round, and reactive to light.   Pulmonary:      Effort: Pulmonary effort is normal. No respiratory distress.   Abdominal:      General: There is no distension.   Musculoskeletal:      Cervical back: Normal range of motion and neck supple.      Lumbar back: Normal.        Legs:    Skin:     General: Skin is warm and dry.      Findings: No rash.   Neurological:      Mental Status: She is alert and oriented to person, place, and time.      Gait: Gait is intact.   Psychiatric:         Mood and Affect: Affect normal.          Lab/ Imaging Results:  No labs or imaging to review    Please note that this dictation was created using voice recognition software. I have made every reasonable attempt to correct obvious errors, but I expect that there are errors of grammar and possibly content that I did not discover before finalizing the note.      "

## 2024-02-28 ENCOUNTER — APPOINTMENT (OUTPATIENT)
Dept: RADIOLOGY | Facility: MEDICAL CENTER | Age: 39
End: 2024-02-28
Attending: FAMILY MEDICINE
Payer: COMMERCIAL

## 2024-02-28 DIAGNOSIS — R20.0 NUMBNESS: ICD-10-CM

## 2024-02-28 PROCEDURE — 72100 X-RAY EXAM L-S SPINE 2/3 VWS: CPT

## 2024-02-29 ENCOUNTER — HOSPITAL ENCOUNTER (OUTPATIENT)
Dept: LAB | Facility: MEDICAL CENTER | Age: 39
End: 2024-02-29
Attending: FAMILY MEDICINE
Payer: COMMERCIAL

## 2024-02-29 DIAGNOSIS — Z13.0 SCREENING FOR DEFICIENCY ANEMIA: ICD-10-CM

## 2024-02-29 DIAGNOSIS — Z13.220 ENCOUNTER FOR LIPID SCREENING FOR CARDIOVASCULAR DISEASE: ICD-10-CM

## 2024-02-29 DIAGNOSIS — Z13.6 ENCOUNTER FOR LIPID SCREENING FOR CARDIOVASCULAR DISEASE: ICD-10-CM

## 2024-02-29 DIAGNOSIS — Z13.1 SCREENING FOR DIABETES MELLITUS: ICD-10-CM

## 2024-02-29 LAB
ALBUMIN SERPL BCP-MCNC: 4 G/DL (ref 3.2–4.9)
ALBUMIN/GLOB SERPL: 1.7 G/DL
ALP SERPL-CCNC: 70 U/L (ref 30–99)
ALT SERPL-CCNC: 15 U/L (ref 2–50)
ANION GAP SERPL CALC-SCNC: 11 MMOL/L (ref 7–16)
AST SERPL-CCNC: 20 U/L (ref 12–45)
BASOPHILS # BLD AUTO: 0.9 % (ref 0–1.8)
BASOPHILS # BLD: 0.04 K/UL (ref 0–0.12)
BILIRUB SERPL-MCNC: 0.2 MG/DL (ref 0.1–1.5)
BUN SERPL-MCNC: 10 MG/DL (ref 8–22)
CALCIUM ALBUM COR SERPL-MCNC: 9.1 MG/DL (ref 8.5–10.5)
CALCIUM SERPL-MCNC: 9.1 MG/DL (ref 8.5–10.5)
CHLORIDE SERPL-SCNC: 107 MMOL/L (ref 96–112)
CHOLEST SERPL-MCNC: 158 MG/DL (ref 100–199)
CO2 SERPL-SCNC: 24 MMOL/L (ref 20–33)
CREAT SERPL-MCNC: 0.66 MG/DL (ref 0.5–1.4)
EOSINOPHIL # BLD AUTO: 0.13 K/UL (ref 0–0.51)
EOSINOPHIL NFR BLD: 3 % (ref 0–6.9)
ERYTHROCYTE [DISTWIDTH] IN BLOOD BY AUTOMATED COUNT: 40.8 FL (ref 35.9–50)
GFR SERPLBLD CREATININE-BSD FMLA CKD-EPI: 114 ML/MIN/1.73 M 2
GLOBULIN SER CALC-MCNC: 2.4 G/DL (ref 1.9–3.5)
GLUCOSE SERPL-MCNC: 99 MG/DL (ref 65–99)
HCT VFR BLD AUTO: 44.2 % (ref 37–47)
HDLC SERPL-MCNC: 65 MG/DL
HGB BLD-MCNC: 14.8 G/DL (ref 12–16)
IMM GRANULOCYTES # BLD AUTO: 0.01 K/UL (ref 0–0.11)
IMM GRANULOCYTES NFR BLD AUTO: 0.2 % (ref 0–0.9)
LDLC SERPL CALC-MCNC: 75 MG/DL
LYMPHOCYTES # BLD AUTO: 1.65 K/UL (ref 1–4.8)
LYMPHOCYTES NFR BLD: 37.5 % (ref 22–41)
MCH RBC QN AUTO: 29.2 PG (ref 27–33)
MCHC RBC AUTO-ENTMCNC: 33.5 G/DL (ref 32.2–35.5)
MCV RBC AUTO: 87.4 FL (ref 81.4–97.8)
MONOCYTES # BLD AUTO: 0.45 K/UL (ref 0–0.85)
MONOCYTES NFR BLD AUTO: 10.2 % (ref 0–13.4)
NEUTROPHILS # BLD AUTO: 2.12 K/UL (ref 1.82–7.42)
NEUTROPHILS NFR BLD: 48.2 % (ref 44–72)
NRBC # BLD AUTO: 0 K/UL
NRBC BLD-RTO: 0 /100 WBC (ref 0–0.2)
PLATELET # BLD AUTO: 300 K/UL (ref 164–446)
PMV BLD AUTO: 9.9 FL (ref 9–12.9)
POTASSIUM SERPL-SCNC: 4.1 MMOL/L (ref 3.6–5.5)
PROT SERPL-MCNC: 6.4 G/DL (ref 6–8.2)
RBC # BLD AUTO: 5.06 M/UL (ref 4.2–5.4)
SODIUM SERPL-SCNC: 142 MMOL/L (ref 135–145)
T4 FREE SERPL-MCNC: 1.25 NG/DL (ref 0.93–1.7)
TRIGL SERPL-MCNC: 90 MG/DL (ref 0–149)
TSH SERPL DL<=0.005 MIU/L-ACNC: 0.81 UIU/ML (ref 0.38–5.33)
WBC # BLD AUTO: 4.4 K/UL (ref 4.8–10.8)

## 2024-02-29 PROCEDURE — 80053 COMPREHEN METABOLIC PANEL: CPT

## 2024-02-29 PROCEDURE — 36415 COLL VENOUS BLD VENIPUNCTURE: CPT

## 2024-02-29 PROCEDURE — 84439 ASSAY OF FREE THYROXINE: CPT

## 2024-02-29 PROCEDURE — 85025 COMPLETE CBC W/AUTO DIFF WBC: CPT

## 2024-02-29 PROCEDURE — 80061 LIPID PANEL: CPT

## 2024-02-29 PROCEDURE — 84443 ASSAY THYROID STIM HORMONE: CPT

## 2024-03-01 PROBLEM — R79.89 ELEVATED SERUM HCG: Status: RESOLVED | Noted: 2022-09-06 | Resolved: 2024-03-01

## 2024-03-21 ENCOUNTER — PATIENT MESSAGE (OUTPATIENT)
Dept: MEDICAL GROUP | Facility: MEDICAL CENTER | Age: 39
End: 2024-03-21
Payer: COMMERCIAL

## 2024-03-21 DIAGNOSIS — R20.0 NUMBNESS: ICD-10-CM

## 2024-05-01 ENCOUNTER — APPOINTMENT (OUTPATIENT)
Dept: PHYSICAL THERAPY | Facility: REHABILITATION | Age: 39
End: 2024-05-01
Attending: FAMILY MEDICINE
Payer: COMMERCIAL

## 2024-05-08 ENCOUNTER — APPOINTMENT (OUTPATIENT)
Dept: PHYSICAL THERAPY | Facility: REHABILITATION | Age: 39
End: 2024-05-08
Attending: FAMILY MEDICINE
Payer: COMMERCIAL

## 2024-05-15 ENCOUNTER — APPOINTMENT (OUTPATIENT)
Dept: PHYSICAL THERAPY | Facility: REHABILITATION | Age: 39
End: 2024-05-15
Attending: FAMILY MEDICINE
Payer: COMMERCIAL

## 2024-05-17 ENCOUNTER — APPOINTMENT (RX ONLY)
Dept: URBAN - METROPOLITAN AREA CLINIC 4 | Facility: CLINIC | Age: 39
Setting detail: DERMATOLOGY
End: 2024-05-17

## 2024-05-17 DIAGNOSIS — L30.9 DERMATITIS, UNSPECIFIED: ICD-10-CM

## 2024-05-17 DIAGNOSIS — D22 MELANOCYTIC NEVI: ICD-10-CM

## 2024-05-17 DIAGNOSIS — I78.8 OTHER DISEASES OF CAPILLARIES: ICD-10-CM

## 2024-05-17 DIAGNOSIS — Z71.89 OTHER SPECIFIED COUNSELING: ICD-10-CM

## 2024-05-17 DIAGNOSIS — L81.4 OTHER MELANIN HYPERPIGMENTATION: ICD-10-CM

## 2024-05-17 DIAGNOSIS — L82.1 OTHER SEBORRHEIC KERATOSIS: ICD-10-CM

## 2024-05-17 DIAGNOSIS — D18.0 HEMANGIOMA: ICD-10-CM

## 2024-05-17 DIAGNOSIS — L21.8 OTHER SEBORRHEIC DERMATITIS: ICD-10-CM

## 2024-05-17 DIAGNOSIS — L72.8 OTHER FOLLICULAR CYSTS OF THE SKIN AND SUBCUTANEOUS TISSUE: ICD-10-CM

## 2024-05-17 PROBLEM — D23.5 OTHER BENIGN NEOPLASM OF SKIN OF TRUNK: Status: ACTIVE | Noted: 2024-05-17

## 2024-05-17 PROBLEM — D22.5 MELANOCYTIC NEVI OF TRUNK: Status: ACTIVE | Noted: 2024-05-17

## 2024-05-17 PROBLEM — D18.01 HEMANGIOMA OF SKIN AND SUBCUTANEOUS TISSUE: Status: ACTIVE | Noted: 2024-05-17

## 2024-05-17 PROCEDURE — ? MEDICATION COUNSELING

## 2024-05-17 PROCEDURE — ? ADDITIONAL NOTES

## 2024-05-17 PROCEDURE — ? SUNSCREEN RECOMMENDATIONS

## 2024-05-17 PROCEDURE — ? COUNSELING

## 2024-05-17 PROCEDURE — ? OBSERVATION AND MEASURE

## 2024-05-17 PROCEDURE — 99213 OFFICE O/P EST LOW 20 MIN: CPT

## 2024-05-17 PROCEDURE — ? PRESCRIPTION

## 2024-05-17 RX ORDER — TRIAMCINOLONE ACETONIDE 0.25 MG/G
OINTMENT TOPICAL
Qty: 80 | Refills: 2 | Status: ERX | COMMUNITY
Start: 2024-05-17

## 2024-05-17 RX ADMIN — TRIAMCINOLONE ACETONIDE: 0.25 OINTMENT TOPICAL at 00:00

## 2024-05-17 ASSESSMENT — LOCATION SIMPLE DESCRIPTION DERM
LOCATION SIMPLE: LEFT SUPERIOR EYELID
LOCATION SIMPLE: UPPER BACK
LOCATION SIMPLE: RIGHT UPPER BACK
LOCATION SIMPLE: SCALP
LOCATION SIMPLE: LEFT SHOULDER
LOCATION SIMPLE: RIGHT SHOULDER
LOCATION SIMPLE: LOWER BACK
LOCATION SIMPLE: ABDOMEN
LOCATION SIMPLE: RIGHT SUPERIOR EYELID

## 2024-05-17 ASSESSMENT — LOCATION DETAILED DESCRIPTION DERM
LOCATION DETAILED: RIGHT MID-UPPER BACK
LOCATION DETAILED: RIGHT MEDIAL SUPERIOR EYELID
LOCATION DETAILED: EPIGASTRIC SKIN
LOCATION DETAILED: RIGHT POSTERIOR SHOULDER
LOCATION DETAILED: RIGHT SUPERIOR UPPER BACK
LOCATION DETAILED: LEFT LATERAL SUPERIOR EYELID
LOCATION DETAILED: LEFT POSTERIOR SHOULDER
LOCATION DETAILED: INFERIOR THORACIC SPINE
LOCATION DETAILED: RIGHT LATERAL UPPER BACK
LOCATION DETAILED: SUPERIOR LUMBAR SPINE
LOCATION DETAILED: LEFT SUPERIOR PARIETAL SCALP

## 2024-05-17 ASSESSMENT — LOCATION ZONE DERM
LOCATION ZONE: ARM
LOCATION ZONE: EYELID
LOCATION ZONE: SCALP
LOCATION ZONE: TRUNK

## 2024-05-17 NOTE — PROCEDURE: MEDICATION COUNSELING
Cyclosporine Pregnancy And Lactation Text: This medication is Pregnancy Category C and it isn't know if it is safe during pregnancy. This medication is excreted in breast milk.
Imiquimod Counseling:  I discussed with the patient the risks of imiquimod including but not limited to erythema, scaling, itching, weeping, crusting, and pain.  Patient understands that the inflammatory response to imiquimod is variable from person to person and was educated regarded proper titration schedule.  If flu-like symptoms develop, patient knows to discontinue the medication and contact us.
Olumiant Counseling: I discussed with the patient the risks of Olumiant therapy including but not limited to upper respiratory tract infections, shingles, cold sores, and nausea. Live vaccines should be avoided.  This medication has been linked to serious infections; higher rate of mortality; malignancy and lymphoproliferative disorders; major adverse cardiovascular events; thrombosis; gastrointestinal perforations; neutropenia; lymphopenia; anemia; liver enzyme elevations; and lipid elevations.
Libtayo Pregnancy And Lactation Text: This medication is contraindicated in pregnancy and when breast feeding.
Bimzelx Counseling:  I discussed with the patient the risks of Bimzelx including but not limited to depression, immunosuppression, allergic reactions and infections.  The patient understands that monitoring is required including a PPD at baseline and must alert us or the primary physician if symptoms of infection or other concerning signs are noted.
Mirvaso Pregnancy And Lactation Text: This medication has not been assigned a Pregnancy Risk Category. It is unknown if the medication is excreted in breast milk.
Topical Sulfur Applications Pregnancy And Lactation Text: This medication is Pregnancy Category C and has an unknown safety profile during pregnancy. It is unknown if this topical medication is excreted in breast milk.
Metronidazole Pregnancy And Lactation Text: This medication is Pregnancy Category B and considered safe during pregnancy.  It is also excreted in breast milk.
Acitretin Pregnancy And Lactation Text: This medication is Pregnancy Category X and should not be given to women who are pregnant or may become pregnant in the future. This medication is excreted in breast milk.
Rituxan Pregnancy And Lactation Text: This medication is Pregnancy Category C and it isn't know if it is safe during pregnancy. It is unknown if this medication is excreted in breast milk but similar antibodies are known to be excreted.
Oxybutynin Counseling:  I discussed with the patient the risks of oxybutynin including but not limited to skin rash, drowsiness, dry mouth, difficulty urinating, and blurred vision.
Colchicine Pregnancy And Lactation Text: This medication is Pregnancy Category C and isn't considered safe during pregnancy. It is excreted in breast milk.
5-Fu Pregnancy And Lactation Text: This medication is Pregnancy Category X and contraindicated in pregnancy and in women who may become pregnant. It is unknown if this medication is excreted in breast milk.
Hydroxyzine Counseling: Patient advised that the medication is sedating and not to drive a car after taking this medication.  Patient informed of potential adverse effects including but not limited to dry mouth, urinary retention, and blurry vision.  The patient verbalized understanding of the proper use and possible adverse effects of hydroxyzine.  All of the patient's questions and concerns were addressed.
Cephalexin Counseling: I counseled the patient regarding use of cephalexin as an antibiotic for prophylactic and/or therapeutic purposes. Cephalexin (commonly prescribed under brand name Keflex) is a cephalosporin antibiotic which is active against numerous classes of bacteria, including most skin bacteria. Side effects may include nausea, diarrhea, gastrointestinal upset, rash, hives, yeast infections, and in rare cases, hepatitis, kidney disease, seizures, fever, confusion, neurologic symptoms, and others. Patients with severe allergies to penicillin medications are cautioned that there is about a 10% incidence of cross-reactivity with cephalosporins. When possible, patients with penicillin allergies should use alternatives to cephalosporins for antibiotic therapy.
Topical Clindamycin Counseling: Patient counseled that this medication may cause skin irritation or allergic reactions.  In the event of skin irritation, the patient was advised to reduce the amount of the drug applied or use it less frequently.   The patient verbalized understanding of the proper use and possible adverse effects of clindamycin.  All of the patient's questions and concerns were addressed.
Humira Counseling:  I discussed with the patient the risks of adalimumab including but not limited to myelosuppression, immunosuppression, autoimmune hepatitis, demyelinating diseases, lymphoma, and serious infections.  The patient understands that monitoring is required including a PPD at baseline and must alert us or the primary physician if symptoms of infection or other concerning signs are noted.
Valtrex Counseling: I discussed with the patient the risks of valacyclovir including but not limited to kidney damage, nausea, vomiting and severe allergy.  The patient understands that if the infection seems to be worsening or is not improving, they are to call.
Zyclara Pregnancy And Lactation Text: This medication is Pregnancy Category C. It is unknown if this medication is excreted in breast milk.
Spironolactone Pregnancy And Lactation Text: This medication can cause feminization of the male fetus and should be avoided during pregnancy. The active metabolite is also found in breast milk.
Azelaic Acid Counseling: Patient counseled that medicine may cause skin irritation and to avoid applying near the eyes.  In the event of skin irritation, the patient was advised to reduce the amount of the drug applied or use it less frequently.   The patient verbalized understanding of the proper use and possible adverse effects of azelaic acid.  All of the patient's questions and concerns were addressed.
Tetracycline Pregnancy And Lactation Text: This medication is Pregnancy Category D and not consider safe during pregnancy. It is also excreted in breast milk.
Taltz Pregnancy And Lactation Text: The risk during pregnancy and breastfeeding is uncertain with this medication.
Low Dose Naltrexone Pregnancy And Lactation Text: Naltrexone is pregnancy category C.  There have been no adequate and well-controlled studies in pregnant women.  It should be used in pregnancy only if the potential benefit justifies the potential risk to the fetus.   Limited data indicates that naltrexone is minimally excreted into breastmilk.
Methotrexate Counseling:  Patient counseled regarding adverse effects of methotrexate including but not limited to nausea, vomiting, abnormalities in liver function tests. Patients may develop mouth sores, rash, diarrhea, and abnormalities in blood counts. The patient understands that monitoring is required including LFT's and blood counts.  There is a rare possibility of scarring of the liver and lung problems that can occur when taking methotrexate. Persistent nausea, loss of appetite, pale stools, dark urine, cough, and shortness of breath should be reported immediately. Patient advised to discontinue methotrexate treatment at least three months before attempting to become pregnant.  I discussed the need for folate supplements while taking methotrexate.  These supplements can decrease side effects during methotrexate treatment. The patient verbalized understanding of the proper use and possible adverse effects of methotrexate.  All of the patient's questions and concerns were addressed.
Wartpeel Counseling:  I discussed with the patient the risks of Wartpeel including but not limited to erythema, scaling, itching, weeping, crusting, and pain.
Minocycline Counseling: Patient advised regarding possible photosensitivity and discoloration of the teeth, skin, lips, tongue and gums.  Patient instructed to avoid sunlight, if possible.  When exposed to sunlight, patients should wear protective clothing, sunglasses, and sunscreen.  The patient was instructed to call the office immediately if the following severe adverse effects occur:  hearing changes, easy bruising/bleeding, severe headache, or vision changes.  The patient verbalized understanding of the proper use and possible adverse effects of minocycline.  All of the patient's questions and concerns were addressed.
Bimzelx Pregnancy And Lactation Text: This medication crosses the placenta and the safety is uncertain during pregnancy. It is unknown if this medication is present in breast milk.
Olumiant Pregnancy And Lactation Text: Based on animal studies, Olumiant may cause embryo-fetal harm when administered to pregnant women.  The medication should not be used in pregnancy.  Breastfeeding is not recommended during treatment.
Opzelura Counseling:  I discussed with the patient the risks of Opzelura including but not limited to nasopharngitis, bronchitis, ear infection, eosinophila, hives, diarrhea, folliculitis, tonsillitis, and rhinorrhea.  Taken orally, this medication has been linked to serious infections; higher rate of mortality; malignancy and lymphoproliferative disorders; major adverse cardiovascular events; thrombosis; thrombocytopenia, anemia, and neutropenia; and lipid elevations.
Oxybutynin Pregnancy And Lactation Text: This medication is Pregnancy Category B and is considered safe during pregnancy. It is unknown if it is excreted in breast milk.
Odomzo Counseling- I discussed with the patient the risks of Odomzo including but not limited to nausea, vomiting, diarrhea, constipation, weight loss, changes in the sense of taste, decreased appetite, muscle spasms, and hair loss.  The patient verbalized understanding of the proper use and possible adverse effects of Odomzo.  All of the patient's questions and concerns were addressed.
Siliq Counseling:  I discussed with the patient the risks of Siliq including but not limited to new or worsening depression, suicidal thoughts and behavior, immunosuppression, malignancy, posterior leukoencephalopathy syndrome, and serious infections.  The patient understands that monitoring is required including a PPD at baseline and must alert us or the primary physician if symptoms of infection or other concerning signs are noted. There is also a special program designed to monitor depression which is required with Siliq.
Dapsone Counseling: I discussed with the patient the risks of dapsone including but not limited to hemolytic anemia, agranulocytosis, rashes, methemoglobinemia, kidney failure, peripheral neuropathy, headaches, GI upset, and liver toxicity.  Patients who start dapsone require monitoring including baseline LFTs and weekly CBCs for the first month, then every month thereafter.  The patient verbalized understanding of the proper use and possible adverse effects of dapsone.  All of the patient's questions and concerns were addressed.
Bexarotene Counseling:  I discussed with the patient the risks of bexarotene including but not limited to hair loss, dry lips/skin/eyes, liver abnormalities, hyperlipidemia, pancreatitis, depression/suicidal ideation, photosensitivity, drug rash/allergic reactions, hypothyroidism, anemia, leukopenia, infection, cataracts, and teratogenicity.  Patient understands that they will need regular blood tests to check lipid profile, liver function tests, white blood cell count, thyroid function tests and pregnancy test if applicable.
Cephalexin Pregnancy And Lactation Text: This medication is Pregnancy Category B and considered safe during pregnancy.  It is also excreted in breast milk but can be used safely for shorter doses.
Drysol Counseling:  I discussed with the patient the risks of drysol/aluminum chloride including but not limited to skin rash, itching, irritation, burning.
Hydroxyzine Pregnancy And Lactation Text: This medication is not safe during pregnancy and should not be taken. It is also excreted in breast milk and breast feeding isn't recommended.
Valtrex Pregnancy And Lactation Text: this medication is Pregnancy Category B and is considered safe during pregnancy. This medication is not directly found in breast milk but it's metabolite acyclovir is present.
Humira Pregnancy And Lactation Text: This medication is Pregnancy Category B and is considered safe during pregnancy. It is unknown if this medication is excreted in breast milk.
Methotrexate Pregnancy And Lactation Text: This medication is Pregnancy Category X and is known to cause fetal harm. This medication is excreted in breast milk.
Azelaic Acid Pregnancy And Lactation Text: This medication is considered safe during pregnancy and breast feeding.
Solaraze Counseling:  I discussed with the patient the risks of Solaraze including but not limited to erythema, scaling, itching, weeping, crusting, and pain.
Detail Level: Zone
Opioid Counseling: I discussed with the patient the potential side effects of opioids including but not limited to addiction, altered mental status, and depression. I stressed avoiding alcohol, benzodiazepines, muscle relaxants and sleep aids unless specifically okayed by a physician. The patient verbalized understanding of the proper use and possible adverse effects of opioids. All of the patient's questions and concerns were addressed. They were instructed to flush the remaining pills down the toilet if they did not need them for pain.
Cimzia Counseling:  I discussed with the patient the risks of Cimzia including but not limited to immunosuppression, allergic reactions and infections.  The patient understands that monitoring is required including a PPD at baseline and must alert us or the primary physician if symptoms of infection or other concerning signs are noted.
Odomzo Pregnancy And Lactation Text: This medication is Pregnancy Category X and is absolutely contraindicated during pregnancy. It is unknown if it is excreted in breast milk.
Tremfya Counseling: I discussed with the patient the risks of guselkumab including but not limited to immunosuppression, serious infections, worsening of inflammatory bowel disease and drug reactions.  The patient understands that monitoring is required including a PPD at baseline and must alert us or the primary physician if symptoms of infection or other concerning signs are noted.
Niacinamide Counseling: I recommended taking niacin or niacinamide, also know as vitamin B3, twice daily. Recent evidence suggests that taking vitamin B3 (500 mg twice daily) can reduce the risk of actinic keratoses and non-melanoma skin cancers. Side effects of vitamin B3 include flushing and headache.
Propranolol Counseling:  I discussed with the patient the risks of propranolol including but not limited to low heart rate, low blood pressure, low blood sugar, restlessness and increased cold sensitivity. They should call the office if they experience any of these side effects.
Klisyri Counseling:  I discussed with the patient the risks of Klisyri including but not limited to erythema, scaling, itching, weeping, crusting, and pain.
Rinvoq Counseling: I discussed with the patient the risks of Rinvoq therapy including but not limited to upper respiratory tract infections, shingles, cold sores, bronchitis, nausea, cough, fever, acne, and headache. Live vaccines should be avoided.  This medication has been linked to serious infections; higher rate of mortality; malignancy and lymphoproliferative disorders; major adverse cardiovascular events; thrombosis; thrombocytopenia, anemia, and neutropenia; lipid elevations; liver enzyme elevations; and gastrointestinal perforations.
Dapsone Pregnancy And Lactation Text: This medication is Pregnancy Category C and is not considered safe during pregnancy or breast feeding.
Azathioprine Counseling:  I discussed with the patient the risks of azathioprine including but not limited to myelosuppression, immunosuppression, hepatotoxicity, lymphoma, and infections.  The patient understands that monitoring is required including baseline LFTs, Creatinine, possible TPMP genotyping and weekly CBCs for the first month and then every 2 weeks thereafter.  The patient verbalized understanding of the proper use and possible adverse effects of azathioprine.  All of the patient's questions and concerns were addressed.
Fluconazole Counseling:  Patient counseled regarding adverse effects of fluconazole including but not limited to headache, diarrhea, nausea, upset stomach, liver function test abnormalities, taste disturbance, and stomach pain.  There is a rare possibility of liver failure that can occur when taking fluconazole.  The patient understands that monitoring of LFTs and kidney function test may be required, especially at baseline. The patient verbalized understanding of the proper use and possible adverse effects of fluconazole.  All of the patient's questions and concerns were addressed.
Bexarotene Pregnancy And Lactation Text: This medication is Pregnancy Category X and should not be given to women who are pregnant or may become pregnant. This medication should not be used if you are breast feeding.
Opzelura Pregnancy And Lactation Text: There is insufficient data to evaluate drug-associated risk for major birth defects, miscarriage, or other adverse maternal or fetal outcomes.  There is a pregnancy registry that monitors pregnancy outcomes in pregnant persons exposed to the medication during pregnancy.  It is unknown if this medication is excreted in breast milk.  Do not breastfeed during treatment and for about 4 weeks after the last dose.
Clindamycin Counseling: I counseled the patient regarding use of clindamycin as an antibiotic for prophylactic and/or therapeutic purposes. Clindamycin is active against numerous classes of bacteria, including skin bacteria. Side effects may include nausea, diarrhea, gastrointestinal upset, rash, hives, yeast infections, and in rare cases, colitis.
Topical Ketoconazole Counseling: Patient counseled that this medication may cause skin irritation or allergic reactions.  In the event of skin irritation, the patient was advised to reduce the amount of the drug applied or use it less frequently.   The patient verbalized understanding of the proper use and possible adverse effects of ketoconazole.  All of the patient's questions and concerns were addressed.
Ketoconazole Pregnancy And Lactation Text: This medication is Pregnancy Category C and it isn't know if it is safe during pregnancy. It is also excreted in breast milk and breast feeding isn't recommended.
Hyrimoz Counseling:  I discussed with the patient the risks of adalimumab including but not limited to myelosuppression, immunosuppression, autoimmune hepatitis, demyelinating diseases, lymphoma, and serious infections.  The patient understands that monitoring is required including a PPD at baseline and must alert us or the primary physician if symptoms of infection or other concerning signs are noted.
Benzoyl Peroxide Counseling: Patient counseled that medicine may cause skin irritation and bleach clothing.  In the event of skin irritation, the patient was advised to reduce the amount of the drug applied or use it less frequently.   The patient verbalized understanding of the proper use and possible adverse effects of benzoyl peroxide.  All of the patient's questions and concerns were addressed.
Use Enhanced Medication Counseling?: No
Solaraze Pregnancy And Lactation Text: This medication is Pregnancy Category B and is considered safe. There is some data to suggest avoiding during the third trimester. It is unknown if this medication is excreted in breast milk.
Cimzia Pregnancy And Lactation Text: This medication crosses the placenta but can be considered safe in certain situations. Cimzia may be excreted in breast milk.
Opioid Pregnancy And Lactation Text: These medications can lead to premature delivery and should be avoided during pregnancy. These medications are also present in breast milk in small amounts.
Propranolol Pregnancy And Lactation Text: This medication is Pregnancy Category C and it isn't known if it is safe during pregnancy. It is excreted in breast milk.
Prednisone Counseling:  I discussed with the patient the risks of prolonged use of prednisone including but not limited to weight gain, insomnia, osteoporosis, mood changes, diabetes, susceptibility to infection, glaucoma and high blood pressure.  In cases where prednisone use is prolonged, patients should be monitored with blood pressure checks, serum glucose levels and an eye exam.  Additionally, the patient may need to be placed on GI prophylaxis, PCP prophylaxis, and calcium and vitamin D supplementation and/or a bisphosphonate.  The patient verbalized understanding of the proper use and the possible adverse effects of prednisone.  All of the patient's questions and concerns were addressed.
Niacinamide Pregnancy And Lactation Text: These medications are considered safe during pregnancy.
Albendazole Counseling:  I discussed with the patient the risks of albendazole including but not limited to cytopenia, kidney damage, nausea/vomiting and severe allergy.  The patient understands that this medication is being used in an off-label manner.
Winlevi Counseling:  I discussed with the patient the risks of topical clascoterone including but not limited to erythema, scaling, itching, and stinging. Patient voiced their understanding.
Isotretinoin Counseling: Patient should get monthly blood tests, not donate blood, not drive at night if vision affected, not share medication, and not undergo elective surgery for 6 months after tx completed. Side effects reviewed, pt to contact office should one occur.
Picato Counseling:  I discussed with the patient the risks of Picato including but not limited to erythema, scaling, itching, weeping, crusting, and pain.
Rinvoq Pregnancy And Lactation Text: Based on animal studies, Rinvoq may cause embryo-fetal harm when administered to pregnant women.  The medication should not be used in pregnancy.  Breastfeeding is not recommended during treatment and for 6 days after the last dose.
Quinolones Counseling:  I discussed with the patient the risks of fluoroquinolones including but not limited to GI upset, allergic reaction, drug rash, diarrhea, dizziness, photosensitivity, yeast infections, liver function test abnormalities, tendonitis/tendon rupture.
Gabapentin Counseling: I discussed with the patient the risks of gabapentin including but not limited to dizziness, somnolence, fatigue and ataxia.
Azathioprine Pregnancy And Lactation Text: This medication is Pregnancy Category D and isn't considered safe during pregnancy. It is unknown if this medication is excreted in breast milk.
Klisyri Pregnancy And Lactation Text: It is unknown if this medication can harm a developing fetus or if it is excreted in breast milk.
Terbinafine Counseling: Patient counseling regarding adverse effects of terbinafine including but not limited to headache, diarrhea, rash, upset stomach, liver function test abnormalities, itching, taste/smell disturbance, nausea, abdominal pain, and flatulence.  There is a rare possibility of liver failure that can occur when taking terbinafine.  The patient understands that a baseline LFT and kidney function test may be required. The patient verbalized understanding of the proper use and possible adverse effects of terbinafine.  All of the patient's questions and concerns were addressed.
Fluconazole Pregnancy And Lactation Text: This medication is Pregnancy Category C and it isn't know if it is safe during pregnancy. It is also excreted in breast milk.
Dutasteride Male Counseling: Dustasteride Counseling:  I discussed with the patient the risks of use of dutasteride including but not limited to decreased libido, decreased ejaculate volume, and gynecomastia. Women who can become pregnant should not handle medication.  All of the patient's questions and concerns were addressed.
Clindamycin Pregnancy And Lactation Text: This medication can be used in pregnancy if certain situations. Clindamycin is also present in breast milk.
Simponi Counseling:  I discussed with the patient the risks of golimumab including but not limited to myelosuppression, immunosuppression, autoimmune hepatitis, demyelinating diseases, lymphoma, and serious infections.  The patient understands that monitoring is required including a PPD at baseline and must alert us or the primary physician if symptoms of infection or other concerning signs are noted.
Elidel Counseling: Patient may experience a mild burning sensation during topical application. Elidel is not approved in children less than 2 years of age. There have been case reports of hematologic and skin malignancies in patients using topical calcineurin inhibitors although causality is questionable.
Nsaids Counseling: NSAID Counseling: I discussed with the patient that NSAIDs should be taken with food. Prolonged use of NSAIDs can result in the development of stomach ulcers.  Patient advised to stop taking NSAIDs if abdominal pain occurs.  The patient verbalized understanding of the proper use and possible adverse effects of NSAIDs.  All of the patient's questions and concerns were addressed.
Soolantra Counseling: I discussed with the patients the risks of topial Soolantra. This is a medicine which decreases the number of mites and inflammation in the skin. You experience burning, stinging, eye irritation or allergic reactions.  Please call our office if you develop any problems from using this medication.
Benzoyl Peroxide Pregnancy And Lactation Text: This medication is Pregnancy Category C. It is unknown if benzoyl peroxide is excreted in breast milk.
Xolair Counseling:  Patient informed of potential adverse effects including but not limited to fever, muscle aches, rash and allergic reactions.  The patient verbalized understanding of the proper use and possible adverse effects of Xolair.  All of the patient's questions and concerns were addressed.
Winlevi Pregnancy And Lactation Text: This medication is considered safe during pregnancy and breastfeeding.
Albendazole Pregnancy And Lactation Text: This medication is Pregnancy Category C and it isn't known if it is safe during pregnancy. It is also excreted in breast milk.
Sotyktu Counseling:  I discussed the most common side effects of Sotyktu including: common cold, sore throat, sinus infections, cold sores, canker sores, folliculitis, and acne.  I also discussed more serious side effects of Sotyktu including but not limited to: serious allergic reactions; increased risk for infections such as TB; cancers such as lymphomas; rhabdomyolysis and elevated CPK; and elevated triglycerides and liver enzymes. 
Cosentyx Counseling:  I discussed with the patient the risks of Cosentyx including but not limited to worsening of Crohn's disease, immunosuppression, allergic reactions and infections.  The patient understands that monitoring is required including a PPD at baseline and must alert us or the primary physician if symptoms of infection or other concerning signs are noted.
SSKI Counseling:  I discussed with the patient the risks of SSKI including but not limited to thyroid abnormalities, metallic taste, GI upset, fever, headache, acne, arthralgias, paraesthesias, lymphadenopathy, easy bleeding, arrhythmias, and allergic reaction.
Minoxidil Counseling: Minoxidil is a topical medication which can increase blood flow where it is applied. It is uncertain how this medication increases hair growth. Side effects are uncommon and include stinging and allergic reactions.
Isotretinoin Pregnancy And Lactation Text: This medication is Pregnancy Category X and is considered extremely dangerous during pregnancy. It is unknown if it is excreted in breast milk.
Terbinafine Pregnancy And Lactation Text: This medication is Pregnancy Category B and is considered safe during pregnancy. It is also excreted in breast milk and breast feeding isn't recommended.
Topical Metronidazole Counseling: Metronidazole is a topical antibiotic medication. You may experience burning, stinging, redness, or allergic reactions.  Please call our office if you develop any problems from using this medication.
Griseofulvin Counseling:  I discussed with the patient the risks of griseofulvin including but not limited to photosensitivity, cytopenia, liver damage, nausea/vomiting and severe allergy.  The patient understands that this medication is best absorbed when taken with a fatty meal (e.g., ice cream or french fries).
Dutasteride Female Counseling: Dutasteride Counseling:  I discussed with the patient the risks of use of dutasteride including but not limited to decreased libido and sexual dysfunction. Explained the teratogenic nature of the medication and stressed the importance of not getting pregnant during treatment. All of the patient's questions and concerns were addressed.
Olanzapine Pregnancy And Lactation Text: This medication is pregnancy category C.   There are no adequate and well controlled trials with olanzapine in pregnant females.  Olanzapine should be used during pregnancy only if the potential benefit justifies the potential risk to the fetus.   In a study in lactating healthy women, olanzapine was excreted in breast milk.  It is recommended that women taking olanzapine should not breast feed.
Cellcept Counseling:  I discussed with the patient the risks of mycophenolate mofetil including but not limited to infection/immunosuppression, GI upset, hypokalemia, hypercholesterolemia, bone marrow suppression, lymphoproliferative disorders, malignancy, GI ulceration/bleed/perforation, colitis, interstitial lung disease, kidney failure, progressive multifocal leukoencephalopathy, and birth defects.  The patient understands that monitoring is required including a baseline creatinine and regular CBC testing. In addition, patient must alert us immediately if symptoms of infection or other concerning signs are noted.
Doxycycline Counseling:  Patient counseled regarding possible photosensitivity and increased risk for sunburn.  Patient instructed to avoid sunlight, if possible.  When exposed to sunlight, patients should wear protective clothing, sunglasses, and sunscreen.  The patient was instructed to call the office immediately if the following severe adverse effects occur:  hearing changes, easy bruising/bleeding, severe headache, or vision changes.  The patient verbalized understanding of the proper use and possible adverse effects of doxycycline.  All of the patient's questions and concerns were addressed.
Ilumya Counseling: I discussed with the patient the risks of tildrakizumab including but not limited to immunosuppression, malignancy, posterior leukoencephalopathy syndrome, and serious infections.  The patient understands that monitoring is required including a PPD at baseline and must alert us or the primary physician if symptoms of infection or other concerning signs are noted.
Soolantra Pregnancy And Lactation Text: This medication is Pregnancy Category C. This medication is considered safe during breast feeding.
Arava Counseling:  Patient counseled regarding adverse effects of Arava including but not limited to nausea, vomiting, abnormalities in liver function tests. Patients may develop mouth sores, rash, diarrhea, and abnormalities in blood counts. The patient understands that monitoring is required including LFTs and blood counts.  There is a rare possibility of scarring of the liver and lung problems that can occur when taking methotrexate. Persistent nausea, loss of appetite, pale stools, dark urine, cough, and shortness of breath should be reported immediately. Patient advised to discontinue Arava treatment and consult with a physician prior to attempting conception. The patient will have to undergo a treatment to eliminate Arava from the body prior to conception.
Carac Counseling:  I discussed with the patient the risks of Carac including but not limited to erythema, scaling, itching, weeping, crusting, and pain.
Ivermectin Counseling:  Patient instructed to take medication on an empty stomach with a full glass of water.  Patient informed of potential adverse effects including but not limited to nausea, diarrhea, dizziness, itching, and swelling of the extremities or lymph nodes.  The patient verbalized understanding of the proper use and possible adverse effects of ivermectin.  All of the patient's questions and concerns were addressed.
VTAMA Counseling: I discussed with the patient that VTAMA is not for use in the eyes, mouth or mouth. They should call the office if they develop any signs of allergic reactions to VTAMA. The patient verbalized understanding of the proper use and possible adverse effects of VTAMA.  All of the patient's questions and concerns were addressed.
Nsaids Pregnancy And Lactation Text: These medications are considered safe up to 30 weeks gestation. It is excreted in breast milk.
Sotyktu Pregnancy And Lactation Text: There is insufficient data to evaluate whether or not Sotyktu is safe to use during pregnancy.   It is not known if Sotyktu passes into breast milk and whether or not it is safe to use when breastfeeding.  
Minoxidil Pregnancy And Lactation Text: This medication has not been assigned a Pregnancy Risk Category but animal studies failed to show danger with the topical medication. It is unknown if the medication is excreted in breast milk.
High Dose Vitamin A Counseling: Side effects reviewed, pt to contact office should one occur.
Xolair Pregnancy And Lactation Text: This medication is Pregnancy Category B and is considered safe during pregnancy. This medication is excreted in breast milk.
Griseofulvin Pregnancy And Lactation Text: This medication is Pregnancy Category X and is known to cause serious birth defects. It is unknown if this medication is excreted in breast milk but breast feeding should be avoided.
Rifampin Counseling: I discussed with the patient the risks of rifampin including but not limited to liver damage, kidney damage, red-orange body fluids, nausea/vomiting and severe allergy.
Sski Pregnancy And Lactation Text: This medication is Pregnancy Category D and isn't considered safe during pregnancy. It is excreted in breast milk.
Protopic Counseling: Patient may experience a mild burning sensation during topical application. Protopic is not approved in children less than 2 years of age. There have been case reports of hematologic and skin malignancies in patients using topical calcineurin inhibitors although causality is questionable.
Skyrizi Counseling: I discussed with the patient the risks of risankizumab-rzaa including but not limited to immunosuppression, and serious infections.  The patient understands that monitoring is required including a PPD at baseline and must alert us or the primary physician if symptoms of infection or other concerning signs are noted.
Oral Minoxidil Counseling- I discussed with the patient the risks of oral minoxidil including but not limited to shortness of breath, swelling of the feet or ankles, dizziness, lightheadedness, unwanted hair growth and allergic reaction.  The patient verbalized understanding of the proper use and possible adverse effects of oral minoxidil.  All of the patient's questions and concerns were addressed.
Topical Metronidazole Pregnancy And Lactation Text: This medication is Pregnancy Category B and considered safe during pregnancy.  It is also considered safe to use while breastfeeding.
Glycopyrrolate Counseling:  I discussed with the patient the risks of glycopyrrolate including but not limited to skin rash, drowsiness, dry mouth, difficulty urinating, and blurred vision.
Cibinqo Counseling: I discussed with the patient the risks of Cibinqo therapy including but not limited to common cold, nausea, headache, cold sores, increased blood CPK levels, dizziness, UTIs, fatigue, acne, and vomitting. Live vaccines should be avoided.  This medication has been linked to serious infections; higher rate of mortality; malignancy and lymphoproliferative disorders; major adverse cardiovascular events; thrombosis; thrombocytopenia and lymphopenia; lipid elevations; and retinal detachment.
Doxycycline Pregnancy And Lactation Text: This medication is Pregnancy Category D and not consider safe during pregnancy. It is also excreted in breast milk but is considered safe for shorter treatment courses.
Eucrisa Counseling: Patient may experience a mild burning sensation during topical application. Eucrisa is not approved in children less than 2 years of age.
Topical Retinoid counseling:  Patient advised to apply a pea-sized amount only at bedtime and wait 30 minutes after washing their face before applying.  If too drying, patient may add a non-comedogenic moisturizer. The patient verbalized understanding of the proper use and possible adverse effects of retinoids.  All of the patient's questions and concerns were addressed.
Dutasteride Pregnancy And Lactation Text: This medication is absolutely contraindicated in women, especially during pregnancy and breast feeding. Feminization of male fetuses is possible if taking while pregnant.
Cimetidine Counseling:  I discussed with the patient the risks of Cimetidine including but not limited to gynecomastia, headache, diarrhea, nausea, drowsiness, arrhythmias, pancreatitis, skin rashes, psychosis, bone marrow suppression and kidney toxicity.
Azithromycin Counseling:  I discussed with the patient the risks of azithromycin including but not limited to GI upset, allergic reaction, drug rash, diarrhea, and yeast infections.
Dupixent Counseling: I discussed with the patient the risks of dupilumab including but not limited to eye infection and irritation, cold sores, injection site reactions, worsening of asthma, allergic reactions and increased risk of parasitic infection.  Live vaccines should be avoided while taking dupilumab. Dupilumab will also interact with certain medications such as warfarin and cyclosporine. The patient understands that monitoring is required and they must alert us or the primary physician if symptoms of infection or other concerning signs are noted.
Olanzapine Counseling- I discussed with the patient the common side effects of olanzapine including but are not limited to: lack of energy, dry mouth, increased appetite, sleepiness, tremor, constipation, dizziness, changes in behavior, or restlessness.  Explained that teenagers are more likely to experience headaches, abdominal pain, pain in the arms or legs, tiredness, and sleepiness.  Serious side effects include but are not limited: increased risk of death in elderly patients who are confused, have memory loss, or dementia-related psychosis; hyperglycemia; increased cholesterol and triglycerides; and weight gain.
High Dose Vitamin A Pregnancy And Lactation Text: High dose vitamin A therapy is contraindicated during pregnancy and breast feeding.
Protopic Pregnancy And Lactation Text: This medication is Pregnancy Category C. It is unknown if this medication is excreted in breast milk when applied topically.
Rifampin Pregnancy And Lactation Text: This medication is Pregnancy Category C and it isn't know if it is safe during pregnancy. It is also excreted in breast milk and should not be used if you are breast feeding.
Xeljanz Counseling: I discussed with the patient the risks of Xeljanz therapy including increased risk of infection, liver issues, headache, diarrhea, or cold symptoms. Live vaccines should be avoided. They were instructed to call if they have any problems.
Vtama Pregnancy And Lactation Text: It is unknown if this medication can cause problems during pregnancy and breastfeeding.
Glycopyrrolate Pregnancy And Lactation Text: This medication is Pregnancy Category B and is considered safe during pregnancy. It is unknown if it is excreted breast milk.
Topical Steroids Counseling: I discussed with the patient that prolonged use of topical steroids can result in the increased appearance of superficial blood vessels (telangiectasias), lightening (hypopigmentation) and thinning of the skin (atrophy).  Patient understands to avoid using high potency steroids in skin folds, the groin or the face.  The patient verbalized understanding of the proper use and possible adverse effects of topical steroids.  All of the patient's questions and concerns were addressed.
Thalidomide Counseling: I discussed with the patient the risks of thalidomide including but not limited to birth defects, anxiety, weakness, chest pain, dizziness, cough and severe allergy.
Itraconazole Counseling:  I discussed with the patient the risks of itraconazole including but not limited to liver damage, nausea/vomiting, neuropathy, and severe allergy.  The patient understands that this medication is best absorbed when taken with acidic beverages such as non-diet cola or ginger ale.  The patient understands that monitoring is required including baseline LFTs and repeat LFTs at intervals.  The patient understands that they are to contact us or the primary physician if concerning signs are noted.
Finasteride Male Counseling: Finasteride Counseling:  I discussed with the patient the risks of use of finasteride including but not limited to decreased libido, decreased ejaculate volume, gynecomastia, and depression. Women should not handle medication.  All of the patient's questions and concerns were addressed.
Cyclophosphamide Counseling:  I discussed with the patient the risks of cyclophosphamide including but not limited to hair loss, hormonal abnormalities, decreased fertility, abdominal pain, diarrhea, nausea and vomiting, bone marrow suppression and infection. The patient understands that monitoring is required while taking this medication.
Cibinqo Pregnancy And Lactation Text: It is unknown if this medication will adversely affect pregnancy or breast feeding.  You should not take this medication if you are currently pregnant or planning a pregnancy or while breastfeeding.
Erythromycin Counseling:  I discussed with the patient the risks of erythromycin including but not limited to GI upset, allergic reaction, drug rash, diarrhea, increase in liver enzymes, and yeast infections.
Erivedge Counseling- I discussed with the patient the risks of Erivedge including but not limited to nausea, vomiting, diarrhea, constipation, weight loss, changes in the sense of taste, decreased appetite, muscle spasms, and hair loss.  The patient verbalized understanding of the proper use and possible adverse effects of Erivedge.  All of the patient's questions and concerns were addressed.
Infliximab Counseling:  I discussed with the patient the risks of infliximab including but not limited to myelosuppression, immunosuppression, autoimmune hepatitis, demyelinating diseases, lymphoma, and serious infections.  The patient understands that monitoring is required including a PPD at baseline and must alert us or the primary physician if symptoms of infection or other concerning signs are noted.
Oral Minoxidil Pregnancy And Lactation Text: This medication should only be used when clearly needed if you are pregnant, attempting to become pregnant or breast feeding.
Clofazimine Counseling:  I discussed with the patient the risks of clofazimine including but not limited to skin and eye pigmentation, liver damage, nausea/vomiting, gastrointestinal bleeding and allergy.
Calcipotriene Counseling:  I discussed with the patient the risks of calcipotriene including but not limited to erythema, scaling, itching, and irritation.
Xelmalkaz Pregnancy And Lactation Text: This medication is Pregnancy Category D and is not considered safe during pregnancy.  The risk during breast feeding is also uncertain.
Azithromycin Pregnancy And Lactation Text: This medication is considered safe during pregnancy and is also secreted in breast milk.
Dupixent Pregnancy And Lactation Text: This medication likely crosses the placenta but the risk for the fetus is uncertain. This medication is excreted in breast milk.
Qbrexza Counseling:  I discussed with the patient the risks of Qbrexza including but not limited to headache, mydriasis, blurred vision, dry eyes, nasal dryness, dry mouth, dry throat, dry skin, urinary hesitation, and constipation.  Local skin reactions including erythema, burning, stinging, and itching can also occur.
Sarecycline Counseling: Patient advised regarding possible photosensitivity and discoloration of the teeth, skin, lips, tongue and gums.  Patient instructed to avoid sunlight, if possible.  When exposed to sunlight, patients should wear protective clothing, sunglasses, and sunscreen.  The patient was instructed to call the office immediately if the following severe adverse effects occur:  hearing changes, easy bruising/bleeding, severe headache, or vision changes.  The patient verbalized understanding of the proper use and possible adverse effects of sarecycline.  All of the patient's questions and concerns were addressed.
Zoryve Counseling:  I discussed with the patient that Zoryve is not for use in the eyes, mouth or vagina. The most commonly reported side effects include diarrhea, headache, insomnia, application site pain, upper respiratory tract infections, and urinary tract infections.  All of the patient's questions and concerns were addressed.
Birth Control Pills Counseling: Birth Control Pill Counseling: I discussed with the patient the potential side effects of OCPs including but not limited to increased risk of stroke, heart attack, thrombophlebitis, deep venous thrombosis, hepatic adenomas, breast changes, GI upset, headaches, and depression.  The patient verbalized understanding of the proper use and possible adverse effects of OCPs. All of the patient's questions and concerns were addressed.
Stelara Counseling:  I discussed with the patient the risks of ustekinumab including but not limited to immunosuppression, malignancy, posterior leukoencephalopathy syndrome, and serious infections.  The patient understands that monitoring is required including a PPD at baseline and must alert us or the primary physician if symptoms of infection or other concerning signs are noted.
Cyclophosphamide Pregnancy And Lactation Text: This medication is Pregnancy Category D and it isn't considered safe during pregnancy. This medication is excreted in breast milk.
Finasteride Female Counseling: Finasteride Counseling:  I discussed with the patient the risks of use of finasteride including but not limited to decreased libido and sexual dysfunction. Explained the teratogenic nature of the medication and stressed the importance of not getting pregnant during treatment. All of the patient's questions and concerns were addressed.
Litfulo Counseling: I discussed with the patient the risks of Litfulo therapy including but not limited to upper respiratory tract infections, shingles, cold sores, and nausea. Live vaccines should be avoided.  This medication has been linked to serious infections; higher rate of mortality; malignancy and lymphoproliferative disorders; major adverse cardiovascular events; thrombosis; gastrointestinal perforations; neutropenia; lymphopenia; anemia; liver enzyme elevations; and lipid elevations.
Hydroquinone Counseling:  Patient advised that medication may result in skin irritation, lightening (hypopigmentation), dryness, and burning.  In the event of skin irritation, the patient was advised to reduce the amount of the drug applied or use it less frequently.  Rarely, spots that are treated with hydroquinone can become darker (pseudoochronosis).  Should this occur, patient instructed to stop medication and call the office. The patient verbalized understanding of the proper use and possible adverse effects of hydroquinone.  All of the patient's questions and concerns were addressed.
Topical Steroids Applications Pregnancy And Lactation Text: Most topical steroids are considered safe to use during pregnancy and lactation.  Any topical steroid applied to the breast or nipple should be washed off before breastfeeding.
Hydroxychloroquine Counseling:  I discussed with the patient that a baseline ophthalmologic exam is needed at the start of therapy and every year thereafter while on therapy. A CBC may also be warranted for monitoring.  The side effects of this medication were discussed with the patient, including but not limited to agranulocytosis, aplastic anemia, seizures, rashes, retinopathy, and liver toxicity. Patient instructed to call the office should any adverse effect occur.  The patient verbalized understanding of the proper use and possible adverse effects of Plaquenil.  All the patient's questions and concerns were addressed.
Calcipotriene Pregnancy And Lactation Text: The use of this medication during pregnancy or lactation is not recommended as there is insufficient data.
Otezla Counseling: The side effects of Otezla were discussed with the patient, including but not limited to worsening or new depression, weight loss, diarrhea, nausea, upper respiratory tract infection, and headache. Patient instructed to call the office should any adverse effect occur.  The patient verbalized understanding of the proper use and possible adverse effects of Otezla.  All the patient's questions and concerns were addressed.
Erythromycin Pregnancy And Lactation Text: This medication is Pregnancy Category B and is considered safe during pregnancy. It is also excreted in breast milk.
Adbry Counseling: I discussed with the patient the risks of tralokinumab including but not limited to eye infection and irritation, cold sores, injection site reactions, worsening of asthma, allergic reactions and increased risk of parasitic infection.  Live vaccines should be avoided while taking tralokinumab. The patient understands that monitoring is required and they must alert us or the primary physician if symptoms of infection or other concerning signs are noted.
Doxepin Counseling:  Patient advised that the medication is sedating and not to drive a car after taking this medication. Patient informed of potential adverse effects including but not limited to dry mouth, urinary retention, and blurry vision.  The patient verbalized understanding of the proper use and possible adverse effects of doxepin.  All of the patient's questions and concerns were addressed.
Tazorac Counseling:  Patient advised that medication is irritating and drying.  Patient may need to apply sparingly and wash off after an hour before eventually leaving it on overnight.  The patient verbalized understanding of the proper use and possible adverse effects of tazorac.  All of the patient's questions and concerns were addressed.
Cantharidin Counseling:  I discussed with the patient the risks of Cantharidin including but not limited to pain, redness, burning, itching, and blistering.
Bactrim Counseling:  I discussed with the patient the risks of sulfa antibiotics including but not limited to GI upset, allergic reaction, drug rash, diarrhea, dizziness, photosensitivity, and yeast infections.  Rarely, more serious reactions can occur including but not limited to aplastic anemia, agranulocytosis, methemoglobinemia, blood dyscrasias, liver or kidney failure, lung infiltrates or desquamative/blistering drug rashes.
Ketoconazole Counseling:   Patient counseled regarding improving absorption with orange juice.  Adverse effects include but are not limited to breast enlargement, headache, diarrhea, nausea, upset stomach, liver function test abnormalities, taste disturbance, and stomach pain.  There is a rare possibility of liver failure that can occur when taking ketoconazole. The patient understands that monitoring of LFTs may be required, especially at baseline. The patient verbalized understanding of the proper use and possible adverse effects of ketoconazole.  All of the patient's questions and concerns were addressed.
Enbrel Counseling:  I discussed with the patient the risks of etanercept including but not limited to myelosuppression, immunosuppression, autoimmune hepatitis, demyelinating diseases, lymphoma, and infections.  The patient understands that monitoring is required including a PPD at baseline and must alert us or the primary physician if symptoms of infection or other concerning signs are noted.
Libtayo Counseling- I discussed with the patient the risks of Libtayo including but not limited to nausea, vomiting, diarrhea, and bone or muscle pain.  The patient verbalized understanding of the proper use and possible adverse effects of Libtayo.  All of the patient's questions and concerns were addressed.
Qbrexza Pregnancy And Lactation Text: There is no available data on Qbrexza use in pregnant women.  There is no available data on Qbrexza use in lactation.
Tranexamic Acid Counseling:  Patient advised of the small risk of bleeding problems with tranexamic acid. They were also instructed to call if they developed any nausea, vomiting or diarrhea. All of the patient's questions and concerns were addressed.
Birth Control Pills Pregnancy And Lactation Text: This medication should be avoided if pregnant and for the first 30 days post-partum.
Cantharidin Pregnancy And Lactation Text: This medication has not been proven safe during pregnancy. It is unknown if this medication is excreted in breast milk.
Aklief counseling:  Patient advised to apply a pea-sized amount only at bedtime and wait 30 minutes after washing their face before applying.  If too drying, patient may add a non-comedogenic moisturizer.  The most commonly reported side effects including irritation, redness, scaling, dryness, stinging, burning, itching, and increased risk of sunburn.  The patient verbalized understanding of the proper use and possible adverse effects of retinoids.  All of the patient's questions and concerns were addressed.
Hydroxychloroquine Pregnancy And Lactation Text: This medication has been shown to cause fetal harm but it isn't assigned a Pregnancy Risk Category. There are small amounts excreted in breast milk.
Cyclosporine Counseling:  I discussed with the patient the risks of cyclosporine including but not limited to hypertension, gingival hyperplasia,myelosuppression, immunosuppression, liver damage, kidney damage, neurotoxicity, lymphoma, and serious infections. The patient understands that monitoring is required including baseline blood pressure, CBC, CMP, lipid panel and uric acid, and then 1-2 times monthly CMP and blood pressure.
Metronidazole Counseling:  I discussed with the patient the risks of metronidazole including but not limited to seizures, nausea/vomiting, a metallic taste in the mouth, nausea/vomiting and severe allergy.
Litfulo Pregnancy And Lactation Text: Based on animal studies, Lifulo may cause embryo-fetal harm when administered to pregnant women.  The medication should not be used in pregnancy.  Breastfeeding is not recommended during treatment.
Mirvaso Counseling: Mirvaso is a topical medication which can decrease superficial blood flow where applied. Side effects are uncommon and include stinging, redness and allergic reactions.
Topical Sulfur Applications Counseling: Topical Sulfur Counseling: Patient counseled that this medication may cause skin irritation or allergic reactions.  In the event of skin irritation, the patient was advised to reduce the amount of the drug applied or use it less frequently.   The patient verbalized understanding of the proper use and possible adverse effects of topical sulfur application.  All of the patient's questions and concerns were addressed.
Colchicine Counseling:  Patient counseled regarding adverse effects including but not limited to stomach upset (nausea, vomiting, stomach pain, or diarrhea).  Patient instructed to limit alcohol consumption while taking this medication.  Colchicine may reduce blood counts especially with prolonged use.  The patient understands that monitoring of kidney function and blood counts may be required, especially at baseline. The patient verbalized understanding of the proper use and possible adverse effects of colchicine.  All of the patient's questions and concerns were addressed.
Finasteride Pregnancy And Lactation Text: This medication is absolutely contraindicated during pregnancy. It is unknown if it is excreted in breast milk.
Otezla Pregnancy And Lactation Text: This medication is Pregnancy Category C and it isn't known if it is safe during pregnancy. It is unknown if it is excreted in breast milk.
Adbry Pregnancy And Lactation Text: It is unknown if this medication will adversely affect pregnancy or breast feeding.
Doxepin Pregnancy And Lactation Text: This medication is Pregnancy Category C and it isn't known if it is safe during pregnancy. It is also excreted in breast milk and breast feeding isn't recommended.
Acitretin Counseling:  I discussed with the patient the risks of acitretin including but not limited to hair loss, dry lips/skin/eyes, liver damage, hyperlipidemia, depression/suicidal ideation, photosensitivity.  Serious rare side effects can include but are not limited to pancreatitis, pseudotumor cerebri, bony changes, clot formation/stroke/heart attack.  Patient understands that alcohol is contraindicated since it can result in liver toxicity and significantly prolong the elimination of the drug by many years.
Rituxan Counseling:  I discussed with the patient the risks of Rituxan infusions. Side effects can include infusion reactions, severe drug rashes including mucocutaneous reactions, reactivation of latent hepatitis and other infections and rarely progressive multifocal leukoencephalopathy.  All of the patient's questions and concerns were addressed.
Bactrim Pregnancy And Lactation Text: This medication is Pregnancy Category D and is known to cause fetal risk.  It is also excreted in breast milk.
5-Fu Counseling: 5-Fluorouracil Counseling:  I discussed with the patient the risks of 5-fluorouracil including but not limited to erythema, scaling, itching, weeping, crusting, and pain.
Tazorac Pregnancy And Lactation Text: This medication is not safe during pregnancy. It is unknown if this medication is excreted in breast milk.
Tetracycline Counseling: Patient counseled regarding possible photosensitivity and increased risk for sunburn.  Patient instructed to avoid sunlight, if possible.  When exposed to sunlight, patients should wear protective clothing, sunglasses, and sunscreen.  The patient was instructed to call the office immediately if the following severe adverse effects occur:  hearing changes, easy bruising/bleeding, severe headache, or vision changes.  The patient verbalized understanding of the proper use and possible adverse effects of tetracycline.  All of the patient's questions and concerns were addressed. Patient understands to avoid pregnancy while on therapy due to potential birth defects.
Rhofade Counseling: Rhofade is a topical medication which can decrease superficial blood flow where applied. Side effects are uncommon and include stinging, redness and allergic reactions.
Low Dose Naltrexone Counseling- I discussed with the patient the potential risks and side effects of low dose naltrexone including but not limited to: more vivid dreams, headaches, nausea, vomiting, abdominal pain, fatigue, dizziness, and anxiety.
Zyclara Counseling:  I discussed with the patient the risks of imiquimod including but not limited to erythema, scaling, itching, weeping, crusting, and pain.  Patient understands that the inflammatory response to imiquimod is variable from person to person and was educated regarded proper titration schedule.  If flu-like symptoms develop, patient knows to discontinue the medication and contact us.
Taltz Counseling: I discussed with the patient the risks of ixekizumab including but not limited to immunosuppression, serious infections, worsening of inflammatory bowel disease and drug reactions.  The patient understands that monitoring is required including a PPD at baseline and must alert us or the primary physician if symptoms of infection or other concerning signs are noted.
Tranexamic Acid Pregnancy And Lactation Text: It is unknown if this medication is safe during pregnancy or breast feeding.
Aklief Pregnancy And Lactation Text: It is unknown if this medication is safe to use during pregnancy.  It is unknown if this medication is excreted in breast milk.  Breastfeeding women should use the topical cream on the smallest area of the skin for the shortest time needed while breastfeeding.  Do not apply to nipple and areola.
Spironolactone Counseling: Patient advised regarding risks of diarrhea, abdominal pain, hyperkalemia, birth defects (for female patients), liver toxicity and renal toxicity. The patient may need blood work to monitor liver and kidney function and potassium levels while on therapy. The patient verbalized understanding of the proper use and possible adverse effects of spironolactone.  All of the patient's questions and concerns were addressed.

## 2024-05-17 NOTE — PROCEDURE: ADDITIONAL NOTES
Render Risk Assessment In Note?: no
Detail Level: Simple
Additional Notes: Recommended to use T-Sal or T-gel shampoo and to let it sit for 10-15 mins before washing out.

## 2024-05-22 ENCOUNTER — APPOINTMENT (OUTPATIENT)
Dept: PHYSICAL THERAPY | Facility: REHABILITATION | Age: 39
End: 2024-05-22
Attending: FAMILY MEDICINE
Payer: COMMERCIAL

## 2024-05-29 ENCOUNTER — APPOINTMENT (OUTPATIENT)
Dept: PHYSICAL THERAPY | Facility: REHABILITATION | Age: 39
End: 2024-05-29
Attending: FAMILY MEDICINE
Payer: COMMERCIAL

## 2024-06-05 ENCOUNTER — APPOINTMENT (OUTPATIENT)
Dept: PHYSICAL THERAPY | Facility: REHABILITATION | Age: 39
End: 2024-06-05
Attending: PHYSICAL MEDICINE & REHABILITATION
Payer: COMMERCIAL

## 2024-06-06 ENCOUNTER — APPOINTMENT (RX ONLY)
Dept: URBAN - METROPOLITAN AREA CLINIC 4 | Facility: CLINIC | Age: 39
Setting detail: DERMATOLOGY
End: 2024-06-06

## 2024-06-06 DIAGNOSIS — L72.8 OTHER FOLLICULAR CYSTS OF THE SKIN AND SUBCUTANEOUS TISSUE: ICD-10-CM

## 2024-06-06 PROCEDURE — ? EXCISION

## 2024-06-06 PROCEDURE — ? OBSERVATION AND MEASURE

## 2024-06-06 PROCEDURE — 13101 CMPLX RPR TRUNK 2.6-7.5 CM: CPT

## 2024-06-06 PROCEDURE — 11404 EXC TR-EXT B9+MARG 3.1-4 CM: CPT

## 2024-06-06 ASSESSMENT — LOCATION DETAILED DESCRIPTION DERM: LOCATION DETAILED: RIGHT SUPERIOR UPPER BACK

## 2024-06-06 ASSESSMENT — LOCATION ZONE DERM: LOCATION ZONE: TRUNK

## 2024-06-06 ASSESSMENT — LOCATION SIMPLE DESCRIPTION DERM: LOCATION SIMPLE: RIGHT UPPER BACK

## 2024-06-06 NOTE — PROCEDURE: EXCISION
Medical Necessity Information: It is in your best interest to select a reason for this procedure from the list below. All of these items fulfill various CMS LCD requirements except lesion extends to a margin.
Include Z78.9 (Other Specified Conditions Influencing Health Status) As An Associated Diagnosis?: No
Medical Necessity Clause: This procedure was medically necessary because the lesion that was treated was:
Lab: [id number]
Lab Facility: 
Surgeon (Optional): Jhoana
Biopsy Photograph Reviewed: Yes
Size Of Lesion In Cm: 2.9
Size Of Margin In Cm: 0.2
Anesthesia Volume In Cc: 8.7
Eye Clamp Note Details: An eye clamp was used during the procedure.
Excision Method: Elliptical
Saucerization Depth: dermis and superficial adipose tissue
Repair Type: Complex
Intermediate / Complex Repair - Final Wound Length In Cm: 3.5
Suturegard Retention Suture: 2-0 Nylon
Retention Suture Bite Size: 3 mm
Length To Time In Minutes Device Was In Place: 10
Number Of Hemigard Strips Per Side: 1
Width Of Defect Perpendicular To Closure In Cm (Required): 2.6
Distance Of Undermining In Cm (Required): 3.1
Undermining Type: Entire Wound
Debridement Text: The wound edges were debrided prior to proceeding with the closure to facilitate wound healing.
Helical Rim Text: The closure involved the helical rim.
Vermilion Border Text: The closure involved the vermilion border.
Nostril Rim Text: The closure involved the nostril rim.
Retention Suture Text: Retention sutures were placed to support the closure and prevent dehiscence.
Primary Defect Length (In Cm): 0
Epidermal Closure Graft Donor Site (Optional): simple interrupted
Graft Donor Site Bandage (Optional-Leave Blank If You Don't Want In Note): Steri-strips and a pressure bandage were applied to the donor site.
Detail Level: Detailed
Excision Depth: adipose tissue
Scalpel Size: 15 blade
Anesthesia Type: 1% lidocaine with 1:100,000 epinephrine and 408mcg clindamycin/ml and a 1:10 solution of 8.4% sodium bicarbonate
Additional Anesthesia Volume In Cc: 6
Hemostasis: Electrocautery
Estimated Blood Loss (Cc): minimal
Repair Depth: use same depth as excision depth
Repair Anesthesia Method: local infiltration
Anesthesia Volume In Cc: 14.7
Deep Sutures: 2-0 Vicryl
Dermal Closure: buried vertical mattress
Epidermal Sutures: 4-0 Vicryl Rapide
Epidermal Closure: running subcuticular
Wound Care: Bacitracin
Dressing: steri-strips
Suturegard Intro: Intraoperative tissue expansion was performed, utilizing the SUTUREGARD device, in order to reduce wound tension.
Suturegard Body: The suture ends were repeatedly re-tightened and re-clamped to achieve the desired tissue expansion.
Hemigard Intro: Due to skin fragility and wound tension, it was decided to use HEMIGARD adhesive retention suture devices to permit a linear closure. The skin was cleaned and dried for a 6cm distance away from the wound. Excessive hair, if present, was removed to allow for adhesion.
Hemigard Postcare Instructions: The HEMIGARD strips are to remain completely dry for at least 5-7 days.
Positioning (Leave Blank If You Do Not Want): The patient was placed in a comfortable position exposing the surgical site.
Complex Repair Preamble Text (Leave Blank If You Do Not Want): Extensive wide undermining was performed.
Intermediate Repair Preamble Text (Leave Blank If You Do Not Want): Undermining was performed with blunt dissection.
Fusiform Excision Additional Text (Leave Blank If You Do Not Want): The margin was drawn around the clinically apparent lesion.  A fusiform shape was then drawn on the skin incorporating the lesion and margins.  Incisions were then made along these lines to the appropriate tissue plane and the lesion was extirpated.
Eliptical Excision Additional Text (Leave Blank If You Do Not Want): The margin was drawn around the clinically apparent lesion.  An elliptical shape was then drawn on the skin incorporating the lesion and margins.  Incisions were then made along these lines to the appropriate tissue plane and the lesion was extirpated.
Saucerization Excision Additional Text (Leave Blank If You Do Not Want): The margin was drawn around the clinically apparent lesion.  Incisions were then made along these lines, in a tangential fashion, to the appropriate tissue plane and the lesion was extirpated.
Slit Excision Additional Text (Leave Blank If You Do Not Want): A linear line was drawn on the skin overlying the lesion. An incision was made slowly until the lesion was visualized.  Once visualized, the lesion was removed with blunt dissection.
Excisional Biopsy Additional Text (Leave Blank If You Do Not Want): The margin was drawn around the clinically apparent lesion. An elliptical shape was then drawn on the skin incorporating the lesion and margins.  Incisions were then made along these lines to the appropriate tissue plane and the lesion was extirpated.
Perilesional Excision Additional Text (Leave Blank If You Do Not Want): The margin was drawn around the clinically apparent lesion. Incisions were then made along these lines to the appropriate tissue plane and the lesion was extirpated.
Repair Performed By Another Provider Text (Leave Blank If You Do Not Want): After the tissue was excised the defect was repaired by another provider.
No Repair - Repaired With Adjacent Surgical Defect Text (Leave Blank If You Do Not Want): After the excision the defect was repaired concurrently with another surgical defect which was in close approximation.
Adjacent Tissue Transfer Text: The defect edges were debeveled with a #15 scalpel blade. Given the location of the defect and the proximity to free margins an adjacent tissue transfer was deemed most appropriate. Using a sterile surgical marker, an appropriate flap was drawn incorporating the defect and placing the expected incisions within the relaxed skin tension lines where possible. The area thus outlined was incised deep to adipose tissue with a #15 scalpel blade. The skin margins were undermined to an appropriate distance in all directions utilizing iris scissors and carried over to close the primary defect.
Advancement Flap (Single) Text: The defect edges were debeveled with a #15 scalpel blade.  Given the location of the defect and the proximity to free margins a single advancement flap was deemed most appropriate.  Using a sterile surgical marker, an appropriate advancement flap was drawn incorporating the defect and placing the expected incisions within the relaxed skin tension lines where possible.    The area thus outlined was incised deep to adipose tissue with a #15 scalpel blade.  The skin margins were undermined to an appropriate distance in all directions utilizing iris scissors.
Advancement Flap (Double) Text: The defect edges were debeveled with a #15 scalpel blade.  Given the location of the defect and the proximity to free margins a double advancement flap was deemed most appropriate.  Using a sterile surgical marker, the appropriate advancement flaps were drawn incorporating the defect and placing the expected incisions within the relaxed skin tension lines where possible.    The area thus outlined was incised deep to adipose tissue with a #15 scalpel blade.  The skin margins were undermined to an appropriate distance in all directions utilizing iris scissors.
Burow's Advancement Flap Text: The defect edges were debeveled with a #15 scalpel blade.  Given the location of the defect and the proximity to free margins a Burow's advancement flap was deemed most appropriate.  Using a sterile surgical marker, the appropriate advancement flap was drawn incorporating the defect and placing the expected incisions within the relaxed skin tension lines where possible.    The area thus outlined was incised deep to adipose tissue with a #15 scalpel blade.  The skin margins were undermined to an appropriate distance in all directions utilizing iris scissors.
Chonodrocutaneous Helical Advancement Flap Text: The defect edges were debeveled with a #15 scalpel blade.  Given the location of the defect and the proximity to free margins a chondrocutaneous helical advancement flap was deemed most appropriate.  Using a sterile surgical marker, the appropriate advancement flap was drawn incorporating the defect and placing the expected incisions within the relaxed skin tension lines where possible.    The area thus outlined was incised deep to adipose tissue with a #15 scalpel blade.  The skin margins were undermined to an appropriate distance in all directions utilizing iris scissors.
Crescentic Advancement Flap Text: The defect edges were debeveled with a #15 scalpel blade.  Given the location of the defect and the proximity to free margins a crescentic advancement flap was deemed most appropriate.  Using a sterile surgical marker, the appropriate advancement flap was drawn incorporating the defect and placing the expected incisions within the relaxed skin tension lines where possible.    The area thus outlined was incised deep to adipose tissue with a #15 scalpel blade.  The skin margins were undermined to an appropriate distance in all directions utilizing iris scissors.
A-T Advancement Flap Text: The defect edges were debeveled with a #15 scalpel blade.  Given the location of the defect, shape of the defect and the proximity to free margins an A-T advancement flap was deemed most appropriate.  Using a sterile surgical marker, an appropriate advancement flap was drawn incorporating the defect and placing the expected incisions within the relaxed skin tension lines where possible.    The area thus outlined was incised deep to adipose tissue with a #15 scalpel blade.  The skin margins were undermined to an appropriate distance in all directions utilizing iris scissors.
O-T Advancement Flap Text: The defect edges were debeveled with a #15 scalpel blade.  Given the location of the defect, shape of the defect and the proximity to free margins an O-T advancement flap was deemed most appropriate.  Using a sterile surgical marker, an appropriate advancement flap was drawn incorporating the defect and placing the expected incisions within the relaxed skin tension lines where possible.    The area thus outlined was incised deep to adipose tissue with a #15 scalpel blade.  The skin margins were undermined to an appropriate distance in all directions utilizing iris scissors.
O-L Flap Text: The defect edges were debeveled with a #15 scalpel blade.  Given the location of the defect, shape of the defect and the proximity to free margins an O-L flap was deemed most appropriate.  Using a sterile surgical marker, an appropriate advancement flap was drawn incorporating the defect and placing the expected incisions within the relaxed skin tension lines where possible.    The area thus outlined was incised deep to adipose tissue with a #15 scalpel blade.  The skin margins were undermined to an appropriate distance in all directions utilizing iris scissors.
O-Z Flap Text: The defect edges were debeveled with a #15 scalpel blade.  Given the location of the defect, shape of the defect and the proximity to free margins an O-Z flap was deemed most appropriate.  Using a sterile surgical marker, an appropriate transposition flap was drawn incorporating the defect and placing the expected incisions within the relaxed skin tension lines where possible. The area thus outlined was incised deep to adipose tissue with a #15 scalpel blade.  The skin margins were undermined to an appropriate distance in all directions utilizing iris scissors.
Double O-Z Flap Text: The defect edges were debeveled with a #15 scalpel blade.  Given the location of the defect, shape of the defect and the proximity to free margins a Double O-Z flap was deemed most appropriate.  Using a sterile surgical marker, an appropriate transposition flap was drawn incorporating the defect and placing the expected incisions within the relaxed skin tension lines where possible. The area thus outlined was incised deep to adipose tissue with a #15 scalpel blade.  The skin margins were undermined to an appropriate distance in all directions utilizing iris scissors.
V-Y Flap Text: The defect edges were debeveled with a #15 scalpel blade.  Given the location of the defect, shape of the defect and the proximity to free margins a V-Y flap was deemed most appropriate.  Using a sterile surgical marker, an appropriate advancement flap was drawn incorporating the defect and placing the expected incisions within the relaxed skin tension lines where possible.    The area thus outlined was incised deep to adipose tissue with a #15 scalpel blade.  The skin margins were undermined to an appropriate distance in all directions utilizing iris scissors.
Advancement-Rotation Flap Text: The defect edges were debeveled with a #15 scalpel blade. Given the location of the defect, shape of the defect and the proximity to free margins an advancement-rotation flap was deemed most appropriate. Using a sterile surgical marker, an appropriate flap was drawn incorporating the defect and placing the expected incisions within the relaxed skin tension lines where possible. The area thus outlined was incised deep to adipose tissue with a #15 scalpel blade. The skin margins were undermined to an appropriate distance in all directions utilizing iris scissors. Following this, the designed flap was carried over into the primary defect and sutured into place.
Mercedes Flap Text: The defect edges were debeveled with a #15 scalpel blade.  Given the location of the defect, shape of the defect and the proximity to free margins a Mercedes flap was deemed most appropriate.  Using a sterile surgical marker, an appropriate advancement flap was drawn incorporating the defect and placing the expected incisions within the relaxed skin tension lines where possible. The area thus outlined was incised deep to adipose tissue with a #15 scalpel blade.  The skin margins were undermined to an appropriate distance in all directions utilizing iris scissors.
Modified Advancement Flap Text: The defect edges were debeveled with a #15 scalpel blade.  Given the location of the defect, shape of the defect and the proximity to free margins a modified advancement flap was deemed most appropriate.  Using a sterile surgical marker, an appropriate advancement flap was drawn incorporating the defect and placing the expected incisions within the relaxed skin tension lines where possible.    The area thus outlined was incised deep to adipose tissue with a #15 scalpel blade.  The skin margins were undermined to an appropriate distance in all directions utilizing iris scissors.
Mucosal Advancement Flap Text: Given the location of the defect, shape of the defect and the proximity to free margins a mucosal advancement flap was deemed most appropriate. Incisions were made with a 15 blade scalpel in the appropriate fashion along the cutaneous vermillion border and the mucosal lip. The remaining actinically damaged mucosal tissue was excised.  The mucosal advancement flap was then elevated to the gingival sulcus with care taken to preserve the neurovascular structures and advanced into the primary defect. Care was taken to ensure that precise realignment of the vermilion border was achieved.
Peng Advancement Flap Text: The defect edges were debeveled with a #15 scalpel blade. Given the location of the defect, shape of the defect and the proximity to free margins a Peng advancement flap was deemed most appropriate. Using a sterile surgical marker, an appropriate advancement flap was drawn incorporating the defect and placing the expected incisions within the relaxed skin tension lines where possible. The area thus outlined was incised deep to adipose tissue with a #15 scalpel blade. The skin margins were undermined to an appropriate distance in all directions utilizing iris scissors. Following this, the designed flap was advanced and carried over into the primary defect and sutured into place.
Hatchet Flap Text: The defect edges were debeveled with a #15 scalpel blade.  Given the location of the defect, shape of the defect and the proximity to free margins a hatchet flap was deemed most appropriate.  Using a sterile surgical marker, an appropriate hatchet flap was drawn incorporating the defect and placing the expected incisions within the relaxed skin tension lines where possible.    The area thus outlined was incised deep to adipose tissue with a #15 scalpel blade.  The skin margins were undermined to an appropriate distance in all directions utilizing iris scissors.
Rotation Flap Text: The defect edges were debeveled with a #15 scalpel blade.  Given the location of the defect, shape of the defect and the proximity to free margins a rotation flap was deemed most appropriate.  Using a sterile surgical marker, an appropriate rotation flap was drawn incorporating the defect and placing the expected incisions within the relaxed skin tension lines where possible.    The area thus outlined was incised deep to adipose tissue with a #15 scalpel blade.  The skin margins were undermined to an appropriate distance in all directions utilizing iris scissors.
Bilateral Rotation Flap Text: The defect edges were debeveled with a #15 scalpel blade. Given the location of the defect, shape of the defect and the proximity to free margins a bilateral rotation flap was deemed most appropriate. Using a sterile surgical marker, an appropriate rotation flap was drawn incorporating the defect and placing the expected incisions within the relaxed skin tension lines where possible. The area thus outlined was incised deep to adipose tissue with a #15 scalpel blade. The skin margins were undermined to an appropriate distance in all directions utilizing iris scissors. Following this, the designed flap was carried over into the primary defect and sutured into place.
Spiral Flap Text: The defect edges were debeveled with a #15 scalpel blade.  Given the location of the defect, shape of the defect and the proximity to free margins a spiral flap was deemed most appropriate.  Using a sterile surgical marker, an appropriate rotation flap was drawn incorporating the defect and placing the expected incisions within the relaxed skin tension lines where possible. The area thus outlined was incised deep to adipose tissue with a #15 scalpel blade.  The skin margins were undermined to an appropriate distance in all directions utilizing iris scissors.
Staged Advancement Flap Text: The defect edges were debeveled with a #15 scalpel blade. Given the location of the defect, shape of the defect and the proximity to free margins a staged advancement flap was deemed most appropriate. Using a sterile surgical marker, an appropriate advancement flap was drawn incorporating the defect and placing the expected incisions within the relaxed skin tension lines where possible. The area thus outlined was incised deep to adipose tissue with a #15 scalpel blade. The skin margins were undermined to an appropriate distance in all directions utilizing iris scissors. Following this, the designed flap was carried over into the primary defect and sutured into place.
Star Wedge Flap Text: The defect edges were debeveled with a #15 scalpel blade.  Given the location of the defect, shape of the defect and the proximity to free margins a star wedge flap was deemed most appropriate.  Using a sterile surgical marker, an appropriate rotation flap was drawn incorporating the defect and placing the expected incisions within the relaxed skin tension lines where possible. The area thus outlined was incised deep to adipose tissue with a #15 scalpel blade.  The skin margins were undermined to an appropriate distance in all directions utilizing iris scissors.
Transposition Flap Text: The defect edges were debeveled with a #15 scalpel blade.  Given the location of the defect and the proximity to free margins a transposition flap was deemed most appropriate.  Using a sterile surgical marker, an appropriate transposition flap was drawn incorporating the defect.    The area thus outlined was incised deep to adipose tissue with a #15 scalpel blade.  The skin margins were undermined to an appropriate distance in all directions utilizing iris scissors.
Muscle Hinge Flap Text: The defect edges were debeveled with a #15 scalpel blade.  Given the size, depth and location of the defect and the proximity to free margins a muscle hinge flap was deemed most appropriate.  Using a sterile surgical marker, an appropriate hinge flap was drawn incorporating the defect. The area thus outlined was incised with a #15 scalpel blade.  The skin margins were undermined to an appropriate distance in all directions utilizing iris scissors.
Mustarde Flap Text: The defect edges were debeveled with a #15 scalpel blade.  Given the size, depth and location of the defect and the proximity to free margins a Mustarde flap was deemed most appropriate. Using a sterile surgical marker, an appropriate flap was drawn incorporating the defect. The area thus outlined was incised with a #15 scalpel blade. The skin margins were undermined to an appropriate distance in all directions utilizing iris scissors. Following this, the designed flap was carried into the primary defect and sutured into place.
Nasal Turnover Hinge Flap Text: The defect edges were debeveled with a #15 scalpel blade.  Given the size, depth, location of the defect and the defect being full thickness a nasal turnover hinge flap was deemed most appropriate.  Using a sterile surgical marker, an appropriate hinge flap was drawn incorporating the defect. The area thus outlined was incised with a #15 scalpel blade. The flap was designed to recreate the nasal mucosal lining and the alar rim. The skin margins were undermined to an appropriate distance in all directions utilizing iris scissors.
Nasalis-Muscle-Based Myocutaneous Island Pedicle Flap Text: Using a #15 blade, an incision was made around the donor flap to the level of the nasalis muscle. Wide lateral undermining was then performed in both the subcutaneous plane above the nasalis muscle, and in a submuscular plane just above periosteum. This allowed the formation of a free nasalis muscle axial pedicle (based on the angular artery) which was still attached to the actual cutaneous flap, increasing its mobility and vascular viability. Hemostasis was obtained with pinpoint electrocoagulation. The flap was mobilized into position and the pivotal anchor points positioned and stabilized with buried interrupted sutures. Subcutaneous and dermal tissues were closed in a multilayered fashion with sutures. Tissue redundancies were excised, and the epidermal edges were apposed without significant tension and sutured with sutures.
Nasalis Myocutaneous Flap Text: Using a #15 blade, an incision was made around the donor flap to the level of the nasalis muscle. Wide lateral undermining was then performed in both the subcutaneous plane above the nasalis muscle, and in a submuscular plane just above periosteum. This allowed the formation of a free nasalis muscle axial pedicle which was still attached to the actual cutaneous flap, increasing its mobility and vascular viability. Hemostasis was obtained with pinpoint electrocoagulation. The flap was mobilized into position and the pivotal anchor points positioned and stabilized with buried interrupted sutures. Subcutaneous and dermal tissues were closed in a multilayered fashion with sutures. Tissue redundancies were excised, and the epidermal edges were apposed without significant tension and sutured with sutures.
Nasolabial Transposition Flap Text: The defect edges were debeveled with a #15 scalpel blade.  Given the size, depth and location of the defect and the proximity to free margins a nasolabial transposition flap was deemed most appropriate. Using a sterile surgical marker, an appropriate flap was drawn incorporating the defect. The area thus outlined was incised with a #15 scalpel blade. The skin margins were undermined to an appropriate distance in all directions utilizing iris scissors. Following this, the designed flap was carried into the primary defect and sutured into place.
Orbicularis Oris Muscle Flap Text: The defect edges were debeveled with a #15 scalpel blade.  Given that the defect affected the competency of the oral sphincter an obicularis oris muscle flap was deemed most appropriate to restore this competency and normal muscle function.  Using a sterile surgical marker, an appropriate flap was drawn incorporating the defect. The area thus outlined was incised with a #15 scalpel blade.
Melolabial Transposition Flap Text: The defect edges were debeveled with a #15 scalpel blade.  Given the location of the defect and the proximity to free margins a melolabial flap was deemed most appropriate.  Using a sterile surgical marker, an appropriate melolabial transposition flap was drawn incorporating the defect.    The area thus outlined was incised deep to adipose tissue with a #15 scalpel blade.  The skin margins were undermined to an appropriate distance in all directions utilizing iris scissors.
Rectangular Flap Text: The defect edges were debeveled with a #15 scalpel blade. Given the location of the defect and the proximity to free margins a rectangular flap was deemed most appropriate. Using a sterile surgical marker, an appropriate rectangular flap was drawn incorporating the defect. The area thus outlined was incised deep to adipose tissue with a #15 scalpel blade. The skin margins were undermined to an appropriate distance in all directions utilizing iris scissors. Following this, the designed flap was carried over into the primary defect and sutured into place.
Rhombic Flap Text: The defect edges were debeveled with a #15 scalpel blade.  Given the location of the defect and the proximity to free margins a rhombic flap was deemed most appropriate.  Using a sterile surgical marker, an appropriate rhombic flap was drawn incorporating the defect.    The area thus outlined was incised deep to adipose tissue with a #15 scalpel blade.  The skin margins were undermined to an appropriate distance in all directions utilizing iris scissors.
Rhomboid Transposition Flap Text: The defect edges were debeveled with a #15 scalpel blade.  Given the location of the defect and the proximity to free margins a rhomboid transposition flap was deemed most appropriate.  Using a sterile surgical marker, an appropriate rhomboid flap was drawn incorporating the defect.    The area thus outlined was incised deep to adipose tissue with a #15 scalpel blade.  The skin margins were undermined to an appropriate distance in all directions utilizing iris scissors.
Bi-Rhombic Flap Text: The defect edges were debeveled with a #15 scalpel blade.  Given the location of the defect and the proximity to free margins a bi-rhombic flap was deemed most appropriate.  Using a sterile surgical marker, an appropriate rhombic flap was drawn incorporating the defect. The area thus outlined was incised deep to adipose tissue with a #15 scalpel blade.  The skin margins were undermined to an appropriate distance in all directions utilizing iris scissors.
Helical Rim Advancement Flap Text: The defect edges were debeveled with a #15 blade scalpel.  Given the location of the defect and the proximity to free margins (helical rim) a double helical rim advancement flap was deemed most appropriate.  Using a sterile surgical marker, the appropriate advancement flaps were drawn incorporating the defect and placing the expected incisions between the helical rim and antihelix where possible.  The area thus outlined was incised through and through with a #15 scalpel blade.  With a skin hook and iris scissors, the flaps were gently and sharply undermined and freed up.
Bilateral Helical Rim Advancement Flap Text: The defect edges were debeveled with a #15 blade scalpel.  Given the location of the defect and the proximity to free margins (helical rim) a bilateral helical rim advancement flap was deemed most appropriate.  Using a sterile surgical marker, the appropriate advancement flaps were drawn incorporating the defect and placing the expected incisions between the helical rim and antihelix where possible.  The area thus outlined was incised through and through with a #15 scalpel blade.  With a skin hook and iris scissors, the flaps were gently and sharply undermined and freed up.
Ear Star Wedge Flap Text: The defect edges were debeveled with a #15 blade scalpel.  Given the location of the defect and the proximity to free margins (helical rim) an ear star wedge flap was deemed most appropriate.  Using a sterile surgical marker, the appropriate flap was drawn incorporating the defect and placing the expected incisions between the helical rim and antihelix where possible.  The area thus outlined was incised through and through with a #15 scalpel blade.
Banner Transposition Flap Text: The defect edges were debeveled with a #15 scalpel blade.  Given the location of the defect and the proximity to free margins a Banner transposition flap was deemed most appropriate.  Using a sterile surgical marker, an appropriate flap drawn around the defect. The area thus outlined was incised deep to adipose tissue with a #15 scalpel blade.  The skin margins were undermined to an appropriate distance in all directions utilizing iris scissors.
Bilobed Flap Text: The defect edges were debeveled with a #15 scalpel blade.  Given the location of the defect and the proximity to free margins a bilobe flap was deemed most appropriate.  Using a sterile surgical marker, an appropriate bilobe flap drawn around the defect.    The area thus outlined was incised deep to adipose tissue with a #15 scalpel blade.  The skin margins were undermined to an appropriate distance in all directions utilizing iris scissors.
Bilobed Transposition Flap Text: The defect edges were debeveled with a #15 scalpel blade.  Given the location of the defect and the proximity to free margins a bilobed transposition flap was deemed most appropriate.  Using a sterile surgical marker, an appropriate bilobe flap drawn around the defect.    The area thus outlined was incised deep to adipose tissue with a #15 scalpel blade.  The skin margins were undermined to an appropriate distance in all directions utilizing iris scissors.
Trilobed Flap Text: The defect edges were debeveled with a #15 scalpel blade.  Given the location of the defect and the proximity to free margins a trilobed flap was deemed most appropriate.  Using a sterile surgical marker, an appropriate trilobed flap drawn around the defect.    The area thus outlined was incised deep to adipose tissue with a #15 scalpel blade.  The skin margins were undermined to an appropriate distance in all directions utilizing iris scissors.
Dorsal Nasal Flap Text: The defect edges were debeveled with a #15 scalpel blade.  Given the location of the defect and the proximity to free margins a dorsal nasal flap was deemed most appropriate.  Using a sterile surgical marker, an appropriate dorsal nasal flap was drawn around the defect.    The area thus outlined was incised deep to adipose tissue with a #15 scalpel blade.  The skin margins were undermined to an appropriate distance in all directions utilizing iris scissors.
Island Pedicle Flap Text: The defect edges were debeveled with a #15 scalpel blade.  Given the location of the defect, shape of the defect and the proximity to free margins an island pedicle advancement flap was deemed most appropriate.  Using a sterile surgical marker, an appropriate advancement flap was drawn incorporating the defect, outlining the appropriate donor tissue and placing the expected incisions within the relaxed skin tension lines where possible.    The area thus outlined was incised deep to adipose tissue with a #15 scalpel blade.  The skin margins were undermined to an appropriate distance in all directions around the primary defect and laterally outward around the island pedicle utilizing iris scissors.  There was minimal undermining beneath the pedicle flap.
Island Pedicle Flap With Canthal Suspension Text: The defect edges were debeveled with a #15 scalpel blade.  Given the location of the defect, shape of the defect and the proximity to free margins an island pedicle advancement flap was deemed most appropriate.  Using a sterile surgical marker, an appropriate advancement flap was drawn incorporating the defect, outlining the appropriate donor tissue and placing the expected incisions within the relaxed skin tension lines where possible. The area thus outlined was incised deep to adipose tissue with a #15 scalpel blade.  The skin margins were undermined to an appropriate distance in all directions around the primary defect and laterally outward around the island pedicle utilizing iris scissors.  There was minimal undermining beneath the pedicle flap. A suspension suture was placed in the canthal tendon to prevent tension and prevent ectropion.
Alar Island Pedicle Flap Text: The defect edges were debeveled with a #15 scalpel blade.  Given the location of the defect, shape of the defect and the proximity to the alar rim an island pedicle advancement flap was deemed most appropriate.  Using a sterile surgical marker, an appropriate advancement flap was drawn incorporating the defect, outlining the appropriate donor tissue and placing the expected incisions within the nasal ala running parallel to the alar rim. The area thus outlined was incised with a #15 scalpel blade.  The skin margins were undermined minimally to an appropriate distance in all directions around the primary defect and laterally outward around the island pedicle utilizing iris scissors.  There was minimal undermining beneath the pedicle flap.
Double Island Pedicle Flap Text: The defect edges were debeveled with a #15 scalpel blade.  Given the location of the defect, shape of the defect and the proximity to free margins a double island pedicle advancement flap was deemed most appropriate.  Using a sterile surgical marker, an appropriate advancement flap was drawn incorporating the defect, outlining the appropriate donor tissue and placing the expected incisions within the relaxed skin tension lines where possible.    The area thus outlined was incised deep to adipose tissue with a #15 scalpel blade.  The skin margins were undermined to an appropriate distance in all directions around the primary defect and laterally outward around the island pedicle utilizing iris scissors.  There was minimal undermining beneath the pedicle flap.
Island Pedicle Flap-Requiring Vessel Identification Text: The defect edges were debeveled with a #15 scalpel blade.  Given the location of the defect, shape of the defect and the proximity to free margins an island pedicle advancement flap was deemed most appropriate.  Using a sterile surgical marker, an appropriate advancement flap was drawn, based on the axial vessel mentioned above, incorporating the defect, outlining the appropriate donor tissue and placing the expected incisions within the relaxed skin tension lines where possible.    The area thus outlined was incised deep to adipose tissue with a #15 scalpel blade.  The skin margins were undermined to an appropriate distance in all directions around the primary defect and laterally outward around the island pedicle utilizing iris scissors.  There was minimal undermining beneath the pedicle flap.
Keystone Flap Text: The defect edges were debeveled with a #15 scalpel blade.  Given the location of the defect, shape of the defect a keystone flap was deemed most appropriate.  Using a sterile surgical marker, an appropriate keystone flap was drawn incorporating the defect, outlining the appropriate donor tissue and placing the expected incisions within the relaxed skin tension lines where possible. The area thus outlined was incised deep to adipose tissue with a #15 scalpel blade.  The skin margins were undermined to an appropriate distance in all directions around the primary defect and laterally outward around the flap utilizing iris scissors.
O-T Plasty Text: The defect edges were debeveled with a #15 scalpel blade.  Given the location of the defect, shape of the defect and the proximity to free margins an O-T plasty was deemed most appropriate.  Using a sterile surgical marker, an appropriate O-T plasty was drawn incorporating the defect and placing the expected incisions within the relaxed skin tension lines where possible.    The area thus outlined was incised deep to adipose tissue with a #15 scalpel blade.  The skin margins were undermined to an appropriate distance in all directions utilizing iris scissors.
O-Z Plasty Text: The defect edges were debeveled with a #15 scalpel blade.  Given the location of the defect, shape of the defect and the proximity to free margins an O-Z plasty (double transposition flap) was deemed most appropriate.  Using a sterile surgical marker, the appropriate transposition flaps were drawn incorporating the defect and placing the expected incisions within the relaxed skin tension lines where possible.    The area thus outlined was incised deep to adipose tissue with a #15 scalpel blade.  The skin margins were undermined to an appropriate distance in all directions utilizing iris scissors.  Hemostasis was achieved with electrocautery.  The flaps were then transposed into place, one clockwise and the other counterclockwise, and anchored with interrupted buried subcutaneous sutures.
Double O-Z Plasty Text: The defect edges were debeveled with a #15 scalpel blade.  Given the location of the defect, shape of the defect and the proximity to free margins a Double O-Z plasty (double transposition flap) was deemed most appropriate.  Using a sterile surgical marker, the appropriate transposition flaps were drawn incorporating the defect and placing the expected incisions within the relaxed skin tension lines where possible. The area thus outlined was incised deep to adipose tissue with a #15 scalpel blade.  The skin margins were undermined to an appropriate distance in all directions utilizing iris scissors.  Hemostasis was achieved with electrocautery.  The flaps were then transposed into place, one clockwise and the other counterclockwise, and anchored with interrupted buried subcutaneous sutures.
V-Y Plasty Text: The defect edges were debeveled with a #15 scalpel blade.  Given the location of the defect, shape of the defect and the proximity to free margins an V-Y advancement flap was deemed most appropriate.  Using a sterile surgical marker, an appropriate advancement flap was drawn incorporating the defect and placing the expected incisions within the relaxed skin tension lines where possible.    The area thus outlined was incised deep to adipose tissue with a #15 scalpel blade.  The skin margins were undermined to an appropriate distance in all directions utilizing iris scissors.
H Plasty Text: Given the location of the defect, shape of the defect and the proximity to free margins a H-plasty was deemed most appropriate for repair.  Using a sterile surgical marker, the appropriate advancement arms of the H-plasty were drawn incorporating the defect and placing the expected incisions within the relaxed skin tension lines where possible. The area thus outlined was incised deep to adipose tissue with a #15 scalpel blade. The skin margins were undermined to an appropriate distance in all directions utilizing iris scissors.  The opposing advancement arms were then advanced into place in opposite direction and anchored with interrupted buried subcutaneous sutures.
W Plasty Text: The lesion was extirpated to the level of the fat with a #15 scalpel blade.  Given the location of the defect, shape of the defect and the proximity to free margins a W-plasty was deemed most appropriate for repair.  Using a sterile surgical marker, the appropriate transposition arms of the W-plasty were drawn incorporating the defect and placing the expected incisions within the relaxed skin tension lines where possible.    The area thus outlined was incised deep to adipose tissue with a #15 scalpel blade.  The skin margins were undermined to an appropriate distance in all directions utilizing iris scissors.  The opposing transposition arms were then transposed into place in opposite direction and anchored with interrupted buried subcutaneous sutures.
Z Plasty Text: The lesion was extirpated to the level of the fat with a #15 scalpel blade.  Given the location of the defect, shape of the defect and the proximity to free margins a Z-plasty was deemed most appropriate for repair.  Using a sterile surgical marker, the appropriate transposition arms of the Z-plasty were drawn incorporating the defect and placing the expected incisions within the relaxed skin tension lines where possible.    The area thus outlined was incised deep to adipose tissue with a #15 scalpel blade.  The skin margins were undermined to an appropriate distance in all directions utilizing iris scissors.  The opposing transposition arms were then transposed into place in opposite direction and anchored with interrupted buried subcutaneous sutures.
Double Z Plasty Text: The lesion was extirpated to the level of the fat with a #15 scalpel blade. Given the location of the defect, shape of the defect and the proximity to free margins a double Z-plasty was deemed most appropriate for repair. Using a sterile surgical marker, the appropriate transposition arms of the double Z-plasty were drawn incorporating the defect and placing the expected incisions within the relaxed skin tension lines where possible. The area thus outlined was incised deep to adipose tissue with a #15 scalpel blade. The skin margins were undermined to an appropriate distance in all directions utilizing iris scissors. The opposing transposition arms were then transposed and carried over into place in opposite direction and anchored with interrupted buried subcutaneous sutures.
Zygomaticofacial Flap Text: Given the location of the defect, shape of the defect and the proximity to free margins a zygomaticofacial flap was deemed most appropriate for repair.  Using a sterile surgical marker, the appropriate flap was drawn incorporating the defect and placing the expected incisions within the relaxed skin tension lines where possible. The area thus outlined was incised deep to adipose tissue with a #15 scalpel blade with preservation of a vascular pedicle.  The skin margins were undermined to an appropriate distance in all directions utilizing iris scissors.  The flap was then placed into the defect and anchored with interrupted buried subcutaneous sutures.
Cheek Interpolation Flap Text: A decision was made to reconstruct the defect utilizing an interpolation axial flap and a staged reconstruction.  A telfa template was made of the defect.  This telfa template was then used to outline the Cheek Interpolation flap.  The donor area for the pedicle flap was then injected with anesthesia.  The flap was excised through the skin and subcutaneous tissue down to the layer of the underlying musculature.  The interpolation flap was carefully excised within this deep plane to maintain its blood supply.  The edges of the donor site were undermined.   The donor site was closed in a primary fashion.  The pedicle was then rotated into position and sutured.  Once the tube was sutured into place, adequate blood supply was confirmed with blanching and refill.  The pedicle was then wrapped with xeroform gauze and dressed appropriately with a telfa and gauze bandage to ensure continued blood supply and protect the attached pedicle.
Cheek-To-Nose Interpolation Flap Text: A decision was made to reconstruct the defect utilizing an interpolation axial flap and a staged reconstruction.  A telfa template was made of the defect.  This telfa template was then used to outline the Cheek-To-Nose Interpolation flap.  The donor area for the pedicle flap was then injected with anesthesia.  The flap was excised through the skin and subcutaneous tissue down to the layer of the underlying musculature.  The interpolation flap was carefully excised within this deep plane to maintain its blood supply.  The edges of the donor site were undermined.   The donor site was closed in a primary fashion.  The pedicle was then rotated into position and sutured.  Once the tube was sutured into place, adequate blood supply was confirmed with blanching and refill.  The pedicle was then wrapped with xeroform gauze and dressed appropriately with a telfa and gauze bandage to ensure continued blood supply and protect the attached pedicle.
Interpolation Flap Text: A decision was made to reconstruct the defect utilizing an interpolation axial flap and a staged reconstruction.  A telfa template was made of the defect.  This telfa template was then used to outline the interpolation flap.  The donor area for the pedicle flap was then injected with anesthesia.  The flap was excised through the skin and subcutaneous tissue down to the layer of the underlying musculature.  The interpolation flap was carefully excised within this deep plane to maintain its blood supply.  The edges of the donor site were undermined.   The donor site was closed in a primary fashion.  The pedicle was then rotated into position and sutured.  Once the tube was sutured into place, adequate blood supply was confirmed with blanching and refill.  The pedicle was then wrapped with xeroform gauze and dressed appropriately with a telfa and gauze bandage to ensure continued blood supply and protect the attached pedicle.
Melolabial Interpolation Flap Text: A decision was made to reconstruct the defect utilizing an interpolation axial flap and a staged reconstruction.  A telfa template was made of the defect.  This telfa template was then used to outline the melolabial interpolation flap.  The donor area for the pedicle flap was then injected with anesthesia.  The flap was excised through the skin and subcutaneous tissue down to the layer of the underlying musculature.  The pedicle flap was carefully excised within this deep plane to maintain its blood supply.  The edges of the donor site were undermined.   The donor site was closed in a primary fashion.  The pedicle was then rotated into position and sutured.  Once the tube was sutured into place, adequate blood supply was confirmed with blanching and refill.  The pedicle was then wrapped with xeroform gauze and dressed appropriately with a telfa and gauze bandage to ensure continued blood supply and protect the attached pedicle.
Mastoid Interpolation Flap Text: A decision was made to reconstruct the defect utilizing an interpolation axial flap and a staged reconstruction.  A telfa template was made of the defect.  This telfa template was then used to outline the mastoid interpolation flap.  The donor area for the pedicle flap was then injected with anesthesia.  The flap was excised through the skin and subcutaneous tissue down to the layer of the underlying musculature.  The pedicle flap was carefully excised within this deep plane to maintain its blood supply.  The edges of the donor site were undermined.   The donor site was closed in a primary fashion.  The pedicle was then rotated into position and sutured.  Once the tube was sutured into place, adequate blood supply was confirmed with blanching and refill.  The pedicle was then wrapped with xeroform gauze and dressed appropriately with a telfa and gauze bandage to ensure continued blood supply and protect the attached pedicle.
Posterior Auricular Interpolation Flap Text: A decision was made to reconstruct the defect utilizing an interpolation axial flap and a staged reconstruction.  A telfa template was made of the defect.  This telfa template was then used to outline the posterior auricular interpolation flap.  The donor area for the pedicle flap was then injected with anesthesia.  The flap was excised through the skin and subcutaneous tissue down to the layer of the underlying musculature.  The pedicle flap was carefully excised within this deep plane to maintain its blood supply.  The edges of the donor site were undermined.   The donor site was closed in a primary fashion.  The pedicle was then rotated into position and sutured.  Once the tube was sutured into place, adequate blood supply was confirmed with blanching and refill.  The pedicle was then wrapped with xeroform gauze and dressed appropriately with a telfa and gauze bandage to ensure continued blood supply and protect the attached pedicle.
Paramedian Forehead Flap Text: A decision was made to reconstruct the defect utilizing an interpolation axial flap and a staged reconstruction.  A telfa template was made of the defect.  This telfa template was then used to outline the paramedian forehead pedicle flap.  The donor area for the pedicle flap was then injected with anesthesia.  The flap was excised through the skin and subcutaneous tissue down to the layer of the underlying musculature.  The pedicle flap was carefully excised within this deep plane to maintain its blood supply.  The edges of the donor site were undermined.   The donor site was closed in a primary fashion.  The pedicle was then rotated into position and sutured.  Once the tube was sutured into place, adequate blood supply was confirmed with blanching and refill.  The pedicle was then wrapped with xeroform gauze and dressed appropriately with a telfa and gauze bandage to ensure continued blood supply and protect the attached pedicle.
Abbe Flap (Upper To Lower Lip) Text: The defect of the lower lip was assessed and measured.  Given the location and size of the defect, an Abbe flap was deemed most appropriate. Using a sterile surgical marker, an appropriate Abbe flap was measured and drawn on the upper lip. Local anesthesia was then infiltrated.  A scalpel was then used to incise the upper lip through and through the skin, vermilion, muscle and mucosa, leaving the flap pedicled on the opposite side.  The flap was then rotated and transferred to the lower lip defect.  The flap was then sutured into place with a three layer technique, closing the orbicularis oris muscle layer with subcutaneous buried sutures, followed by a mucosal layer and an epidermal layer.
Abbe Flap (Lower To Upper Lip) Text: The defect of the upper lip was assessed and measured.  Given the location and size of the defect, an Abbe flap was deemed most appropriate. Using a sterile surgical marker, an appropriate Abbe flap was measured and drawn on the lower lip. Local anesthesia was then infiltrated. A scalpel was then used to incise the upper lip through and through the skin, vermilion, muscle and mucosa, leaving the flap pedicled on the opposite side.  The flap was then rotated and transferred to the lower lip defect.  The flap was then sutured into place with a three layer technique, closing the orbicularis oris muscle layer with subcutaneous buried sutures, followed by a mucosal layer and an epidermal layer.
Estlander Flap (Upper To Lower Lip) Text: The defect of the lower lip was assessed and measured.  Given the location and size of the defect, an Estlander flap was deemed most appropriate. Using a sterile surgical marker, an appropriate Estlander flap was measured and drawn on the upper lip. Local anesthesia was then infiltrated. A scalpel was then used to incise the lateral aspect of the flap, through skin, muscle and mucosa, leaving the flap pedicled medially.  The flap was then rotated and positioned to fill the lower lip defect.  The flap was then sutured into place with a three layer technique, closing the orbicularis oris muscle layer with subcutaneous buried sutures, followed by a mucosal layer and an epidermal layer.
Lip Wedge Excision Repair Text: Given the location of the defect and the proximity to free margins a full thickness wedge repair was deemed most appropriate.  Using a sterile surgical marker, the appropriate repair was drawn incorporating the defect and placing the expected incisions perpendicular to the vermilion border.  The vermilion border was also meticulously outlined to ensure appropriate reapproximation during the repair.  The area thus outlined was incised through and through with a #15 scalpel blade.  The muscularis and dermis were reaproximated with deep sutures following hemostasis. Care was taken to realign the vermilion border before proceeding with the superficial closure.  Once the vermilion was realigned the superfical and mucosal closure was finished.
Ftsg Text: The defect edges were debeveled with a #15 scalpel blade.  Given the location of the defect, shape of the defect and the proximity to free margins a full thickness skin graft was deemed most appropriate.  Using a sterile surgical marker, the primary defect shape was transferred to the donor site. The area thus outlined was incised deep to adipose tissue with a #15 scalpel blade.  The harvested graft was then trimmed of adipose tissue until only dermis and epidermis was left.  The skin margins of the secondary defect were undermined to an appropriate distance in all directions utilizing iris scissors.  The secondary defect was closed with interrupted buried subcutaneous sutures.  The skin edges were then re-apposed with running  sutures.  The skin graft was then placed in the primary defect and oriented appropriately.
Split-Thickness Skin Graft Text: The defect edges were debeveled with a #15 scalpel blade.  Given the location of the defect, shape of the defect and the proximity to free margins a split thickness skin graft was deemed most appropriate.  Using a sterile surgical marker, the primary defect shape was transferred to the donor site. The split thickness graft was then harvested.  The skin graft was then placed in the primary defect and oriented appropriately.
Pinch Graft Text: The defect edges were debeveled with a #15 scalpel blade. Given the location of the defect, shape of the defect and the proximity to free margins a pinch graft was deemed most appropriate. Using a sterile surgical marker, the primary defect shape was transferred to the donor site. The area thus outlined was incised deep to adipose tissue with a #15 scalpel blade.  The harvested graft was then trimmed of adipose tissue until only dermis and epidermis was left. The skin margins of the secondary defect were undermined to an appropriate distance in all directions utilizing iris scissors.  The secondary defect was closed with interrupted buried subcutaneous sutures.  The skin edges were then re-apposed with running  sutures.  The skin graft was then placed in the primary defect and oriented appropriately.
Burow's Graft Text: The defect edges were debeveled with a #15 scalpel blade.  Given the location of the defect, shape of the defect, the proximity to free margins and the presence of a standing cone deformity a Burow's skin graft was deemed most appropriate. The standing cone was removed and this tissue was then trimmed to the shape of the primary defect. The adipose tissue was also removed until only dermis and epidermis were left.  The skin margins of the secondary defect were undermined to an appropriate distance in all directions utilizing iris scissors.  The secondary defect was closed with interrupted buried subcutaneous sutures.  The skin edges were then re-apposed with running  sutures.  The skin graft was then placed in the primary defect and oriented appropriately.
Cartilage Graft Text: The defect edges were debeveled with a #15 scalpel blade.  Given the location of the defect, shape of the defect, the fact the defect involved a full thickness cartilage defect a cartilage graft was deemed most appropriate.  An appropriate donor site was identified, cleansed, and anesthetized. The cartilage graft was then harvested and transferred to the recipient site, oriented appropriately and then sutured into place.  The secondary defect was then repaired using a primary closure.
Composite Graft Text: The defect edges were debeveled with a #15 scalpel blade.  Given the location of the defect, shape of the defect, the proximity to free margins and the fact the defect was full thickness a composite graft was deemed most appropriate.  The defect was outline and then transferred to the donor site.  A full thickness graft was then excised from the donor site. The graft was then placed in the primary defect, oriented appropriately and then sutured into place.  The secondary defect was then repaired using a primary closure.
Epidermal Autograft Text: The defect edges were debeveled with a #15 scalpel blade.  Given the location of the defect, shape of the defect and the proximity to free margins an epidermal autograft was deemed most appropriate.  Using a sterile surgical marker, the primary defect shape was transferred to the donor site. The epidermal graft was then harvested.  The skin graft was then placed in the primary defect and oriented appropriately.
Dermal Autograft Text: The defect edges were debeveled with a #15 scalpel blade.  Given the location of the defect, shape of the defect and the proximity to free margins a dermal autograft was deemed most appropriate.  Using a sterile surgical marker, the primary defect shape was transferred to the donor site. The area thus outlined was incised deep to adipose tissue with a #15 scalpel blade.  The harvested graft was then trimmed of adipose and epidermal tissue until only dermis was left.  The skin graft was then placed in the primary defect and oriented appropriately.
Skin Substitute Text: The defect edges were debeveled with a #15 scalpel blade.  Given the location of the defect, shape of the defect and the proximity to free margins a skin substitute graft was deemed most appropriate.  The graft material was trimmed to fit the size of the defect. The graft was then placed in the primary defect and oriented appropriately.
Tissue Cultured Epidermal Autograft Text: The defect edges were debeveled with a #15 scalpel blade.  Given the location of the defect, shape of the defect and the proximity to free margins a tissue cultured epidermal autograft was deemed most appropriate.  The graft was then trimmed to fit the size of the defect.  The graft was then placed in the primary defect and oriented appropriately.
Xenograft Text: The defect edges were debeveled with a #15 scalpel blade.  Given the location of the defect, shape of the defect and the proximity to free margins a xenograft was deemed most appropriate.  The graft was then trimmed to fit the size of the defect.  The graft was then placed in the primary defect and oriented appropriately.
Purse String (Intermediate) Text: Given the location of the defect and the characteristics of the surrounding skin a purse string intermediate closure was deemed most appropriate.  Undermining was performed circumferentially around the surgical defect.  A purse string suture was then placed and tightened.
Purse String (Simple) Text: Given the location of the defect and the characteristics of the surrounding skin a purse string simple closure was deemed most appropriate.  Undermining was performed circumferentially around the surgical defect.  A purse string suture was then placed and tightened.
Complex Repair And Single Advancement Flap Text: The defect edges were debeveled with a #15 scalpel blade.  The primary defect was closed partially with a complex linear closure.  Given the location of the remaining defect, shape of the defect and the proximity to free margins a single advancement flap was deemed most appropriate for complete closure of the defect.  Using a sterile surgical marker, an appropriate advancement flap was drawn incorporating the defect and placing the expected incisions within the relaxed skin tension lines where possible.    The area thus outlined was incised deep to adipose tissue with a #15 scalpel blade.  The skin margins were undermined to an appropriate distance in all directions utilizing iris scissors.
Complex Repair And Double Advancement Flap Text: The defect edges were debeveled with a #15 scalpel blade.  The primary defect was closed partially with a complex linear closure.  Given the location of the remaining defect, shape of the defect and the proximity to free margins a double advancement flap was deemed most appropriate for complete closure of the defect.  Using a sterile surgical marker, an appropriate advancement flap was drawn incorporating the defect and placing the expected incisions within the relaxed skin tension lines where possible.    The area thus outlined was incised deep to adipose tissue with a #15 scalpel blade.  The skin margins were undermined to an appropriate distance in all directions utilizing iris scissors.
Complex Repair And Modified Advancement Flap Text: The defect edges were debeveled with a #15 scalpel blade.  The primary defect was closed partially with a complex linear closure.  Given the location of the remaining defect, shape of the defect and the proximity to free margins a modified advancement flap was deemed most appropriate for complete closure of the defect.  Using a sterile surgical marker, an appropriate advancement flap was drawn incorporating the defect and placing the expected incisions within the relaxed skin tension lines where possible.    The area thus outlined was incised deep to adipose tissue with a #15 scalpel blade.  The skin margins were undermined to an appropriate distance in all directions utilizing iris scissors.
Complex Repair And A-T Advancement Flap Text: The defect edges were debeveled with a #15 scalpel blade.  The primary defect was closed partially with a complex linear closure.  Given the location of the remaining defect, shape of the defect and the proximity to free margins an A-T advancement flap was deemed most appropriate for complete closure of the defect.  Using a sterile surgical marker, an appropriate advancement flap was drawn incorporating the defect and placing the expected incisions within the relaxed skin tension lines where possible.    The area thus outlined was incised deep to adipose tissue with a #15 scalpel blade.  The skin margins were undermined to an appropriate distance in all directions utilizing iris scissors.
Complex Repair And O-T Advancement Flap Text: The defect edges were debeveled with a #15 scalpel blade.  The primary defect was closed partially with a complex linear closure.  Given the location of the remaining defect, shape of the defect and the proximity to free margins an O-T advancement flap was deemed most appropriate for complete closure of the defect.  Using a sterile surgical marker, an appropriate advancement flap was drawn incorporating the defect and placing the expected incisions within the relaxed skin tension lines where possible.    The area thus outlined was incised deep to adipose tissue with a #15 scalpel blade.  The skin margins were undermined to an appropriate distance in all directions utilizing iris scissors.
Complex Repair And O-L Flap Text: The defect edges were debeveled with a #15 scalpel blade.  The primary defect was closed partially with a complex linear closure.  Given the location of the remaining defect, shape of the defect and the proximity to free margins an O-L flap was deemed most appropriate for complete closure of the defect.  Using a sterile surgical marker, an appropriate flap was drawn incorporating the defect and placing the expected incisions within the relaxed skin tension lines where possible.    The area thus outlined was incised deep to adipose tissue with a #15 scalpel blade.  The skin margins were undermined to an appropriate distance in all directions utilizing iris scissors.
Complex Repair And Bilobe Flap Text: The defect edges were debeveled with a #15 scalpel blade.  The primary defect was closed partially with a complex linear closure.  Given the location of the remaining defect, shape of the defect and the proximity to free margins a bilobe flap was deemed most appropriate for complete closure of the defect.  Using a sterile surgical marker, an appropriate advancement flap was drawn incorporating the defect and placing the expected incisions within the relaxed skin tension lines where possible.    The area thus outlined was incised deep to adipose tissue with a #15 scalpel blade.  The skin margins were undermined to an appropriate distance in all directions utilizing iris scissors.
Complex Repair And Melolabial Flap Text: The defect edges were debeveled with a #15 scalpel blade.  The primary defect was closed partially with a complex linear closure.  Given the location of the remaining defect, shape of the defect and the proximity to free margins a melolabial flap was deemed most appropriate for complete closure of the defect.  Using a sterile surgical marker, an appropriate advancement flap was drawn incorporating the defect and placing the expected incisions within the relaxed skin tension lines where possible.    The area thus outlined was incised deep to adipose tissue with a #15 scalpel blade.  The skin margins were undermined to an appropriate distance in all directions utilizing iris scissors.
Complex Repair And Rotation Flap Text: The defect edges were debeveled with a #15 scalpel blade.  The primary defect was closed partially with a complex linear closure.  Given the location of the remaining defect, shape of the defect and the proximity to free margins a rotation flap was deemed most appropriate for complete closure of the defect.  Using a sterile surgical marker, an appropriate advancement flap was drawn incorporating the defect and placing the expected incisions within the relaxed skin tension lines where possible.    The area thus outlined was incised deep to adipose tissue with a #15 scalpel blade.  The skin margins were undermined to an appropriate distance in all directions utilizing iris scissors.
Complex Repair And Rhombic Flap Text: The defect edges were debeveled with a #15 scalpel blade.  The primary defect was closed partially with a complex linear closure.  Given the location of the remaining defect, shape of the defect and the proximity to free margins a rhombic flap was deemed most appropriate for complete closure of the defect.  Using a sterile surgical marker, an appropriate advancement flap was drawn incorporating the defect and placing the expected incisions within the relaxed skin tension lines where possible.    The area thus outlined was incised deep to adipose tissue with a #15 scalpel blade.  The skin margins were undermined to an appropriate distance in all directions utilizing iris scissors.
Complex Repair And Transposition Flap Text: The defect edges were debeveled with a #15 scalpel blade.  The primary defect was closed partially with a complex linear closure.  Given the location of the remaining defect, shape of the defect and the proximity to free margins a transposition flap was deemed most appropriate for complete closure of the defect.  Using a sterile surgical marker, an appropriate advancement flap was drawn incorporating the defect and placing the expected incisions within the relaxed skin tension lines where possible.    The area thus outlined was incised deep to adipose tissue with a #15 scalpel blade.  The skin margins were undermined to an appropriate distance in all directions utilizing iris scissors.
Complex Repair And V-Y Plasty Text: The defect edges were debeveled with a #15 scalpel blade.  The primary defect was closed partially with a complex linear closure.  Given the location of the remaining defect, shape of the defect and the proximity to free margins a V-Y plasty was deemed most appropriate for complete closure of the defect.  Using a sterile surgical marker, an appropriate advancement flap was drawn incorporating the defect and placing the expected incisions within the relaxed skin tension lines where possible.    The area thus outlined was incised deep to adipose tissue with a #15 scalpel blade.  The skin margins were undermined to an appropriate distance in all directions utilizing iris scissors.
Complex Repair And M Plasty Text: The defect edges were debeveled with a #15 scalpel blade.  The primary defect was closed partially with a complex linear closure.  Given the location of the remaining defect, shape of the defect and the proximity to free margins an M plasty was deemed most appropriate for complete closure of the defect.  Using a sterile surgical marker, an appropriate advancement flap was drawn incorporating the defect and placing the expected incisions within the relaxed skin tension lines where possible.    The area thus outlined was incised deep to adipose tissue with a #15 scalpel blade.  The skin margins were undermined to an appropriate distance in all directions utilizing iris scissors.
Complex Repair And Double M Plasty Text: The defect edges were debeveled with a #15 scalpel blade.  The primary defect was closed partially with a complex linear closure.  Given the location of the remaining defect, shape of the defect and the proximity to free margins a double M plasty was deemed most appropriate for complete closure of the defect.  Using a sterile surgical marker, an appropriate advancement flap was drawn incorporating the defect and placing the expected incisions within the relaxed skin tension lines where possible.    The area thus outlined was incised deep to adipose tissue with a #15 scalpel blade.  The skin margins were undermined to an appropriate distance in all directions utilizing iris scissors.
Complex Repair And W Plasty Text: The defect edges were debeveled with a #15 scalpel blade.  The primary defect was closed partially with a complex linear closure.  Given the location of the remaining defect, shape of the defect and the proximity to free margins a W plasty was deemed most appropriate for complete closure of the defect.  Using a sterile surgical marker, an appropriate advancement flap was drawn incorporating the defect and placing the expected incisions within the relaxed skin tension lines where possible.    The area thus outlined was incised deep to adipose tissue with a #15 scalpel blade.  The skin margins were undermined to an appropriate distance in all directions utilizing iris scissors.
Complex Repair And Z Plasty Text: The defect edges were debeveled with a #15 scalpel blade.  The primary defect was closed partially with a complex linear closure.  Given the location of the remaining defect, shape of the defect and the proximity to free margins a Z plasty was deemed most appropriate for complete closure of the defect.  Using a sterile surgical marker, an appropriate advancement flap was drawn incorporating the defect and placing the expected incisions within the relaxed skin tension lines where possible.    The area thus outlined was incised deep to adipose tissue with a #15 scalpel blade.  The skin margins were undermined to an appropriate distance in all directions utilizing iris scissors.
Complex Repair And Dorsal Nasal Flap Text: The defect edges were debeveled with a #15 scalpel blade.  The primary defect was closed partially with a complex linear closure.  Given the location of the remaining defect, shape of the defect and the proximity to free margins a dorsal nasal flap was deemed most appropriate for complete closure of the defect.  Using a sterile surgical marker, an appropriate flap was drawn incorporating the defect and placing the expected incisions within the relaxed skin tension lines where possible.    The area thus outlined was incised deep to adipose tissue with a #15 scalpel blade.  The skin margins were undermined to an appropriate distance in all directions utilizing iris scissors.
Complex Repair And Ftsg Text: The defect edges were debeveled with a #15 scalpel blade.  The primary defect was closed partially with a complex linear closure.  Given the location of the defect, shape of the defect and the proximity to free margins a full thickness skin graft was deemed most appropriate to repair the remaining defect.  The graft was trimmed to fit the size of the remaining defect.  The graft was then placed in the primary defect, oriented appropriately, and sutured into place.
Complex Repair And Burow's Graft Text: The defect edges were debeveled with a #15 scalpel blade.  The primary defect was closed partially with a complex linear closure.  Given the location of the defect, shape of the defect, the proximity to free margins and the presence of a standing cone deformity a Burow's graft was deemed most appropriate to repair the remaining defect.  The graft was trimmed to fit the size of the remaining defect.  The graft was then placed in the primary defect, oriented appropriately, and sutured into place.
Complex Repair And Split-Thickness Skin Graft Text: The defect edges were debeveled with a #15 scalpel blade.  The primary defect was closed partially with a complex linear closure.  Given the location of the defect, shape of the defect and the proximity to free margins a split thickness skin graft was deemed most appropriate to repair the remaining defect.  The graft was trimmed to fit the size of the remaining defect.  The graft was then placed in the primary defect, oriented appropriately, and sutured into place.
Complex Repair And Epidermal Autograft Text: The defect edges were debeveled with a #15 scalpel blade.  The primary defect was closed partially with a complex linear closure.  Given the location of the defect, shape of the defect and the proximity to free margins an epidermal autograft was deemed most appropriate to repair the remaining defect.  The graft was trimmed to fit the size of the remaining defect.  The graft was then placed in the primary defect, oriented appropriately, and sutured into place.
Complex Repair And Dermal Autograft Text: The defect edges were debeveled with a #15 scalpel blade.  The primary defect was closed partially with a complex linear closure.  Given the location of the defect, shape of the defect and the proximity to free margins an dermal autograft was deemed most appropriate to repair the remaining defect.  The graft was trimmed to fit the size of the remaining defect.  The graft was then placed in the primary defect, oriented appropriately, and sutured into place.
Complex Repair And Tissue Cultured Epidermal Autograft Text: The defect edges were debeveled with a #15 scalpel blade.  The primary defect was closed partially with a complex linear closure.  Given the location of the defect, shape of the defect and the proximity to free margins an tissue cultured epidermal autograft was deemed most appropriate to repair the remaining defect.  The graft was trimmed to fit the size of the remaining defect.  The graft was then placed in the primary defect, oriented appropriately, and sutured into place.
Complex Repair And Xenograft Text: The defect edges were debeveled with a #15 scalpel blade.  The primary defect was closed partially with a complex linear closure.  Given the location of the defect, shape of the defect and the proximity to free margins a xenograft was deemed most appropriate to repair the remaining defect.  The graft was trimmed to fit the size of the remaining defect.  The graft was then placed in the primary defect, oriented appropriately, and sutured into place.
Complex Repair And Skin Substitute Graft Text: The defect edges were debeveled with a #15 scalpel blade.  The primary defect was closed partially with a complex linear closure.  Given the location of the remaining defect, shape of the defect and the proximity to free margins a skin substitute graft was deemed most appropriate to repair the remaining defect.  The graft was trimmed to fit the size of the remaining defect.  The graft was then placed in the primary defect, oriented appropriately, and sutured into place.
Path Notes (To The Dermatopathologist): Please check margins.
Consent was obtained from the patient. The risks and benefits to therapy were discussed in detail. Specifically, the risks of infection, scarring, bleeding, prolonged wound healing, incomplete removal, allergy to anesthesia, nerve injury and recurrence were addressed. Prior to the procedure, the treatment site was clearly identified and confirmed by the patient. All components of Universal Protocol/PAUSE Rule completed.
Post-Care Instructions: I reviewed with the patient in detail post-care instructions:\\n1. Apply bacitracin over the steri-strips.  \\n2. Cut non-stick pad (Telfa) to cover the steri-strips\\n3. Apply tape (hypafix) over the non-stick pad\\n4. Change once per day for 5 days\\n5. Shower with bandage on, change bandage after shower\\n\\nPatient is not to engage in any heavy lifting, exercise, hot tub, or swimming for the next 14 days. Should the patient develop any fevers, chills, bleeding, severe pain patient will contact the office immediately.
Home Suture Removal Text: Patient was provided a home suture removal kit and will remove their sutures at home.  If they have any questions or difficulties they will call the office.
Where Do You Want The Question To Include Opioid Counseling Located?: Case Summary Tab
Billing Type: Third-Party Bill
Information: Selecting Yes will display possible errors in your note based on the variables you have selected. This validation is only offered as a suggestion for you. PLEASE NOTE THAT THE VALIDATION TEXT WILL BE REMOVED WHEN YOU FINALIZE YOUR NOTE. IF YOU WANT TO FAX A PRELIMINARY NOTE YOU WILL NEED TO TOGGLE THIS TO 'NO' IF YOU DO NOT WANT IT IN YOUR FAXED NOTE.

## 2024-06-12 ENCOUNTER — APPOINTMENT (RX ONLY)
Dept: URBAN - METROPOLITAN AREA CLINIC 4 | Facility: CLINIC | Age: 39
Setting detail: DERMATOLOGY
End: 2024-06-12

## 2024-06-12 ENCOUNTER — HOSPITAL ENCOUNTER (OUTPATIENT)
Facility: MEDICAL CENTER | Age: 39
End: 2024-06-12
Attending: DERMATOLOGY
Payer: COMMERCIAL

## 2024-06-12 ENCOUNTER — APPOINTMENT (OUTPATIENT)
Dept: PHYSICAL THERAPY | Facility: REHABILITATION | Age: 39
End: 2024-06-12
Attending: PHYSICAL MEDICINE & REHABILITATION
Payer: COMMERCIAL

## 2024-06-12 DIAGNOSIS — Z48.817 ENCOUNTER FOR SURGICAL AFTERCARE FOLLOWING SURGERY ON THE SKIN AND SUBCUTANEOUS TISSUE: ICD-10-CM

## 2024-06-12 PROCEDURE — ? ORDER TESTS

## 2024-06-12 PROCEDURE — 87205 SMEAR GRAM STAIN: CPT

## 2024-06-12 PROCEDURE — ? POST-OP WOUND EVALUATION

## 2024-06-12 PROCEDURE — 87075 CULTR BACTERIA EXCEPT BLOOD: CPT

## 2024-06-12 PROCEDURE — ? PRESCRIPTION

## 2024-06-12 PROCEDURE — 87070 CULTURE OTHR SPECIMN AEROBIC: CPT

## 2024-06-12 PROCEDURE — ? MEDICATION COUNSELING

## 2024-06-12 PROCEDURE — 87077 CULTURE AEROBIC IDENTIFY: CPT

## 2024-06-12 PROCEDURE — 87186 SC STD MICRODIL/AGAR DIL: CPT

## 2024-06-12 RX ORDER — CEPHALEXIN 500 MG/1
1 CAPSULE ORAL TID
Qty: 30 | Refills: 0 | Status: ERX | COMMUNITY
Start: 2024-06-12

## 2024-06-12 RX ADMIN — CEPHALEXIN 1: 500 CAPSULE ORAL at 00:00

## 2024-06-12 ASSESSMENT — LOCATION DETAILED DESCRIPTION DERM: LOCATION DETAILED: RIGHT POSTERIOR SHOULDER

## 2024-06-12 ASSESSMENT — LOCATION ZONE DERM: LOCATION ZONE: ARM

## 2024-06-12 ASSESSMENT — LOCATION SIMPLE DESCRIPTION DERM: LOCATION SIMPLE: RIGHT SHOULDER

## 2024-06-12 NOTE — PROCEDURE: ORDER TESTS
Lab Facility: [id number]
Billing Type: Third-Party Bill
Expected Date Of Service: 06/12/2024
Bill For Surgical Tray: no
Performing Laboratory: [id number]

## 2024-06-12 NOTE — PROCEDURE: POST-OP WOUND EVALUATION
Detail Level: Detailed
Add 38393 Cpt? (Important Note: In 2017 The Use Of 77961 Is Being Tracked By Cms To Determine Future Global Period Reimbursement For Global Periods): no
Wound Evaluated By (Optional): Jhoana
Wound Diameter In Cm(Optional): 0
Wound Discharge?: moderate discharge
Sutures?: intact
Wound Edema?: moderate
Wound Drainage?: purulent drainage
Wound Color?: red

## 2024-06-12 NOTE — PROCEDURE: MEDICATION COUNSELING
Rinvoq Counseling: I discussed with the patient the risks of Rinvoq therapy including but not limited to upper respiratory tract infections, shingles, cold sores, bronchitis, nausea, cough, fever, acne, and headache. Live vaccines should be avoided.  This medication has been linked to serious infections; higher rate of mortality; malignancy and lymphoproliferative disorders; major adverse cardiovascular events; thrombosis; thrombocytopenia, anemia, and neutropenia; lipid elevations; liver enzyme elevations; and gastrointestinal perforations.
Solaraze Pregnancy And Lactation Text: This medication is Pregnancy Category B and is considered safe. There is some data to suggest avoiding during the third trimester. It is unknown if this medication is excreted in breast milk.
5-Fu Pregnancy And Lactation Text: This medication is Pregnancy Category X and contraindicated in pregnancy and in women who may become pregnant. It is unknown if this medication is excreted in breast milk.
Bactrim Pregnancy And Lactation Text: This medication is Pregnancy Category D and is known to cause fetal risk.  It is also excreted in breast milk.
Arava Counseling:  Patient counseled regarding adverse effects of Arava including but not limited to nausea, vomiting, abnormalities in liver function tests. Patients may develop mouth sores, rash, diarrhea, and abnormalities in blood counts. The patient understands that monitoring is required including LFTs and blood counts.  There is a rare possibility of scarring of the liver and lung problems that can occur when taking methotrexate. Persistent nausea, loss of appetite, pale stools, dark urine, cough, and shortness of breath should be reported immediately. Patient advised to discontinue Arava treatment and consult with a physician prior to attempting conception. The patient will have to undergo a treatment to eliminate Arava from the body prior to conception.
Imiquimod Counseling:  I discussed with the patient the risks of imiquimod including but not limited to erythema, scaling, itching, weeping, crusting, and pain.  Patient understands that the inflammatory response to imiquimod is variable from person to person and was educated regarded proper titration schedule.  If flu-like symptoms develop, patient knows to discontinue the medication and contact us.
Glycopyrrolate Counseling:  I discussed with the patient the risks of glycopyrrolate including but not limited to skin rash, drowsiness, dry mouth, difficulty urinating, and blurred vision.
Topical Ketoconazole Counseling: Patient counseled that this medication may cause skin irritation or allergic reactions.  In the event of skin irritation, the patient was advised to reduce the amount of the drug applied or use it less frequently.   The patient verbalized understanding of the proper use and possible adverse effects of ketoconazole.  All of the patient's questions and concerns were addressed.
Include Pregnancy/Lactation Warning?: No
Valtrex Pregnancy And Lactation Text: this medication is Pregnancy Category B and is considered safe during pregnancy. This medication is not directly found in breast milk but it's metabolite acyclovir is present.
Azathioprine Counseling:  I discussed with the patient the risks of azathioprine including but not limited to myelosuppression, immunosuppression, hepatotoxicity, lymphoma, and infections.  The patient understands that monitoring is required including baseline LFTs, Creatinine, possible TPMP genotyping and weekly CBCs for the first month and then every 2 weeks thereafter.  The patient verbalized understanding of the proper use and possible adverse effects of azathioprine.  All of the patient's questions and concerns were addressed.
VTAMA Counseling: I discussed with the patient that VTAMA is not for use in the eyes, mouth or mouth. They should call the office if they develop any signs of allergic reactions to VTAMA. The patient verbalized understanding of the proper use and possible adverse effects of VTAMA.  All of the patient's questions and concerns were addressed.
Tetracycline Counseling: Patient counseled regarding possible photosensitivity and increased risk for sunburn.  Patient instructed to avoid sunlight, if possible.  When exposed to sunlight, patients should wear protective clothing, sunglasses, and sunscreen.  The patient was instructed to call the office immediately if the following severe adverse effects occur:  hearing changes, easy bruising/bleeding, severe headache, or vision changes.  The patient verbalized understanding of the proper use and possible adverse effects of tetracycline.  All of the patient's questions and concerns were addressed. Patient understands to avoid pregnancy while on therapy due to potential birth defects.
Cimzia Counseling:  I discussed with the patient the risks of Cimzia including but not limited to immunosuppression, allergic reactions and infections.  The patient understands that monitoring is required including a PPD at baseline and must alert us or the primary physician if symptoms of infection or other concerning signs are noted.
Bexarotene Pregnancy And Lactation Text: This medication is Pregnancy Category X and should not be given to women who are pregnant or may become pregnant. This medication should not be used if you are breast feeding.
Dutasteride Female Counseling: Dutasteride Counseling:  I discussed with the patient the risks of use of dutasteride including but not limited to decreased libido and sexual dysfunction. Explained the teratogenic nature of the medication and stressed the importance of not getting pregnant during treatment. All of the patient's questions and concerns were addressed.
Ketoconazole Pregnancy And Lactation Text: This medication is Pregnancy Category C and it isn't know if it is safe during pregnancy. It is also excreted in breast milk and breast feeding isn't recommended.
Nsaids Counseling: NSAID Counseling: I discussed with the patient that NSAIDs should be taken with food. Prolonged use of NSAIDs can result in the development of stomach ulcers.  Patient advised to stop taking NSAIDs if abdominal pain occurs.  The patient verbalized understanding of the proper use and possible adverse effects of NSAIDs.  All of the patient's questions and concerns were addressed.
Azelaic Acid Counseling: Patient counseled that medicine may cause skin irritation and to avoid applying near the eyes.  In the event of skin irritation, the patient was advised to reduce the amount of the drug applied or use it less frequently.   The patient verbalized understanding of the proper use and possible adverse effects of azelaic acid.  All of the patient's questions and concerns were addressed.
Topical Ketoconazole Pregnancy And Lactation Text: This medication is Pregnancy Category B and is considered safe during pregnancy. It is unknown if it is excreted in breast milk.
Cephalexin Counseling: I counseled the patient regarding use of cephalexin as an antibiotic for prophylactic and/or therapeutic purposes. Cephalexin (commonly prescribed under brand name Keflex) is a cephalosporin antibiotic which is active against numerous classes of bacteria, including most skin bacteria. Side effects may include nausea, diarrhea, gastrointestinal upset, rash, hives, yeast infections, and in rare cases, hepatitis, kidney disease, seizures, fever, confusion, neurologic symptoms, and others. Patients with severe allergies to penicillin medications are cautioned that there is about a 10% incidence of cross-reactivity with cephalosporins. When possible, patients with penicillin allergies should use alternatives to cephalosporins for antibiotic therapy.
Albendazole Counseling:  I discussed with the patient the risks of albendazole including but not limited to cytopenia, kidney damage, nausea/vomiting and severe allergy.  The patient understands that this medication is being used in an off-label manner.
Xolair Pregnancy And Lactation Text: This medication is Pregnancy Category B and is considered safe during pregnancy. This medication is excreted in breast milk.
Cimzia Pregnancy And Lactation Text: This medication crosses the placenta but can be considered safe in certain situations. Cimzia may be excreted in breast milk.
Metronidazole Pregnancy And Lactation Text: This medication is Pregnancy Category B and considered safe during pregnancy.  It is also excreted in breast milk.
Winlevi Counseling:  I discussed with the patient the risks of topical clascoterone including but not limited to erythema, scaling, itching, and stinging. Patient voiced their understanding.
Arava Pregnancy And Lactation Text: This medication is Pregnancy Category X and is absolutely contraindicated during pregnancy. It is unknown if it is excreted in breast milk.
Hyrimoz Counseling:  I discussed with the patient the risks of adalimumab including but not limited to myelosuppression, immunosuppression, autoimmune hepatitis, demyelinating diseases, lymphoma, and serious infections.  The patient understands that monitoring is required including a PPD at baseline and must alert us or the primary physician if symptoms of infection or other concerning signs are noted.
Prednisone Counseling:  I discussed with the patient the risks of prolonged use of prednisone including but not limited to weight gain, insomnia, osteoporosis, mood changes, diabetes, susceptibility to infection, glaucoma and high blood pressure.  In cases where prednisone use is prolonged, patients should be monitored with blood pressure checks, serum glucose levels and an eye exam.  Additionally, the patient may need to be placed on GI prophylaxis, PCP prophylaxis, and calcium and vitamin D supplementation and/or a bisphosphonate.  The patient verbalized understanding of the proper use and the possible adverse effects of prednisone.  All of the patient's questions and concerns were addressed.
Skyrizi Counseling: I discussed with the patient the risks of risankizumab-rzaa including but not limited to immunosuppression, and serious infections.  The patient understands that monitoring is required including a PPD at baseline and must alert us or the primary physician if symptoms of infection or other concerning signs are noted.
Propranolol Counseling:  I discussed with the patient the risks of propranolol including but not limited to low heart rate, low blood pressure, low blood sugar, restlessness and increased cold sensitivity. They should call the office if they experience any of these side effects.
Picato Counseling:  I discussed with the patient the risks of Picato including but not limited to erythema, scaling, itching, weeping, crusting, and pain.
Winlevi Pregnancy And Lactation Text: This medication is considered safe during pregnancy and breastfeeding.
Vtama Pregnancy And Lactation Text: It is unknown if this medication can cause problems during pregnancy and breastfeeding.
Minocycline Counseling: Patient advised regarding possible photosensitivity and discoloration of the teeth, skin, lips, tongue and gums.  Patient instructed to avoid sunlight, if possible.  When exposed to sunlight, patients should wear protective clothing, sunglasses, and sunscreen.  The patient was instructed to call the office immediately if the following severe adverse effects occur:  hearing changes, easy bruising/bleeding, severe headache, or vision changes.  The patient verbalized understanding of the proper use and possible adverse effects of minocycline.  All of the patient's questions and concerns were addressed.
Azathioprine Pregnancy And Lactation Text: This medication is Pregnancy Category D and isn't considered safe during pregnancy. It is unknown if this medication is excreted in breast milk.
Nsaids Pregnancy And Lactation Text: These medications are considered safe up to 30 weeks gestation. It is excreted in breast milk.
Dutasteride Pregnancy And Lactation Text: This medication is absolutely contraindicated in women, especially during pregnancy and breast feeding. Feminization of male fetuses is possible if taking while pregnant.
Rinvoq Pregnancy And Lactation Text: Based on animal studies, Rinvoq may cause embryo-fetal harm when administered to pregnant women.  The medication should not be used in pregnancy.  Breastfeeding is not recommended during treatment and for 6 days after the last dose.
Hyrimoz Pregnancy And Lactation Text: This medication is Pregnancy Category B and is considered safe during pregnancy. It is unknown if this medication is excreted in breast milk.
Tetracycline Pregnancy And Lactation Text: This medication is Pregnancy Category D and not consider safe during pregnancy. It is also excreted in breast milk.
Imiquimod Pregnancy And Lactation Text: This medication is Pregnancy Category C. It is unknown if this medication is excreted in breast milk.
Terbinafine Counseling: Patient counseling regarding adverse effects of terbinafine including but not limited to headache, diarrhea, rash, upset stomach, liver function test abnormalities, itching, taste/smell disturbance, nausea, abdominal pain, and flatulence.  There is a rare possibility of liver failure that can occur when taking terbinafine.  The patient understands that a baseline LFT and kidney function test may be required. The patient verbalized understanding of the proper use and possible adverse effects of terbinafine.  All of the patient's questions and concerns were addressed.
Glycopyrrolate Pregnancy And Lactation Text: This medication is Pregnancy Category B and is considered safe during pregnancy. It is unknown if it is excreted breast milk.
Soolantra Counseling: I discussed with the patients the risks of topial Soolantra. This is a medicine which decreases the number of mites and inflammation in the skin. You experience burning, stinging, eye irritation or allergic reactions.  Please call our office if you develop any problems from using this medication.
Drysol Counseling:  I discussed with the patient the risks of drysol/aluminum chloride including but not limited to skin rash, itching, irritation, burning.
Cosentyx Counseling:  I discussed with the patient the risks of Cosentyx including but not limited to worsening of Crohn's disease, immunosuppression, allergic reactions and infections.  The patient understands that monitoring is required including a PPD at baseline and must alert us or the primary physician if symptoms of infection or other concerning signs are noted.
Clofazimine Counseling:  I discussed with the patient the risks of clofazimine including but not limited to skin and eye pigmentation, liver damage, nausea/vomiting, gastrointestinal bleeding and allergy.
Klisyri Counseling:  I discussed with the patient the risks of Klisyri including but not limited to erythema, scaling, itching, weeping, crusting, and pain.
Propranolol Pregnancy And Lactation Text: This medication is Pregnancy Category C and it isn't known if it is safe during pregnancy. It is excreted in breast milk.
Cellcept Counseling:  I discussed with the patient the risks of mycophenolate mofetil including but not limited to infection/immunosuppression, GI upset, hypokalemia, hypercholesterolemia, bone marrow suppression, lymphoproliferative disorders, malignancy, GI ulceration/bleed/perforation, colitis, interstitial lung disease, kidney failure, progressive multifocal leukoencephalopathy, and birth defects.  The patient understands that monitoring is required including a baseline creatinine and regular CBC testing. In addition, patient must alert us immediately if symptoms of infection or other concerning signs are noted.
Finasteride Male Counseling: Finasteride Counseling:  I discussed with the patient the risks of use of finasteride including but not limited to decreased libido, decreased ejaculate volume, gynecomastia, and depression. Women should not handle medication.  All of the patient's questions and concerns were addressed.
Azelaic Acid Pregnancy And Lactation Text: This medication is considered safe during pregnancy and breast feeding.
Olanzapine Counseling- I discussed with the patient the common side effects of olanzapine including but are not limited to: lack of energy, dry mouth, increased appetite, sleepiness, tremor, constipation, dizziness, changes in behavior, or restlessness.  Explained that teenagers are more likely to experience headaches, abdominal pain, pain in the arms or legs, tiredness, and sleepiness.  Serious side effects include but are not limited: increased risk of death in elderly patients who are confused, have memory loss, or dementia-related psychosis; hyperglycemia; increased cholesterol and triglycerides; and weight gain.
Sotyktu Counseling:  I discussed the most common side effects of Sotyktu including: common cold, sore throat, sinus infections, cold sores, canker sores, folliculitis, and acne.  I also discussed more serious side effects of Sotyktu including but not limited to: serious allergic reactions; increased risk for infections such as TB; cancers such as lymphomas; rhabdomyolysis and elevated CPK; and elevated triglycerides and liver enzymes. 
Topical Metronidazole Counseling: Metronidazole is a topical antibiotic medication. You may experience burning, stinging, redness, or allergic reactions.  Please call our office if you develop any problems from using this medication.
Prednisone Pregnancy And Lactation Text: This medication is Pregnancy Category C and it isn't know if it is safe during pregnancy. This medication is excreted in breast milk.
Erivedge Counseling- I discussed with the patient the risks of Erivedge including but not limited to nausea, vomiting, diarrhea, constipation, weight loss, changes in the sense of taste, decreased appetite, muscle spasms, and hair loss.  The patient verbalized understanding of the proper use and possible adverse effects of Erivedge.  All of the patient's questions and concerns were addressed.
Cephalexin Pregnancy And Lactation Text: This medication is Pregnancy Category B and considered safe during pregnancy.  It is also excreted in breast milk but can be used safely for shorter doses.
Albendazole Pregnancy And Lactation Text: This medication is Pregnancy Category C and it isn't known if it is safe during pregnancy. It is also excreted in breast milk.
Skyrizi Pregnancy And Lactation Text: The risk during pregnancy and breastfeeding is uncertain with this medication.
Ilumya Counseling: I discussed with the patient the risks of tildrakizumab including but not limited to immunosuppression, malignancy, posterior leukoencephalopathy syndrome, and serious infections.  The patient understands that monitoring is required including a PPD at baseline and must alert us or the primary physician if symptoms of infection or other concerning signs are noted.
Fluconazole Counseling:  Patient counseled regarding adverse effects of fluconazole including but not limited to headache, diarrhea, nausea, upset stomach, liver function test abnormalities, taste disturbance, and stomach pain.  There is a rare possibility of liver failure that can occur when taking fluconazole.  The patient understands that monitoring of LFTs and kidney function test may be required, especially at baseline. The patient verbalized understanding of the proper use and possible adverse effects of fluconazole.  All of the patient's questions and concerns were addressed.
Hydroxychloroquine Counseling:  I discussed with the patient that a baseline ophthalmologic exam is needed at the start of therapy and every year thereafter while on therapy. A CBC may also be warranted for monitoring.  The side effects of this medication were discussed with the patient, including but not limited to agranulocytosis, aplastic anemia, seizures, rashes, retinopathy, and liver toxicity. Patient instructed to call the office should any adverse effect occur.  The patient verbalized understanding of the proper use and possible adverse effects of Plaquenil.  All the patient's questions and concerns were addressed.
Protopic Counseling: Patient may experience a mild burning sensation during topical application. Protopic is not approved in children less than 2 years of age. There have been case reports of hematologic and skin malignancies in patients using topical calcineurin inhibitors although causality is questionable.
Benzoyl Peroxide Counseling: Patient counseled that medicine may cause skin irritation and bleach clothing.  In the event of skin irritation, the patient was advised to reduce the amount of the drug applied or use it less frequently.   The patient verbalized understanding of the proper use and possible adverse effects of benzoyl peroxide.  All of the patient's questions and concerns were addressed.
Zoryve Counseling:  I discussed with the patient that Zoryve is not for use in the eyes, mouth or vagina. The most commonly reported side effects include diarrhea, headache, insomnia, application site pain, upper respiratory tract infections, and urinary tract infections.  All of the patient's questions and concerns were addressed.
Soolantra Pregnancy And Lactation Text: This medication is Pregnancy Category C. This medication is considered safe during breast feeding.
Clofazimine Pregnancy And Lactation Text: This medication is Pregnancy Category C and isn't considered safe during pregnancy. It is excreted in breast milk.
Terbinafine Pregnancy And Lactation Text: This medication is Pregnancy Category B and is considered safe during pregnancy. It is also excreted in breast milk and breast feeding isn't recommended.
Finasteride Female Counseling: Finasteride Counseling:  I discussed with the patient the risks of use of finasteride including but not limited to decreased libido and sexual dysfunction. Explained the teratogenic nature of the medication and stressed the importance of not getting pregnant during treatment. All of the patient's questions and concerns were addressed.
Sotyktu Pregnancy And Lactation Text: There is insufficient data to evaluate whether or not Sotyktu is safe to use during pregnancy.   It is not known if Sotyktu passes into breast milk and whether or not it is safe to use when breastfeeding.  
Topical Retinoid counseling:  Patient advised to apply a pea-sized amount only at bedtime and wait 30 minutes after washing their face before applying.  If too drying, patient may add a non-comedogenic moisturizer. The patient verbalized understanding of the proper use and possible adverse effects of retinoids.  All of the patient's questions and concerns were addressed.
SSKI Counseling:  I discussed with the patient the risks of SSKI including but not limited to thyroid abnormalities, metallic taste, GI upset, fever, headache, acne, arthralgias, paraesthesias, lymphadenopathy, easy bleeding, arrhythmias, and allergic reaction.
Stelara Counseling:  I discussed with the patient the risks of ustekinumab including but not limited to immunosuppression, malignancy, posterior leukoencephalopathy syndrome, and serious infections.  The patient understands that monitoring is required including a PPD at baseline and must alert us or the primary physician if symptoms of infection or other concerning signs are noted.
Topical Metronidazole Pregnancy And Lactation Text: This medication is Pregnancy Category B and considered safe during pregnancy.  It is also considered safe to use while breastfeeding.
Clindamycin Counseling: I counseled the patient regarding use of clindamycin as an antibiotic for prophylactic and/or therapeutic purposes. Clindamycin is active against numerous classes of bacteria, including skin bacteria. Side effects may include nausea, diarrhea, gastrointestinal upset, rash, hives, yeast infections, and in rare cases, colitis.
Hydroxychloroquine Pregnancy And Lactation Text: This medication has been shown to cause fetal harm but it isn't assigned a Pregnancy Risk Category. There are small amounts excreted in breast milk.
Ivermectin Counseling:  Patient instructed to take medication on an empty stomach with a full glass of water.  Patient informed of potential adverse effects including but not limited to nausea, diarrhea, dizziness, itching, and swelling of the extremities or lymph nodes.  The patient verbalized understanding of the proper use and possible adverse effects of ivermectin.  All of the patient's questions and concerns were addressed.
Klisyri Pregnancy And Lactation Text: It is unknown if this medication can harm a developing fetus or if it is excreted in breast milk.
Olanzapine Pregnancy And Lactation Text: This medication is pregnancy category C.   There are no adequate and well controlled trials with olanzapine in pregnant females.  Olanzapine should be used during pregnancy only if the potential benefit justifies the potential risk to the fetus.   In a study in lactating healthy women, olanzapine was excreted in breast milk.  It is recommended that women taking olanzapine should not breast feed.
Cimetidine Counseling:  I discussed with the patient the risks of Cimetidine including but not limited to gynecomastia, headache, diarrhea, nausea, drowsiness, arrhythmias, pancreatitis, skin rashes, psychosis, bone marrow suppression and kidney toxicity.
Isotretinoin Counseling: Patient should get monthly blood tests, not donate blood, not drive at night if vision affected, not share medication, and not undergo elective surgery for 6 months after tx completed. Side effects reviewed, pt to contact office should one occur.
Topical Steroids Counseling: I discussed with the patient that prolonged use of topical steroids can result in the increased appearance of superficial blood vessels (telangiectasias), lightening (hypopigmentation) and thinning of the skin (atrophy).  Patient understands to avoid using high potency steroids in skin folds, the groin or the face.  The patient verbalized understanding of the proper use and possible adverse effects of topical steroids.  All of the patient's questions and concerns were addressed.
Clindamycin Pregnancy And Lactation Text: This medication can be used in pregnancy if certain situations. Clindamycin is also present in breast milk.
Cibinqo Counseling: I discussed with the patient the risks of Cibinqo therapy including but not limited to common cold, nausea, headache, cold sores, increased blood CPK levels, dizziness, UTIs, fatigue, acne, and vomitting. Live vaccines should be avoided.  This medication has been linked to serious infections; higher rate of mortality; malignancy and lymphoproliferative disorders; major adverse cardiovascular events; thrombosis; thrombocytopenia and lymphopenia; lipid elevations; and retinal detachment.
Dupixent Counseling: I discussed with the patient the risks of dupilumab including but not limited to eye infection and irritation, cold sores, injection site reactions, worsening of asthma, allergic reactions and increased risk of parasitic infection.  Live vaccines should be avoided while taking dupilumab. Dupilumab will also interact with certain medications such as warfarin and cyclosporine. The patient understands that monitoring is required and they must alert us or the primary physician if symptoms of infection or other concerning signs are noted.
Quinolones Counseling:  I discussed with the patient the risks of fluoroquinolones including but not limited to GI upset, allergic reaction, drug rash, diarrhea, dizziness, photosensitivity, yeast infections, liver function test abnormalities, tendonitis/tendon rupture.
Fluconazole Pregnancy And Lactation Text: This medication is Pregnancy Category C and it isn't know if it is safe during pregnancy. It is also excreted in breast milk.
Elidel Counseling: Patient may experience a mild burning sensation during topical application. Elidel is not approved in children less than 2 years of age. There have been case reports of hematologic and skin malignancies in patients using topical calcineurin inhibitors although causality is questionable.
Sski Pregnancy And Lactation Text: This medication is Pregnancy Category D and isn't considered safe during pregnancy. It is excreted in breast milk.
Colchicine Counseling:  Patient counseled regarding adverse effects including but not limited to stomach upset (nausea, vomiting, stomach pain, or diarrhea).  Patient instructed to limit alcohol consumption while taking this medication.  Colchicine may reduce blood counts especially with prolonged use.  The patient understands that monitoring of kidney function and blood counts may be required, especially at baseline. The patient verbalized understanding of the proper use and possible adverse effects of colchicine.  All of the patient's questions and concerns were addressed.
Azithromycin Counseling:  I discussed with the patient the risks of azithromycin including but not limited to GI upset, allergic reaction, drug rash, diarrhea, and yeast infections.
Protopic Pregnancy And Lactation Text: This medication is Pregnancy Category C. It is unknown if this medication is excreted in breast milk when applied topically.
Benzoyl Peroxide Pregnancy And Lactation Text: This medication is Pregnancy Category C. It is unknown if benzoyl peroxide is excreted in breast milk.
Zyclara Counseling:  I discussed with the patient the risks of imiquimod including but not limited to erythema, scaling, itching, weeping, crusting, and pain.  Patient understands that the inflammatory response to imiquimod is variable from person to person and was educated regarded proper titration schedule.  If flu-like symptoms develop, patient knows to discontinue the medication and contact us.
Finasteride Pregnancy And Lactation Text: This medication is absolutely contraindicated during pregnancy. It is unknown if it is excreted in breast milk.
Libtayo Counseling- I discussed with the patient the risks of Libtayo including but not limited to nausea, vomiting, diarrhea, and bone or muscle pain.  The patient verbalized understanding of the proper use and possible adverse effects of Libtayo.  All of the patient's questions and concerns were addressed.
Xeljanz Counseling: I discussed with the patient the risks of Xeljanz therapy including increased risk of infection, liver issues, headache, diarrhea, or cold symptoms. Live vaccines should be avoided. They were instructed to call if they have any problems.
Infliximab Counseling:  I discussed with the patient the risks of infliximab including but not limited to myelosuppression, immunosuppression, autoimmune hepatitis, demyelinating diseases, lymphoma, and serious infections.  The patient understands that monitoring is required including a PPD at baseline and must alert us or the primary physician if symptoms of infection or other concerning signs are noted.
Minoxidil Counseling: Minoxidil is a topical medication which can increase blood flow where it is applied. It is uncertain how this medication increases hair growth. Side effects are uncommon and include stinging and allergic reactions.
Cibinqo Pregnancy And Lactation Text: It is unknown if this medication will adversely affect pregnancy or breast feeding.  You should not take this medication if you are currently pregnant or planning a pregnancy or while breastfeeding.
Cyclophosphamide Counseling:  I discussed with the patient the risks of cyclophosphamide including but not limited to hair loss, hormonal abnormalities, decreased fertility, abdominal pain, diarrhea, nausea and vomiting, bone marrow suppression and infection. The patient understands that monitoring is required while taking this medication.
Oral Minoxidil Counseling- I discussed with the patient the risks of oral minoxidil including but not limited to shortness of breath, swelling of the feet or ankles, dizziness, lightheadedness, unwanted hair growth and allergic reaction.  The patient verbalized understanding of the proper use and possible adverse effects of oral minoxidil.  All of the patient's questions and concerns were addressed.
Low Dose Naltrexone Counseling- I discussed with the patient the potential risks and side effects of low dose naltrexone including but not limited to: more vivid dreams, headaches, nausea, vomiting, abdominal pain, fatigue, dizziness, and anxiety.
Rituxan Counseling:  I discussed with the patient the risks of Rituxan infusions. Side effects can include infusion reactions, severe drug rashes including mucocutaneous reactions, reactivation of latent hepatitis and other infections and rarely progressive multifocal leukoencephalopathy.  All of the patient's questions and concerns were addressed.
Dupixent Pregnancy And Lactation Text: This medication likely crosses the placenta but the risk for the fetus is uncertain. This medication is excreted in breast milk.
Topical Steroids Applications Pregnancy And Lactation Text: Most topical steroids are considered safe to use during pregnancy and lactation.  Any topical steroid applied to the breast or nipple should be washed off before breastfeeding.
Minoxidil Pregnancy And Lactation Text: This medication has not been assigned a Pregnancy Risk Category but animal studies failed to show danger with the topical medication. It is unknown if the medication is excreted in breast milk.
Isotretinoin Pregnancy And Lactation Text: This medication is Pregnancy Category X and is considered extremely dangerous during pregnancy. It is unknown if it is excreted in breast milk.
Qbrexza Counseling:  I discussed with the patient the risks of Qbrexza including but not limited to headache, mydriasis, blurred vision, dry eyes, nasal dryness, dry mouth, dry throat, dry skin, urinary hesitation, and constipation.  Local skin reactions including erythema, burning, stinging, and itching can also occur.
Carac Counseling:  I discussed with the patient the risks of Carac including but not limited to erythema, scaling, itching, weeping, crusting, and pain.
Birth Control Pills Counseling: Birth Control Pill Counseling: I discussed with the patient the potential side effects of OCPs including but not limited to increased risk of stroke, heart attack, thrombophlebitis, deep venous thrombosis, hepatic adenomas, breast changes, GI upset, headaches, and depression.  The patient verbalized understanding of the proper use and possible adverse effects of OCPs. All of the patient's questions and concerns were addressed.
Griseofulvin Counseling:  I discussed with the patient the risks of griseofulvin including but not limited to photosensitivity, cytopenia, liver damage, nausea/vomiting and severe allergy.  The patient understands that this medication is best absorbed when taken with a fatty meal (e.g., ice cream or french fries).
Libtayo Pregnancy And Lactation Text: This medication is contraindicated in pregnancy and when breast feeding.
Xelmalkaz Pregnancy And Lactation Text: This medication is Pregnancy Category D and is not considered safe during pregnancy.  The risk during breast feeding is also uncertain.
Taltz Counseling: I discussed with the patient the risks of ixekizumab including but not limited to immunosuppression, serious infections, worsening of inflammatory bowel disease and drug reactions.  The patient understands that monitoring is required including a PPD at baseline and must alert us or the primary physician if symptoms of infection or other concerning signs are noted.
Doxycycline Counseling:  Patient counseled regarding possible photosensitivity and increased risk for sunburn.  Patient instructed to avoid sunlight, if possible.  When exposed to sunlight, patients should wear protective clothing, sunglasses, and sunscreen.  The patient was instructed to call the office immediately if the following severe adverse effects occur:  hearing changes, easy bruising/bleeding, severe headache, or vision changes.  The patient verbalized understanding of the proper use and possible adverse effects of doxycycline.  All of the patient's questions and concerns were addressed.
Thalidomide Counseling: I discussed with the patient the risks of thalidomide including but not limited to birth defects, anxiety, weakness, chest pain, dizziness, cough and severe allergy.
Litfulo Counseling: I discussed with the patient the risks of Litfulo therapy including but not limited to upper respiratory tract infections, shingles, cold sores, and nausea. Live vaccines should be avoided.  This medication has been linked to serious infections; higher rate of mortality; malignancy and lymphoproliferative disorders; major adverse cardiovascular events; thrombosis; gastrointestinal perforations; neutropenia; lymphopenia; anemia; liver enzyme elevations; and lipid elevations.
High Dose Vitamin A Counseling: Side effects reviewed, pt to contact office should one occur.
Qbrexza Pregnancy And Lactation Text: There is no available data on Qbrexza use in pregnant women.  There is no available data on Qbrexza use in lactation.
Rifampin Counseling: I discussed with the patient the risks of rifampin including but not limited to liver damage, kidney damage, red-orange body fluids, nausea/vomiting and severe allergy.
Cyclophosphamide Pregnancy And Lactation Text: This medication is Pregnancy Category D and it isn't considered safe during pregnancy. This medication is excreted in breast milk.
Oral Minoxidil Pregnancy And Lactation Text: This medication should only be used when clearly needed if you are pregnant, attempting to become pregnant or breast feeding.
Griseofulvin Pregnancy And Lactation Text: This medication is Pregnancy Category X and is known to cause serious birth defects. It is unknown if this medication is excreted in breast milk but breast feeding should be avoided.
Rituxan Pregnancy And Lactation Text: This medication is Pregnancy Category C and it isn't know if it is safe during pregnancy. It is unknown if this medication is excreted in breast milk but similar antibodies are known to be excreted.
Doxepin Counseling:  Patient advised that the medication is sedating and not to drive a car after taking this medication. Patient informed of potential adverse effects including but not limited to dry mouth, urinary retention, and blurry vision.  The patient verbalized understanding of the proper use and possible adverse effects of doxepin.  All of the patient's questions and concerns were addressed.
Tazorac Counseling:  Patient advised that medication is irritating and drying.  Patient may need to apply sparingly and wash off after an hour before eventually leaving it on overnight.  The patient verbalized understanding of the proper use and possible adverse effects of tazorac.  All of the patient's questions and concerns were addressed.
Dapsone Counseling: I discussed with the patient the risks of dapsone including but not limited to hemolytic anemia, agranulocytosis, rashes, methemoglobinemia, kidney failure, peripheral neuropathy, headaches, GI upset, and liver toxicity.  Patients who start dapsone require monitoring including baseline LFTs and weekly CBCs for the first month, then every month thereafter.  The patient verbalized understanding of the proper use and possible adverse effects of dapsone.  All of the patient's questions and concerns were addressed.
Adbry Counseling: I discussed with the patient the risks of tralokinumab including but not limited to eye infection and irritation, cold sores, injection site reactions, worsening of asthma, allergic reactions and increased risk of parasitic infection.  Live vaccines should be avoided while taking tralokinumab. The patient understands that monitoring is required and they must alert us or the primary physician if symptoms of infection or other concerning signs are noted.
Eucrisa Counseling: Patient may experience a mild burning sensation during topical application. Eucrisa is not approved in children less than 2 years of age.
Odomzo Counseling- I discussed with the patient the risks of Odomzo including but not limited to nausea, vomiting, diarrhea, constipation, weight loss, changes in the sense of taste, decreased appetite, muscle spasms, and hair loss.  The patient verbalized understanding of the proper use and possible adverse effects of Odomzo.  All of the patient's questions and concerns were addressed.
Adbry Pregnancy And Lactation Text: It is unknown if this medication will adversely affect pregnancy or breast feeding.
Siliq Counseling:  I discussed with the patient the risks of Siliq including but not limited to new or worsening depression, suicidal thoughts and behavior, immunosuppression, malignancy, posterior leukoencephalopathy syndrome, and serious infections.  The patient understands that monitoring is required including a PPD at baseline and must alert us or the primary physician if symptoms of infection or other concerning signs are noted. There is also a special program designed to monitor depression which is required with Siliq.
Cyclosporine Counseling:  I discussed with the patient the risks of cyclosporine including but not limited to hypertension, gingival hyperplasia,myelosuppression, immunosuppression, liver damage, kidney damage, neurotoxicity, lymphoma, and serious infections. The patient understands that monitoring is required including baseline blood pressure, CBC, CMP, lipid panel and uric acid, and then 1-2 times monthly CMP and blood pressure.
Doxycycline Pregnancy And Lactation Text: This medication is Pregnancy Category D and not consider safe during pregnancy. It is also excreted in breast milk but is considered safe for shorter treatment courses.
Otezla Counseling: The side effects of Otezla were discussed with the patient, including but not limited to worsening or new depression, weight loss, diarrhea, nausea, upper respiratory tract infection, and headache. Patient instructed to call the office should any adverse effect occur.  The patient verbalized understanding of the proper use and possible adverse effects of Otezla.  All the patient's questions and concerns were addressed.
Birth Control Pills Pregnancy And Lactation Text: This medication should be avoided if pregnant and for the first 30 days post-partum.
Enbrel Counseling:  I discussed with the patient the risks of etanercept including but not limited to myelosuppression, immunosuppression, autoimmune hepatitis, demyelinating diseases, lymphoma, and infections.  The patient understands that monitoring is required including a PPD at baseline and must alert us or the primary physician if symptoms of infection or other concerning signs are noted.
Topical Sulfur Applications Counseling: Topical Sulfur Counseling: Patient counseled that this medication may cause skin irritation or allergic reactions.  In the event of skin irritation, the patient was advised to reduce the amount of the drug applied or use it less frequently.   The patient verbalized understanding of the proper use and possible adverse effects of topical sulfur application.  All of the patient's questions and concerns were addressed.
Mirvaso Counseling: Mirvaso is a topical medication which can decrease superficial blood flow where applied. Side effects are uncommon and include stinging, redness and allergic reactions.
Itraconazole Counseling:  I discussed with the patient the risks of itraconazole including but not limited to liver damage, nausea/vomiting, neuropathy, and severe allergy.  The patient understands that this medication is best absorbed when taken with acidic beverages such as non-diet cola or ginger ale.  The patient understands that monitoring is required including baseline LFTs and repeat LFTs at intervals.  The patient understands that they are to contact us or the primary physician if concerning signs are noted.
Opioid Counseling: I discussed with the patient the potential side effects of opioids including but not limited to addiction, altered mental status, and depression. I stressed avoiding alcohol, benzodiazepines, muscle relaxants and sleep aids unless specifically okayed by a physician. The patient verbalized understanding of the proper use and possible adverse effects of opioids. All of the patient's questions and concerns were addressed. They were instructed to flush the remaining pills down the toilet if they did not need them for pain.
Doxepin Pregnancy And Lactation Text: This medication is Pregnancy Category C and it isn't known if it is safe during pregnancy. It is also excreted in breast milk and breast feeding isn't recommended.
Acitretin Counseling:  I discussed with the patient the risks of acitretin including but not limited to hair loss, dry lips/skin/eyes, liver damage, hyperlipidemia, depression/suicidal ideation, photosensitivity.  Serious rare side effects can include but are not limited to pancreatitis, pseudotumor cerebri, bony changes, clot formation/stroke/heart attack.  Patient understands that alcohol is contraindicated since it can result in liver toxicity and significantly prolong the elimination of the drug by many years.
Calcipotriene Counseling:  I discussed with the patient the risks of calcipotriene including but not limited to erythema, scaling, itching, and irritation.
Litfulo Pregnancy And Lactation Text: Based on animal studies, Lifulo may cause embryo-fetal harm when administered to pregnant women.  The medication should not be used in pregnancy.  Breastfeeding is not recommended during treatment.
Tazorac Pregnancy And Lactation Text: This medication is not safe during pregnancy. It is unknown if this medication is excreted in breast milk.
Dapsone Pregnancy And Lactation Text: This medication is Pregnancy Category C and is not considered safe during pregnancy or breast feeding.
Tranexamic Acid Counseling:  Patient advised of the small risk of bleeding problems with tranexamic acid. They were also instructed to call if they developed any nausea, vomiting or diarrhea. All of the patient's questions and concerns were addressed.
Tremfya Counseling: I discussed with the patient the risks of guselkumab including but not limited to immunosuppression, serious infections, worsening of inflammatory bowel disease and drug reactions.  The patient understands that monitoring is required including a PPD at baseline and must alert us or the primary physician if symptoms of infection or other concerning signs are noted.
High Dose Vitamin A Pregnancy And Lactation Text: High dose vitamin A therapy is contraindicated during pregnancy and breast feeding.
Rifampin Pregnancy And Lactation Text: This medication is Pregnancy Category C and it isn't know if it is safe during pregnancy. It is also excreted in breast milk and should not be used if you are breast feeding.
Low Dose Naltrexone Pregnancy And Lactation Text: Naltrexone is pregnancy category C.  There have been no adequate and well-controlled studies in pregnant women.  It should be used in pregnancy only if the potential benefit justifies the potential risk to the fetus.   Limited data indicates that naltrexone is minimally excreted into breastmilk.
Rhofade Counseling: Rhofade is a topical medication which can decrease superficial blood flow where applied. Side effects are uncommon and include stinging, redness and allergic reactions.
Bimzelx Counseling:  I discussed with the patient the risks of Bimzelx including but not limited to depression, immunosuppression, allergic reactions and infections.  The patient understands that monitoring is required including a PPD at baseline and must alert us or the primary physician if symptoms of infection or other concerning signs are noted.
Spironolactone Counseling: Patient advised regarding risks of diarrhea, abdominal pain, hyperkalemia, birth defects (for female patients), liver toxicity and renal toxicity. The patient may need blood work to monitor liver and kidney function and potassium levels while on therapy. The patient verbalized understanding of the proper use and possible adverse effects of spironolactone.  All of the patient's questions and concerns were addressed.
Azithromycin Pregnancy And Lactation Text: This medication is considered safe during pregnancy and is also secreted in breast milk.
Calcipotriene Pregnancy And Lactation Text: The use of this medication during pregnancy or lactation is not recommended as there is insufficient data.
Topical Clindamycin Counseling: Patient counseled that this medication may cause skin irritation or allergic reactions.  In the event of skin irritation, the patient was advised to reduce the amount of the drug applied or use it less frequently.   The patient verbalized understanding of the proper use and possible adverse effects of clindamycin.  All of the patient's questions and concerns were addressed.
Hydroquinone Counseling:  Patient advised that medication may result in skin irritation, lightening (hypopigmentation), dryness, and burning.  In the event of skin irritation, the patient was advised to reduce the amount of the drug applied or use it less frequently.  Rarely, spots that are treated with hydroquinone can become darker (pseudoochronosis).  Should this occur, patient instructed to stop medication and call the office. The patient verbalized understanding of the proper use and possible adverse effects of hydroquinone.  All of the patient's questions and concerns were addressed.
Mirvaso Pregnancy And Lactation Text: This medication has not been assigned a Pregnancy Risk Category. It is unknown if the medication is excreted in breast milk.
Erythromycin Counseling:  I discussed with the patient the risks of erythromycin including but not limited to GI upset, allergic reaction, drug rash, diarrhea, increase in liver enzymes, and yeast infections.
Topical Sulfur Applications Pregnancy And Lactation Text: This medication is Pregnancy Category C and has an unknown safety profile during pregnancy. It is unknown if this topical medication is excreted in breast milk.
Olumiant Counseling: I discussed with the patient the risks of Olumiant therapy including but not limited to upper respiratory tract infections, shingles, cold sores, and nausea. Live vaccines should be avoided.  This medication has been linked to serious infections; higher rate of mortality; malignancy and lymphoproliferative disorders; major adverse cardiovascular events; thrombosis; gastrointestinal perforations; neutropenia; lymphopenia; anemia; liver enzyme elevations; and lipid elevations.
Otezla Pregnancy And Lactation Text: This medication is Pregnancy Category C and it isn't known if it is safe during pregnancy. It is unknown if it is excreted in breast milk.
Cantharidin Counseling:  I discussed with the patient the risks of Cantharidin including but not limited to pain, redness, burning, itching, and blistering.
Tranexamic Acid Pregnancy And Lactation Text: It is unknown if this medication is safe during pregnancy or breast feeding.
Wartpeel Counseling:  I discussed with the patient the risks of Wartpeel including but not limited to erythema, scaling, itching, weeping, crusting, and pain.
Acitretin Pregnancy And Lactation Text: This medication is Pregnancy Category X and should not be given to women who are pregnant or may become pregnant in the future. This medication is excreted in breast milk.
Erythromycin Pregnancy And Lactation Text: This medication is Pregnancy Category B and is considered safe during pregnancy. It is also excreted in breast milk.
Opioid Pregnancy And Lactation Text: These medications can lead to premature delivery and should be avoided during pregnancy. These medications are also present in breast milk in small amounts.
Opzelura Counseling:  I discussed with the patient the risks of Opzelura including but not limited to nasopharngitis, bronchitis, ear infection, eosinophila, hives, diarrhea, folliculitis, tonsillitis, and rhinorrhea.  Taken orally, this medication has been linked to serious infections; higher rate of mortality; malignancy and lymphoproliferative disorders; major adverse cardiovascular events; thrombosis; thrombocytopenia, anemia, and neutropenia; and lipid elevations.
Niacinamide Counseling: I recommended taking niacin or niacinamide, also know as vitamin B3, twice daily. Recent evidence suggests that taking vitamin B3 (500 mg twice daily) can reduce the risk of actinic keratoses and non-melanoma skin cancers. Side effects of vitamin B3 include flushing and headache.
Humira Counseling:  I discussed with the patient the risks of adalimumab including but not limited to myelosuppression, immunosuppression, autoimmune hepatitis, demyelinating diseases, lymphoma, and serious infections.  The patient understands that monitoring is required including a PPD at baseline and must alert us or the primary physician if symptoms of infection or other concerning signs are noted.
Sarecycline Counseling: Patient advised regarding possible photosensitivity and discoloration of the teeth, skin, lips, tongue and gums.  Patient instructed to avoid sunlight, if possible.  When exposed to sunlight, patients should wear protective clothing, sunglasses, and sunscreen.  The patient was instructed to call the office immediately if the following severe adverse effects occur:  hearing changes, easy bruising/bleeding, severe headache, or vision changes.  The patient verbalized understanding of the proper use and possible adverse effects of sarecycline.  All of the patient's questions and concerns were addressed.
Hydroxyzine Counseling: Patient advised that the medication is sedating and not to drive a car after taking this medication.  Patient informed of potential adverse effects including but not limited to dry mouth, urinary retention, and blurry vision.  The patient verbalized understanding of the proper use and possible adverse effects of hydroxyzine.  All of the patient's questions and concerns were addressed.
Gabapentin Counseling: I discussed with the patient the risks of gabapentin including but not limited to dizziness, somnolence, fatigue and ataxia.
Bactrim Counseling:  I discussed with the patient the risks of sulfa antibiotics including but not limited to GI upset, allergic reaction, drug rash, diarrhea, dizziness, photosensitivity, and yeast infections.  Rarely, more serious reactions can occur including but not limited to aplastic anemia, agranulocytosis, methemoglobinemia, blood dyscrasias, liver or kidney failure, lung infiltrates or desquamative/blistering drug rashes.
Olumiant Pregnancy And Lactation Text: Based on animal studies, Olumiant may cause embryo-fetal harm when administered to pregnant women.  The medication should not be used in pregnancy.  Breastfeeding is not recommended during treatment.
Methotrexate Counseling:  Patient counseled regarding adverse effects of methotrexate including but not limited to nausea, vomiting, abnormalities in liver function tests. Patients may develop mouth sores, rash, diarrhea, and abnormalities in blood counts. The patient understands that monitoring is required including LFT's and blood counts.  There is a rare possibility of scarring of the liver and lung problems that can occur when taking methotrexate. Persistent nausea, loss of appetite, pale stools, dark urine, cough, and shortness of breath should be reported immediately. Patient advised to discontinue methotrexate treatment at least three months before attempting to become pregnant.  I discussed the need for folate supplements while taking methotrexate.  These supplements can decrease side effects during methotrexate treatment. The patient verbalized understanding of the proper use and possible adverse effects of methotrexate.  All of the patient's questions and concerns were addressed.
Bimzelx Pregnancy And Lactation Text: This medication crosses the placenta and the safety is uncertain during pregnancy. It is unknown if this medication is present in breast milk.
Oxybutynin Counseling:  I discussed with the patient the risks of oxybutynin including but not limited to skin rash, drowsiness, dry mouth, difficulty urinating, and blurred vision.
Spironolactone Pregnancy And Lactation Text: This medication can cause feminization of the male fetus and should be avoided during pregnancy. The active metabolite is also found in breast milk.
Ketoconazole Counseling:   Patient counseled regarding improving absorption with orange juice.  Adverse effects include but are not limited to breast enlargement, headache, diarrhea, nausea, upset stomach, liver function test abnormalities, taste disturbance, and stomach pain.  There is a rare possibility of liver failure that can occur when taking ketoconazole. The patient understands that monitoring of LFTs may be required, especially at baseline. The patient verbalized understanding of the proper use and possible adverse effects of ketoconazole.  All of the patient's questions and concerns were addressed.
Simponi Counseling:  I discussed with the patient the risks of golimumab including but not limited to myelosuppression, immunosuppression, autoimmune hepatitis, demyelinating diseases, lymphoma, and serious infections.  The patient understands that monitoring is required including a PPD at baseline and must alert us or the primary physician if symptoms of infection or other concerning signs are noted.
Hydroxyzine Pregnancy And Lactation Text: This medication is not safe during pregnancy and should not be taken. It is also excreted in breast milk and breast feeding isn't recommended.
Bexarotene Counseling:  I discussed with the patient the risks of bexarotene including but not limited to hair loss, dry lips/skin/eyes, liver abnormalities, hyperlipidemia, pancreatitis, depression/suicidal ideation, photosensitivity, drug rash/allergic reactions, hypothyroidism, anemia, leukopenia, infection, cataracts, and teratogenicity.  Patient understands that they will need regular blood tests to check lipid profile, liver function tests, white blood cell count, thyroid function tests and pregnancy test if applicable.
Niacinamide Pregnancy And Lactation Text: These medications are considered safe during pregnancy.
Dutasteride Male Counseling: Dutasteride Counseling:  I discussed with the patient the risks of use of dutasteride including but not limited to decreased libido, decreased ejaculate volume, and gynecomastia. Women who can become pregnant should not handle medication.  All of the patient's questions and concerns were addressed.
Aklief counseling:  Patient advised to apply a pea-sized amount only at bedtime and wait 30 minutes after washing their face before applying.  If too drying, patient may add a non-comedogenic moisturizer.  The most commonly reported side effects including irritation, redness, scaling, dryness, stinging, burning, itching, and increased risk of sunburn.  The patient verbalized understanding of the proper use and possible adverse effects of retinoids.  All of the patient's questions and concerns were addressed.
Detail Level: Simple
Cantharidin Pregnancy And Lactation Text: This medication has not been proven safe during pregnancy. It is unknown if this medication is excreted in breast milk.
Opzelura Pregnancy And Lactation Text: There is insufficient data to evaluate drug-associated risk for major birth defects, miscarriage, or other adverse maternal or fetal outcomes.  There is a pregnancy registry that monitors pregnancy outcomes in pregnant persons exposed to the medication during pregnancy.  It is unknown if this medication is excreted in breast milk.  Do not breastfeed during treatment and for about 4 weeks after the last dose.
Aklief Pregnancy And Lactation Text: It is unknown if this medication is safe to use during pregnancy.  It is unknown if this medication is excreted in breast milk.  Breastfeeding women should use the topical cream on the smallest area of the skin for the shortest time needed while breastfeeding.  Do not apply to nipple and areola.
Valtrex Counseling: I discussed with the patient the risks of valacyclovir including but not limited to kidney damage, nausea, vomiting and severe allergy.  The patient understands that if the infection seems to be worsening or is not improving, they are to call.
Solaraze Counseling:  I discussed with the patient the risks of Solaraze including but not limited to erythema, scaling, itching, weeping, crusting, and pain.
Methotrexate Pregnancy And Lactation Text: This medication is Pregnancy Category X and is known to cause fetal harm. This medication is excreted in breast milk.
5-Fu Counseling: 5-Fluorouracil Counseling:  I discussed with the patient the risks of 5-fluorouracil including but not limited to erythema, scaling, itching, weeping, crusting, and pain.
Xolair Counseling:  Patient informed of potential adverse effects including but not limited to fever, muscle aches, rash and allergic reactions.  The patient verbalized understanding of the proper use and possible adverse effects of Xolair.  All of the patient's questions and concerns were addressed.
Metronidazole Counseling:  I discussed with the patient the risks of metronidazole including but not limited to seizures, nausea/vomiting, a metallic taste in the mouth, nausea/vomiting and severe allergy.

## 2024-06-13 LAB
GRAM STN SPEC: NORMAL
SIGNIFICANT IND 70042: NORMAL
SITE SITE: NORMAL
SOURCE SOURCE: NORMAL

## 2024-06-15 LAB
BACTERIA SPEC ANAEROBE CULT: NORMAL
BACTERIA WND AEROBE CULT: ABNORMAL
BACTERIA WND AEROBE CULT: ABNORMAL
GRAM STN SPEC: ABNORMAL
SIGNIFICANT IND 70042: ABNORMAL
SIGNIFICANT IND 70042: NORMAL
SITE SITE: ABNORMAL
SITE SITE: NORMAL
SOURCE SOURCE: ABNORMAL
SOURCE SOURCE: NORMAL

## 2024-06-19 ENCOUNTER — APPOINTMENT (OUTPATIENT)
Dept: PHYSICAL THERAPY | Facility: REHABILITATION | Age: 39
End: 2024-06-19
Attending: PHYSICAL MEDICINE & REHABILITATION
Payer: COMMERCIAL

## 2024-06-20 ENCOUNTER — NON-PROVIDER VISIT (OUTPATIENT)
Dept: NEUROLOGY | Facility: MEDICAL CENTER | Age: 39
End: 2024-06-20
Attending: SPECIALIST
Payer: COMMERCIAL

## 2024-06-20 DIAGNOSIS — R20.0 NUMBNESS: ICD-10-CM

## 2024-06-20 PROCEDURE — 95886 MUSC TEST DONE W/N TEST COMP: CPT | Mod: 26,RT | Performed by: SPECIALIST

## 2024-06-20 PROCEDURE — 95908 NRV CNDJ TST 3-4 STUDIES: CPT | Mod: 26 | Performed by: SPECIALIST

## 2024-06-20 PROCEDURE — 95908 NRV CNDJ TST 3-4 STUDIES: CPT | Performed by: SPECIALIST

## 2024-06-20 PROCEDURE — 95886 MUSC TEST DONE W/N TEST COMP: CPT | Performed by: SPECIALIST

## 2024-06-20 NOTE — PROCEDURES
NERVE CONDUCTION STUDIES AND ELECTROMYOGRAPHY REPORT        06/20/24      Referring provider: NATHANIEL Mazariegos      SUMMARY OF PATIENT'S CLINICAL HISTORY,PHYSICAL EXAM, AND RATIONALE FOR TESTING:    Ms. Mini Howe 38 y.o. presenting with numbness of the right second and third toes since the fall of last year.    Past Medical History is significant for : No past medical history on file.      The electrodiagnostic studies were performed to evaluate for possible peripheral neuropathy versus radiculopathy.      ELECTRODIAGNOSTIC EXAMINATION:  Nerve conduction studies (NCS) and electromyography (EMG) are utilized to evaluate direct or indirect damage to the peripheral nervous system. NCS are performed to measure the nerve(s) response(s) to electrostimulation across a given nerve segment. EMG evaluates the passive and active electrical activity of the muscle(s) in question.  Muscles are innervated by specific peripheral nerves and roots. Often times, several nerves the muscle to be examined in order to determine the presence or absence of the disease process. Furthermore, nerves and muscles may need to be tested in a [id number] comparison, as well as in additional extremities, as this may be crucial in characterizing the extent of the disease process, which may be diffuse or isolated and of varying degree of severity. The extent of the neurodiagnostic exam is justified as it may help arrive to a proper diagnosis, which ultimately may contribute to better management of the patient. Therefore, the nerves to muscles examined during the study were medically necessary.    Unless otherwise noted, temperature of the extremity(s) study was monitored before and during the examination and remained between 32 and 36 degrees C for the upper extremities, and between 30 and 36 degrees C for the lower extremities.      NERVE CONDUCTION STUDIES:  Sensory nerves:   -Right sural nerve was tested. The response was within  normal limits.    Motor nerves: .  -Right tibial nerve was examined. Recording electrodes placed at the Abductor Hallucis muscles. The response was within normal limits.  -Right deep Peroneal motor nerve was examined. Recording electrodes were placed at the Extensor Digitorum Brevis muscles.The response was within normal limits.     No temporal dispersion or conduction block observed in any of the motor nerves examined.          ELECTROMYOGRAPHY:  The study was performed the concentric needle electrode. Fibrillation and fasciculation activity is graded by convention from none (0) to continuous (4+).  Needle electrode examination was performed in the following muscles: Right vastus lateralis, tibialis anterior, gastrocnemius, extensor digitorum brevis, abductor hallucis.  The muscles tested demonstrated normal insertional activity, normal motor unit morphology and recruitment. There were no elements suggestive of active denervation.      Nerve Conduction Studies     Stim Site NR Onset (ms) Norm Onset (ms) O-P Amp (µV) Norm O-P Amp Site1 Site2 Delta-P (ms) Dist (cm) Vj (m/s) Norm Vj (m/s)   Right Sural Anti Sensory (Lat Mall)   Calf    3.1 <4.5 17.1 >5 Calf Lat Mall 3.8 14.0 *37 >40        Stim Site NR Onset (ms) Norm Onset (ms) O-P Amp (mV) Norm O-P Amp Site1 Site2 Delta-0 (ms) Dist (cm) Vj (m/s) Norm Vj (m/s)   Right Peroneal EDB Motor (Ext Dig Brev)   Ankle    4.2 <5.5 4.3 >3.0 B Fib Ankle 7.1 36.0 51 >40   B Fib    11.3  4.2  Poplt B Fib 1.1 10.0 91    Poplt    12.4  4.1          Right Tibial Motor (Abd Mclean Brev)   Ankle    3.9 <6 12.5 >8 Knee Ankle 8.9 40.0 45 >40   Knee    12.8  8.5                   Electromyography     Side Muscle Nerve Root Ins Act Fibs Psw Amp Dur Poly Recrt Int Pat Comment   Right VastusLat Femoral L2-4 Nml Nml Nml Nml Nml 0 Nml Nml    Right AntTibialis Dp Br Fibular L4-5 Nml Nml Nml Nml Nml 0 Nml Nml    Right Gastroc Tibial S1-2 Nml Nml Nml Nml Nml 0 Nml Nml    Right Ext Dig Brev Dp  Br Fibular L5, S1 Nml Nml Nml Nml Nml 0 Nml Nml    Right AbdHallucis MedPlantar S1-2 Nml Nml Nml Nml Nml 0 Nml Nml            DIAGNOSTIC INTERPRETATION:   Extensive electrodiagnostic studies were performed to the bilateral lower extremities.  The results are as follows:    1.  Normal EMG and nerve conduction study right lower extremity.  Specifically right tibial and peroneal motor and sural sensory conduction studies were within normal limits, there were no acute or chronic denervation changes in selected muscles studied in the right lower extremity.  There is no evidence of right lower extremity peripheral neuropathy, plexopathy or radiculopathy.  Clinical correlation is suggested.        MARYANN Crawley M.D.(No note.)

## 2024-06-26 ENCOUNTER — APPOINTMENT (OUTPATIENT)
Dept: PHYSICAL THERAPY | Facility: REHABILITATION | Age: 39
End: 2024-06-26
Attending: PHYSICAL MEDICINE & REHABILITATION
Payer: COMMERCIAL

## 2024-07-16 ENCOUNTER — HOSPITAL ENCOUNTER (OUTPATIENT)
Facility: MEDICAL CENTER | Age: 39
End: 2024-07-16
Attending: DERMATOLOGY
Payer: COMMERCIAL

## 2024-07-16 ENCOUNTER — APPOINTMENT (RX ONLY)
Dept: URBAN - METROPOLITAN AREA CLINIC 4 | Facility: CLINIC | Age: 39
Setting detail: DERMATOLOGY
End: 2024-07-16

## 2024-07-16 DIAGNOSIS — Z48.817 ENCOUNTER FOR SURGICAL AFTERCARE FOLLOWING SURGERY ON THE SKIN AND SUBCUTANEOUS TISSUE: ICD-10-CM

## 2024-07-16 PROCEDURE — ? PRESCRIPTION

## 2024-07-16 PROCEDURE — ? ORDER TESTS

## 2024-07-16 PROCEDURE — 87070 CULTURE OTHR SPECIMN AEROBIC: CPT

## 2024-07-16 PROCEDURE — ? POST-OP WOUND EVALUATION

## 2024-07-16 PROCEDURE — 87075 CULTR BACTERIA EXCEPT BLOOD: CPT

## 2024-07-16 PROCEDURE — ? MEDICATION COUNSELING

## 2024-07-16 PROCEDURE — 87205 SMEAR GRAM STAIN: CPT

## 2024-07-16 RX ORDER — CEPHALEXIN 500 MG/1
1 CAPSULE ORAL TID
Qty: 30 | Refills: 0 | Status: ERX | COMMUNITY
Start: 2024-07-16

## 2024-07-16 RX ORDER — MUPIROCIN 20 MG/G
1 OINTMENT TOPICAL BID
Qty: 22 | Refills: 2 | Status: ERX | COMMUNITY
Start: 2024-07-16

## 2024-07-16 RX ADMIN — MUPIROCIN 1: 20 OINTMENT TOPICAL at 00:00

## 2024-07-16 RX ADMIN — CEPHALEXIN 1: 500 CAPSULE ORAL at 00:00

## 2024-07-16 ASSESSMENT — LOCATION SIMPLE DESCRIPTION DERM: LOCATION SIMPLE: RIGHT SHOULDER

## 2024-07-16 ASSESSMENT — LOCATION DETAILED DESCRIPTION DERM: LOCATION DETAILED: RIGHT POSTERIOR SHOULDER

## 2024-07-16 ASSESSMENT — LOCATION ZONE DERM: LOCATION ZONE: ARM

## 2024-07-16 NOTE — PROCEDURE: ORDER TESTS
Billing Type: Third-Party Bill
Expected Date Of Service: 06/12/2024
Bill For Surgical Tray: no
Performing Laboratory: [id number]

## 2024-07-16 NOTE — PROCEDURE: POST-OP WOUND EVALUATION
Detail Level: Detailed
Add 41263 Cpt? (Important Note: In 2017 The Use Of 98699 Is Being Tracked By Cms To Determine Future Global Period Reimbursement For Global Periods): no
Wound Evaluated By (Optional): Jhoana
Wound Diameter In Cm(Optional): 0
Wound Discharge?: minimal discharge
Sutures?: intact
Wound Drainage?: purulent drainage
Wound Color?: pink
Wound Dehiscence?: dehiscence

## 2024-07-17 LAB
GRAM STN SPEC: NORMAL
SIGNIFICANT IND 70042: NORMAL
SITE SITE: NORMAL
SOURCE SOURCE: NORMAL

## 2024-07-19 LAB
BACTERIA WND AEROBE CULT: NORMAL
GRAM STN SPEC: NORMAL
SIGNIFICANT IND 70042: NORMAL
SITE SITE: NORMAL
SOURCE SOURCE: NORMAL

## 2024-07-20 LAB
BACTERIA SPEC ANAEROBE CULT: NORMAL
SIGNIFICANT IND 70042: NORMAL
SITE SITE: NORMAL
SOURCE SOURCE: NORMAL

## 2024-07-23 ENCOUNTER — APPOINTMENT (RX ONLY)
Dept: URBAN - METROPOLITAN AREA CLINIC 4 | Facility: CLINIC | Age: 39
Setting detail: DERMATOLOGY
End: 2024-07-23

## 2024-07-23 DIAGNOSIS — Z48.817 ENCOUNTER FOR SURGICAL AFTERCARE FOLLOWING SURGERY ON THE SKIN AND SUBCUTANEOUS TISSUE: ICD-10-CM

## 2024-07-23 PROCEDURE — ? POST-OP WOUND EVALUATION

## 2024-07-23 ASSESSMENT — LOCATION DETAILED DESCRIPTION DERM: LOCATION DETAILED: RIGHT POSTERIOR SHOULDER

## 2024-07-23 ASSESSMENT — LOCATION SIMPLE DESCRIPTION DERM: LOCATION SIMPLE: RIGHT SHOULDER

## 2024-07-23 ASSESSMENT — LOCATION ZONE DERM: LOCATION ZONE: ARM

## 2024-07-23 NOTE — PROCEDURE: POST-OP WOUND EVALUATION
Detail Level: Detailed
Add 78841 Cpt? (Important Note: In 2017 The Use Of 24333 Is Being Tracked By Cms To Determine Future Global Period Reimbursement For Global Periods): no
Quality 357: Surgical Site Infection (Ssi): No surgical site infection
Wound Evaluated By (Optional): Jhoana
Wound Diameter In Cm(Optional): 0
Wound Crusting?: clean
Wound Discharge?: minimal discharge
Sutures?: partially intact
Wound Color?: pink
Wound Dehiscence?: dehiscence
Wound Granulation?: early

## 2024-11-09 ENCOUNTER — APPOINTMENT (OUTPATIENT)
Dept: URGENT CARE | Facility: CLINIC | Age: 39
End: 2024-11-09
Payer: COMMERCIAL

## 2024-11-10 ENCOUNTER — APPOINTMENT (OUTPATIENT)
Dept: RADIOLOGY | Facility: IMAGING CENTER | Age: 39
End: 2024-11-10
Attending: NURSE PRACTITIONER
Payer: COMMERCIAL

## 2024-11-10 ENCOUNTER — OFFICE VISIT (OUTPATIENT)
Dept: URGENT CARE | Facility: CLINIC | Age: 39
End: 2024-11-10
Payer: COMMERCIAL

## 2024-11-10 VITALS
DIASTOLIC BLOOD PRESSURE: 82 MMHG | OXYGEN SATURATION: 96 % | HEART RATE: 86 BPM | TEMPERATURE: 99 F | BODY MASS INDEX: 23.25 KG/M2 | SYSTOLIC BLOOD PRESSURE: 124 MMHG | HEIGHT: 69 IN | WEIGHT: 157 LBS | RESPIRATION RATE: 20 BRPM

## 2024-11-10 DIAGNOSIS — R06.2 WHEEZING: ICD-10-CM

## 2024-11-10 DIAGNOSIS — J40 BRONCHITIS: ICD-10-CM

## 2024-11-10 DIAGNOSIS — R06.02 SOB (SHORTNESS OF BREATH): ICD-10-CM

## 2024-11-10 LAB
FLUAV RNA SPEC QL NAA+PROBE: NEGATIVE
FLUBV RNA SPEC QL NAA+PROBE: NEGATIVE
RSV RNA SPEC QL NAA+PROBE: NEGATIVE
SARS-COV-2 RNA RESP QL NAA+PROBE: NEGATIVE

## 2024-11-10 PROCEDURE — 3074F SYST BP LT 130 MM HG: CPT | Performed by: NURSE PRACTITIONER

## 2024-11-10 PROCEDURE — 71046 X-RAY EXAM CHEST 2 VIEWS: CPT | Mod: TC,FY | Performed by: RADIOLOGY

## 2024-11-10 PROCEDURE — 99214 OFFICE O/P EST MOD 30 MIN: CPT | Performed by: NURSE PRACTITIONER

## 2024-11-10 PROCEDURE — 0241U POCT CEPHEID COV-2, FLU A/B, RSV - PCR: CPT | Performed by: NURSE PRACTITIONER

## 2024-11-10 PROCEDURE — 3079F DIAST BP 80-89 MM HG: CPT | Performed by: NURSE PRACTITIONER

## 2024-11-10 RX ORDER — ALBUTEROL SULFATE 90 UG/1
1-2 INHALANT RESPIRATORY (INHALATION) EVERY 4 HOURS PRN
Qty: 8.5 G | Refills: 0 | Status: SHIPPED | OUTPATIENT
Start: 2024-11-10

## 2024-11-10 ASSESSMENT — FIBROSIS 4 INDEX: FIB4 SCORE: 0.67

## 2024-11-10 ASSESSMENT — ENCOUNTER SYMPTOMS
DIARRHEA: 0
SPUTUM PRODUCTION: 1
FEVER: 1
SHORTNESS OF BREATH: 0
VOMITING: 0
COUGH: 1
WHEEZING: 1
HEMOPTYSIS: 0
SORE THROAT: 1

## 2024-11-10 NOTE — PATIENT INSTRUCTIONS

## 2024-11-10 NOTE — PROGRESS NOTES
"  Subjective:     Mini Howe is a 38 y.o. female who presents for Cough (7 days ), Pharyngitis (7 days ), and Fever (2 days )      Started on Monday, slight fever then. Developed a 101.8 fever Thursday, reporting it \"hit her hard\".     Cough  This is a new problem. The current episode started in the past 7 days. Associated symptoms include a fever, nasal congestion, a sore throat and wheezing. Pertinent negatives include no ear pain, hemoptysis or shortness of breath. There is no history of pneumonia.   Pharyngitis   The pain is at a severity of 2/10. Associated symptoms include congestion and coughing. Pertinent negatives include no diarrhea, ear pain, shortness of breath or vomiting.   Fever   Associated symptoms include congestion, coughing, a sore throat and wheezing. Pertinent negatives include no diarrhea, ear pain or vomiting.       History reviewed. No pertinent past medical history.    Past Surgical History:   Procedure Laterality Date    FOOT SURGERY Left        Social History     Socioeconomic History    Marital status:      Spouse name: Not on file    Number of children: Not on file    Years of education: Not on file    Highest education level: Master's degree (e.g., MA, MS, [place], MEd, MSW, [employer])   Occupational History    Not on file   Tobacco Use    Smoking status: Never    Smokeless tobacco: Never   Vaping Use    Vaping status: Never Used   Substance and Sexual Activity    Alcohol use: Not Currently     Comment: once a month    Drug use: No    Sexual activity: Yes     Partners: Male     Birth control/protection: None   Other Topics Concern     Service No    Blood Transfusions No    Caffeine Concern No    Occupational Exposure No    Hobby Hazards No    Sleep Concern No    Stress Concern No    Weight Concern No    Special Diet Yes    Back Care Yes    Exercise Yes    Bike Helmet No    Seat Belt Yes    Self-Exams No   Social History Narrative    Not on file     Social Drivers of " Health     Financial Resource Strain: Low Risk  (9/4/2023)    Overall Financial Resource Strain (CARDIA)     Difficulty of Paying Living Expenses: Not hard at all   Food Insecurity: No Food Insecurity (9/4/2023)    Hunger Vital Sign     Worried About Running Out of Food in the Last Year: Never true     Ran Out of Food in the Last Year: Never true   Transportation Needs: No Transportation Needs (9/4/2023)    PRAPARE - Transportation     Lack of Transportation (Medical): No     Lack of Transportation (Non-Medical): No   Physical Activity: Insufficiently Active (9/4/2023)    Exercise Vital Sign     Days of Exercise per Week: 3 days     Minutes of Exercise per Session: 40 min   Stress: No Stress Concern Present (9/4/2023)    [employer] - Occupational Stress Questionnaire     Feeling of Stress : Not at all   Social Connections: Unknown (9/4/2023)    Social Connection and Isolation Panel [NHANES]     Frequency of Communication with Friends and Family: Patient declined     Frequency of Social Gatherings with Friends and Family: Twice a week     Attends Church Services: Never     Active Member of Clubs or Organizations: No     Attends Club or Organization Meetings: Never     Marital Status:    Intimate Partner Violence: Not on file   Housing Stability: Low Risk  (9/4/2023)    Housing Stability Vital Sign     Unable to Pay for Housing in the Last Year: No     Number of Places Lived in the Last Year: 1     Unstable Housing in the Last Year: No        Family History   Problem Relation Age of Onset    No Known Problems Mother     Cancer Father 64        brain cancer    No Known Problems Brother     Breast Cancer Maternal Aunt 42    Diabetes Maternal Grandmother     Alzheimer's Disease Paternal Grandfather         Allergies   Allergen Reactions    Gluten Meal        Review of Systems   Constitutional:  Positive for fever and malaise/fatigue.   HENT:  Positive for congestion and sore  "throat. Negative for ear pain.    Respiratory:  Positive for cough, sputum production and wheezing. Negative for hemoptysis and shortness of breath.    Gastrointestinal:  Negative for diarrhea and vomiting.   All other systems reviewed and are negative.       Objective:   /82   Pulse 86   Temp 37.2 °C (99 °F) (Temporal)   Resp 20   Ht 1.753 m (5' 9\")   Wt 71.2 kg (157 lb)   SpO2 96%   BMI 23.18 kg/m²     Physical Exam  Vitals reviewed.   Constitutional:       General: She is not in acute distress.     Appearance: She is well-developed.   HENT:      Head: Normocephalic and atraumatic.      Right Ear: External ear normal.      Left Ear: External ear normal.      Nose: Mucosal edema and rhinorrhea present.      Mouth/Throat:      Pharynx: Posterior oropharyngeal erythema present. No oropharyngeal exudate.   Eyes:      Conjunctiva/sclera: Conjunctivae normal.   Cardiovascular:      Rate and Rhythm: Normal rate.   Pulmonary:      Effort: Pulmonary effort is normal. No respiratory distress.      Breath sounds: No stridor. Examination of the left-lower field reveals wheezing. Wheezing present. No rhonchi or rales.      Comments: Scant.  Skin:     General: Skin is warm and dry.      Findings: No rash.   Neurological:      Mental Status: She is alert and oriented to person, place, and time.      GCS: GCS eye subscore is 4. GCS verbal subscore is 5. GCS motor subscore is 6.   Psychiatric:         Speech: Speech normal.         Assessment/Plan:   1. Bronchitis  - POCT CoV-2, Flu A/B, RSV by PCR  - albuterol 108 (90 Base) MCG/ACT Aero Soln inhalation aerosol; Inhale 1-2 Puffs every four hours as needed for Shortness of Breath.  Dispense: 8.5 g; Refill: 0    2. SOB (shortness of breath)  - DX-CHEST-2 VIEWS; Future  DX-CHEST-2 VIEWS    Result Date: 11/10/2024  11/10/2024 11:02 AM HISTORY/REASON FOR EXAM:  Shortness of Breath. TECHNIQUE/EXAM DESCRIPTION AND NUMBER OF VIEWS: Two views of the chest. COMPARISON:  None. " FINDINGS: The heart is normal in size. No pulmonary infiltrates or consolidations are noted. No pleural effusions are appreciated.     No active disease.    Symptomatic care.  -Oral hydration and rest.   -Cough control: nonpharmacologic options for cough relief such as throat lozenges, hot tea, honey.  -Over the counter expectorant as directed; Guaifenesin (Mucinex).  -Tylenol or ibuprofen for pain and fever as directed.   -Warm salt water gargles.  -OTC Throat lozenges or spray (Cepacol).  -Saline nasal spray.     Seek emergency medical care immediately for: Trouble breathing, persistent pain or pressure in the chest, confusion, inability to wake or stay awake, bluish lips or face, persistent tachycardia (fast heart rate), prolonged dizziness, persistent high grade fevers. Follow up for prolonged cough, persistent wheezing, persistent throat pain, difficulty swallowing, persistent fevers, leg swelling, or any other concerns. Follow up with your Primary Care Provider.     -Stable Vitals. Imaging ordered with SOB and progressive fever. Mild wheeze noted on exam. No pneumonia noted. Discussed viral etiology., symptomatic care, and S&S of PNA with follow up. Wheeze is mild, and patient is breastfeeding. Discussed trialing a SHARMIN, with follow up for persistent or worsening wheezing.     Differential diagnosis, natural history, supportive care, and indications for immediate follow-up discussed.

## 2024-11-26 ENCOUNTER — OFFICE VISIT (OUTPATIENT)
Dept: MEDICAL GROUP | Facility: LAB | Age: 39
End: 2024-11-26
Payer: COMMERCIAL

## 2024-11-26 VITALS
DIASTOLIC BLOOD PRESSURE: 52 MMHG | RESPIRATION RATE: 14 BRPM | OXYGEN SATURATION: 100 % | WEIGHT: 161.6 LBS | HEIGHT: 69 IN | BODY MASS INDEX: 23.93 KG/M2 | TEMPERATURE: 98.1 F | HEART RATE: 66 BPM | SYSTOLIC BLOOD PRESSURE: 116 MMHG

## 2024-11-26 DIAGNOSIS — J40 BRONCHITIS: ICD-10-CM

## 2024-11-26 PROCEDURE — 3078F DIAST BP <80 MM HG: CPT | Performed by: NURSE PRACTITIONER

## 2024-11-26 PROCEDURE — 99213 OFFICE O/P EST LOW 20 MIN: CPT | Performed by: NURSE PRACTITIONER

## 2024-11-26 PROCEDURE — 3074F SYST BP LT 130 MM HG: CPT | Performed by: NURSE PRACTITIONER

## 2024-11-26 RX ORDER — AZITHROMYCIN 250 MG/1
TABLET, FILM COATED ORAL
Qty: 6 TABLET | Refills: 0 | Status: SHIPPED | OUTPATIENT
Start: 2024-11-26 | End: 2024-12-01

## 2024-11-26 ASSESSMENT — FIBROSIS 4 INDEX: FIB4 SCORE: 0.69

## 2024-11-26 NOTE — PROGRESS NOTES
"Chief Complaint   Patient presents with    Cough     Congestion x 3 weeks      Verbal consent was acquired by the patient to use [employer] ambient listening note generation during this visit Yes      HPI:  History of Present Illness  The patient presents for evaluation of a persistent cough.    She reports that her symptoms began approximately 3 weeks ago, following an illness in her 18-month-old daughter. Both she and her daughter experienced fevers, with the patient also suffering from chills for about a week. She sought medical attention at an urgent care facility a few weeks prior, where a viral etiology was suspected, and no treatment was initiated. Her primary complaint is a persistent, lingering cough, accompanied by chest congestion that intensifies in the evening. She reports no current sore throat or ear pain, although she did experience a sore throat during the first week of her illness, which has since resolved. She continues to experience hoarseness. She is not experiencing shortness of breath but does report lightheadedness during coughing fits. She reports no wheezing. She was previously prescribed an inhaler, which she found ineffective and discontinued its use. She is currently breastfeeding her 1.5-year-old daughter and is in the process of weaning. She expresses concern about the potential impact of azithromycin on her breastfeeding infant.    ROS:  As documented in history of present illness above    Exam:   /52 (BP Location: Right arm, Patient Position: Sitting, BP Cuff Size: Adult)   Pulse 66   Temp 36.7 °C (98.1 °F)   Resp 14   Ht 1.753 m (5' 9\")   Wt 73.3 kg (161 lb 9.6 oz)   SpO2 100%   Breastfeeding Yes   BMI 23.86 kg/m²     Constitutional: Alert, no distress, plus 3 vital signs  Skin:  Warm, dry, no rashes invisible areas  Eye: Equal, round and reactive, conjunctiva clear  ENMT: Bilateral tympanic membranes are retracted but translucent without erythema or perforation. " Oropharynx is slightly injected without exudate. No tonsillar enlargement.    Neck: No lymphadenopathy  Respiratory: Unlabored respiration, lungs clear to auscultation, no wheezes, no rhonchi  Cardiovascular: Normal rate and rhythm  Psych: Alert, pleasant, well-groomed, normal affect    Assessment / Plan:  Assessment & Plan  1. Bronchitis  The patient has been experiencing a persistent cough for the past 3 weeks, initially accompanied by fever and chills. The cough is primarily in the chest with some congestion, worsening in the evening. There is no current shortness of breath, wheezing, or ear pain, though she experienced a sore throat and hoarseness initially. She was previously prescribed an inhaler, which was not effective. Given the duration and symptoms, a bacterial infection is suspected. A prescription for azithromycin (Z-Jordan) has been issued, with instructions to take 2 pills on the first day, followed by 1 pill daily for the subsequent 4 days. Potential gastrointestinal side effects have been discussed, and she has been advised to monitor her breastfeeding infant for any adverse reactions. She has been informed that she is unlikely to be contagious at this stage.

## 2024-12-02 DIAGNOSIS — J40 BRONCHITIS: ICD-10-CM

## 2024-12-02 RX ORDER — AZITHROMYCIN 250 MG/1
TABLET, FILM COATED ORAL
Qty: 6 TABLET | Refills: 0 | Status: SHIPPED | OUTPATIENT
Start: 2024-12-02 | End: 2024-12-07

## 2025-01-23 ENCOUNTER — APPOINTMENT (OUTPATIENT)
Dept: URBAN - METROPOLITAN AREA CLINIC 20 | Facility: CLINIC | Age: 40
Setting detail: DERMATOLOGY
End: 2025-01-23

## 2025-01-23 DIAGNOSIS — Z41.9 ENCOUNTER FOR PROCEDURE FOR PURPOSES OTHER THAN REMEDYING HEALTH STATE, UNSPECIFIED: ICD-10-CM

## 2025-01-23 PROCEDURE — ? SCITON BBL

## 2025-01-23 ASSESSMENT — LOCATION DETAILED DESCRIPTION DERM
LOCATION DETAILED: SUPERIOR MID FOREHEAD
LOCATION DETAILED: RIGHT INFERIOR CENTRAL MALAR CHEEK
LOCATION DETAILED: LEFT CHIN
LOCATION DETAILED: NASAL DORSUM
LOCATION DETAILED: LEFT INFERIOR CENTRAL MALAR CHEEK

## 2025-01-23 ASSESSMENT — LOCATION SIMPLE DESCRIPTION DERM
LOCATION SIMPLE: CHIN
LOCATION SIMPLE: SUPERIOR FOREHEAD
LOCATION SIMPLE: NOSE
LOCATION SIMPLE: RIGHT CHEEK
LOCATION SIMPLE: LEFT CHEEK

## 2025-01-23 ASSESSMENT — LOCATION ZONE DERM
LOCATION ZONE: FACE
LOCATION ZONE: NOSE

## 2025-07-27 SDOH — ECONOMIC STABILITY: INCOME INSECURITY: IN THE LAST 12 MONTHS, WAS THERE A TIME WHEN YOU WERE NOT ABLE TO PAY THE MORTGAGE OR RENT ON TIME?: NO

## 2025-07-27 SDOH — ECONOMIC STABILITY: FOOD INSECURITY: WITHIN THE PAST 12 MONTHS, THE FOOD YOU BOUGHT JUST DIDN'T LAST AND YOU DIDN'T HAVE MONEY TO GET MORE.: NEVER TRUE

## 2025-07-27 SDOH — ECONOMIC STABILITY: FOOD INSECURITY: WITHIN THE PAST 12 MONTHS, YOU WORRIED THAT YOUR FOOD WOULD RUN OUT BEFORE YOU GOT MONEY TO BUY MORE.: NEVER TRUE

## 2025-07-27 SDOH — HEALTH STABILITY: PHYSICAL HEALTH: ON AVERAGE, HOW MANY MINUTES DO YOU ENGAGE IN EXERCISE AT THIS LEVEL?: 60 MIN

## 2025-07-27 SDOH — HEALTH STABILITY: PHYSICAL HEALTH: ON AVERAGE, HOW MANY DAYS PER WEEK DO YOU ENGAGE IN MODERATE TO STRENUOUS EXERCISE (LIKE A BRISK WALK)?: 3 DAYS

## 2025-07-27 SDOH — ECONOMIC STABILITY: INCOME INSECURITY: HOW HARD IS IT FOR YOU TO PAY FOR THE VERY BASICS LIKE FOOD, HOUSING, MEDICAL CARE, AND HEATING?: NOT HARD AT ALL

## 2025-07-27 ASSESSMENT — SOCIAL DETERMINANTS OF HEALTH (SDOH)
WITHIN THE PAST 12 MONTHS, YOU WORRIED THAT YOUR FOOD WOULD RUN OUT BEFORE YOU GOT THE MONEY TO BUY MORE: NEVER TRUE
HOW OFTEN DO YOU ATTENT MEETINGS OF THE CLUB OR ORGANIZATION YOU BELONG TO?: NEVER
HOW MANY DRINKS CONTAINING ALCOHOL DO YOU HAVE ON A TYPICAL DAY WHEN YOU ARE DRINKING: 1 OR 2
DO YOU BELONG TO ANY CLUBS OR ORGANIZATIONS SUCH AS CHURCH GROUPS UNIONS, FRATERNAL OR ATHLETIC GROUPS, OR SCHOOL GROUPS?: NO
IN THE PAST 12 MONTHS, HAS THE ELECTRIC, GAS, OIL, OR WATER COMPANY THREATENED TO SHUT OFF SERVICE IN YOUR HOME?: NO
HOW OFTEN DO YOU ATTEND CHURCH OR RELIGIOUS SERVICES?: NEVER
HOW OFTEN DO YOU ATTENT MEETINGS OF THE CLUB OR ORGANIZATION YOU BELONG TO?: NEVER
HOW OFTEN DO YOU HAVE SIX OR MORE DRINKS ON ONE OCCASION: NEVER
DO YOU BELONG TO ANY CLUBS OR ORGANIZATIONS SUCH AS CHURCH GROUPS UNIONS, FRATERNAL OR ATHLETIC GROUPS, OR SCHOOL GROUPS?: NO
HOW OFTEN DO YOU GET TOGETHER WITH FRIENDS OR RELATIVES?: MORE THAN THREE TIMES A WEEK
HOW HARD IS IT FOR YOU TO PAY FOR THE VERY BASICS LIKE FOOD, HOUSING, MEDICAL CARE, AND HEATING?: NOT HARD AT ALL
HOW OFTEN DO YOU GET TOGETHER WITH FRIENDS OR RELATIVES?: MORE THAN THREE TIMES A WEEK
IN A TYPICAL WEEK, HOW MANY TIMES DO YOU TALK ON THE PHONE WITH FAMILY, FRIENDS, OR NEIGHBORS?: MORE THAN THREE TIMES A WEEK
IN A TYPICAL WEEK, HOW MANY TIMES DO YOU TALK ON THE PHONE WITH FAMILY, FRIENDS, OR NEIGHBORS?: MORE THAN THREE TIMES A WEEK
HOW OFTEN DO YOU HAVE A DRINK CONTAINING ALCOHOL: MONTHLY OR LESS
HOW OFTEN DO YOU ATTEND CHURCH OR RELIGIOUS SERVICES?: NEVER

## 2025-07-27 ASSESSMENT — LIFESTYLE VARIABLES
AUDIT-C TOTAL SCORE: 1
SKIP TO QUESTIONS 9-10: 1
HOW OFTEN DO YOU HAVE A DRINK CONTAINING ALCOHOL: MONTHLY OR LESS
HOW MANY STANDARD DRINKS CONTAINING ALCOHOL DO YOU HAVE ON A TYPICAL DAY: 1 OR 2
HOW OFTEN DO YOU HAVE SIX OR MORE DRINKS ON ONE OCCASION: NEVER

## 2025-07-28 ENCOUNTER — APPOINTMENT (OUTPATIENT)
Dept: MEDICAL GROUP | Facility: LAB | Age: 40
End: 2025-07-28
Payer: COMMERCIAL